# Patient Record
Sex: MALE | Race: OTHER | Employment: OTHER | URBAN - METROPOLITAN AREA
[De-identification: names, ages, dates, MRNs, and addresses within clinical notes are randomized per-mention and may not be internally consistent; named-entity substitution may affect disease eponyms.]

---

## 2017-01-04 ENCOUNTER — TELEPHONE (OUTPATIENT)
Dept: HEMATOLOGY/ONCOLOGY | Facility: HOSPITAL | Age: 67
End: 2017-01-04

## 2017-01-04 NOTE — TELEPHONE ENCOUNTER
Called patient on both home and cell numbers, no answer.  LM reminding him to get labs done prior to tomorrow's follow up with Dr Jose Alejandro Salgado

## 2017-01-05 ENCOUNTER — APPOINTMENT (OUTPATIENT)
Dept: HEMATOLOGY/ONCOLOGY | Facility: HOSPITAL | Age: 67
End: 2017-01-05
Attending: INTERNAL MEDICINE
Payer: MEDICARE

## 2017-01-05 ENCOUNTER — OFFICE VISIT (OUTPATIENT)
Dept: HEMATOLOGY/ONCOLOGY | Facility: HOSPITAL | Age: 67
End: 2017-01-05
Attending: INTERNAL MEDICINE

## 2017-01-05 VITALS
TEMPERATURE: 98 F | WEIGHT: 160 LBS | HEIGHT: 67 IN | DIASTOLIC BLOOD PRESSURE: 80 MMHG | RESPIRATION RATE: 16 BRPM | SYSTOLIC BLOOD PRESSURE: 117 MMHG | HEART RATE: 85 BPM | BODY MASS INDEX: 25.11 KG/M2

## 2017-01-05 DIAGNOSIS — F10.10 ALCOHOL ABUSE: ICD-10-CM

## 2017-01-05 DIAGNOSIS — D69.6 THROMBOCYTOPENIA (HCC): Primary | ICD-10-CM

## 2017-01-05 PROCEDURE — G0463 HOSPITAL OUTPT CLINIC VISIT: HCPCS | Performed by: INTERNAL MEDICINE

## 2017-01-05 PROCEDURE — 99214 OFFICE O/P EST MOD 30 MIN: CPT | Performed by: INTERNAL MEDICINE

## 2017-01-05 NOTE — PROGRESS NOTES
Cancer Center Progress Note    Patient Name: Xochitl Guzman   YOB: 1950   Medical Record Number: [id number]   Attending Physician: Mason Peña M.D. Chief Complaint:   Thrombocytopenia, hx of etoh abuse    History of Present Illness:  6 tobacco: Never Used    Alcohol Use: Yes  0.0 oz/week    0 Standard drinks or equivalent per week         Comment: heavy socially     Drug Use: No    Sexual Activity: Not on file   Not on file  Other Topics Concern    Caffeine Concern Yes    Comment: 1 can 09/24/2016   ALKPHOS 67 09/24/2016   BILT 0.7 09/24/2016   TP 6.7 09/24/2016   ALB 3.9 09/24/2016   GLOBULIN 2.8 09/24/2016   AGRATIO 1.4 09/24/2016    09/24/2016   K 4.2 09/24/2016    09/24/2016   CO2 27 09/24/2016       Radiology:  Ultrasound

## 2017-01-11 NOTE — TELEPHONE ENCOUNTER
AZALIA please call the patient and schedule an appointment to establish care and than route back to us. Thanks.     Diabetes Medications  Protocol Criteria:  · Appointment scheduled in the past 6 months or the next 3 months  · A1C < 7.5 in the past 6 months

## 2017-01-17 NOTE — TELEPHONE ENCOUNTER
Patient stated will choose Dr. Benton Ashley has his new PCP. Medication refilled under Dr. Carmelo Bowers per protocol.

## 2017-03-27 NOTE — TELEPHONE ENCOUNTER
Pharmacy calling to check status on refill, Will have pt call in to establish with new provider, Please advise.

## 2017-03-28 RX ORDER — ATORVASTATIN CALCIUM 20 MG/1
TABLET, FILM COATED ORAL
Qty: 30 TABLET | Refills: 0 | Status: CANCELLED | OUTPATIENT
Start: 2017-03-28

## 2017-03-31 ENCOUNTER — OFFICE VISIT (OUTPATIENT)
Dept: INTERNAL MEDICINE CLINIC | Facility: CLINIC | Age: 67
End: 2017-03-31

## 2017-03-31 VITALS — TEMPERATURE: 98 F | WEIGHT: 163.38 LBS | BODY MASS INDEX: 26 KG/M2

## 2017-03-31 DIAGNOSIS — E78.2 MIXED HYPERLIPIDEMIA: Primary | ICD-10-CM

## 2017-03-31 DIAGNOSIS — E11.9 CONTROLLED TYPE 2 DIABETES MELLITUS WITHOUT COMPLICATION, WITHOUT LONG-TERM CURRENT USE OF INSULIN (HCC): ICD-10-CM

## 2017-03-31 DIAGNOSIS — F41.9 ANXIETY: ICD-10-CM

## 2017-03-31 PROCEDURE — G0463 HOSPITAL OUTPT CLINIC VISIT: HCPCS | Performed by: INTERNAL MEDICINE

## 2017-03-31 PROCEDURE — 99214 OFFICE O/P EST MOD 30 MIN: CPT | Performed by: INTERNAL MEDICINE

## 2017-03-31 RX ORDER — LORAZEPAM 2 MG/1
TABLET ORAL 2 TIMES DAILY
COMMUNITY
End: 2017-03-31

## 2017-03-31 RX ORDER — LORAZEPAM 2 MG/1
2 TABLET ORAL 2 TIMES DAILY PRN
Qty: 60 TABLET | Refills: 0 | Status: SHIPPED | OUTPATIENT
Start: 2017-03-31 | End: 2018-09-02 | Stop reason: DRUGHIGH

## 2017-03-31 RX ORDER — ATORVASTATIN CALCIUM 20 MG/1
20 TABLET, FILM COATED ORAL NIGHTLY
Qty: 90 TABLET | Refills: 1 | Status: SHIPPED | OUTPATIENT
Start: 2017-03-31 | End: 2018-09-02 | Stop reason: ALTCHOICE

## 2017-03-31 NOTE — PROGRESS NOTES
HPI:    Patient ID: Samuel Venegas is a 79year old male. Diabetes  He presents for his follow-up diabetic visit. He has type 2 diabetes mellitus. His disease course has been stable. There are no hypoglycemic associated symptoms.  Pertinent negatives f Surgical History    WRIST FRACTURE SURGERY Left 2003    AMPUTATION FINGER/THUMB Right 1965    Comment traumatic, x 3      History reviewed. No pertinent family history.      Smoking Status: Current Some Day Smoker         Packs/Day: 0.50  Years: 48        T 09/24/2016   A1C 6.0 06/09/2016   A1C 5.8 12/24/2015     Cholesterol:     Lab Results  Component Value Date   CHOLEST 76* 09/24/2016   CHOLEST 120 06/09/2016   CHOLEST 145 12/24/2015     Lab Results  Component Value Date   HDL 20 09/24/2016   HDL 26 06/09/ LORazepam 2 MG Oral Tab  0     Sig: Take by mouth 2 (two) times daily.          Imaging & Referrals:  None       ID#[id number]    By signing my name below, Aleks Dalton,  attest that this documentation has been prepared under the direction and in the presence of

## 2017-04-05 ENCOUNTER — TELEPHONE (OUTPATIENT)
Dept: HEMATOLOGY/ONCOLOGY | Facility: HOSPITAL | Age: 67
End: 2017-04-05

## 2017-04-05 NOTE — TELEPHONE ENCOUNTER
NAVDEEP for patient reminding him to have labs done prior to tomorrow's follow up visit with Dr Lesli Gómez

## 2017-04-06 ENCOUNTER — TELEPHONE (OUTPATIENT)
Dept: HEMATOLOGY/ONCOLOGY | Facility: HOSPITAL | Age: 67
End: 2017-04-06

## 2017-04-06 ENCOUNTER — APPOINTMENT (OUTPATIENT)
Dept: HEMATOLOGY/ONCOLOGY | Facility: HOSPITAL | Age: 67
End: 2017-04-06
Attending: INTERNAL MEDICINE
Payer: MEDICARE

## 2017-04-06 NOTE — TELEPHONE ENCOUNTER
PT NO SHOW SPOKE TO LUZ AND SHE SAID PT NEEDS TO RESCHEDULE. SHE WILL SEND MSG TO CALL CENTER.  [facility]

## 2017-04-27 NOTE — TELEPHONE ENCOUNTER
I have never seen this patient before. However this patient was evaluated and treated by Dr. Katelynn Nguyen. I sent the requested refill but the patient needs to make an appointment with me. Call the patient and relay the information.  Thanks

## 2018-09-02 ENCOUNTER — APPOINTMENT (OUTPATIENT)
Dept: GENERAL RADIOLOGY | Facility: HOSPITAL | Age: 68
DRG: 188 | End: 2018-09-02
Attending: EMERGENCY MEDICINE
Payer: MEDICARE

## 2018-09-02 ENCOUNTER — APPOINTMENT (OUTPATIENT)
Dept: CT IMAGING | Facility: HOSPITAL | Age: 68
DRG: 188 | End: 2018-09-02
Attending: EMERGENCY MEDICINE
Payer: MEDICARE

## 2018-09-02 ENCOUNTER — HOSPITAL ENCOUNTER (INPATIENT)
Facility: HOSPITAL | Age: 68
LOS: 1 days | Discharge: HOME | DRG: 188 | End: 2018-09-03
Attending: EMERGENCY MEDICINE | Admitting: HOSPITALIST
Payer: MEDICARE

## 2018-09-02 ENCOUNTER — APPOINTMENT (OUTPATIENT)
Dept: ULTRASOUND IMAGING | Facility: HOSPITAL | Age: 68
DRG: 188 | End: 2018-09-02
Attending: INTERNAL MEDICINE
Payer: MEDICARE

## 2018-09-02 ENCOUNTER — APPOINTMENT (OUTPATIENT)
Dept: GENERAL RADIOLOGY | Facility: HOSPITAL | Age: 68
DRG: 188 | End: 2018-09-02
Attending: INTERNAL MEDICINE
Payer: MEDICARE

## 2018-09-02 DIAGNOSIS — J90 PLEURAL EFFUSION: Primary | ICD-10-CM

## 2018-09-02 LAB
AMYLASE PLEURAL FLUID: 41 U/L
ANION GAP SERPL CALC-SCNC: 12 MMOL/L (ref 0–18)
BASOPHILS # BLD: 0.1 K/UL (ref 0–0.2)
BASOPHILS NFR BLD: 2 %
BASOPHILS NFR FLD: 0 %
BNP SERPL-MCNC: 237 PG/ML (ref 0–100)
BUN SERPL-MCNC: 8 MG/DL (ref 8–20)
BUN/CREAT SERPL: 8.8 (ref 10–20)
CALCIUM SERPL-MCNC: 8.5 MG/DL (ref 8.5–10.5)
CHLORIDE SERPL-SCNC: 101 MMOL/L (ref 95–110)
CO2 SERPL-SCNC: 21 MMOL/L (ref 22–32)
CREAT SERPL-MCNC: 0.91 MG/DL (ref 0.5–1.5)
EOSINOPHIL # BLD: 0.1 K/UL (ref 0–0.7)
EOSINOPHIL NFR BLD: 2 %
EOSINOPHIL NFR FLD: 1 %
ERYTHROCYTE [DISTWIDTH] IN BLOOD BY AUTOMATED COUNT: 14.9 % (ref 11–15)
GLUCOSE BLDC GLUCOMTR-MCNC: 106 MG/DL (ref 70–99)
GLUCOSE BLDC GLUCOMTR-MCNC: 141 MG/DL (ref 70–99)
GLUCOSE BLDC GLUCOMTR-MCNC: 91 MG/DL (ref 70–99)
GLUCOSE PLR-MCNC: 99 MG/DL
GLUCOSE SERPL-MCNC: 198 MG/DL (ref 70–99)
HCT VFR BLD AUTO: 35.7 % (ref 41–52)
HGB BLD-MCNC: 12 G/DL (ref 13.5–17.5)
LDH PLEURAL FLUID: 202 U/L
LYMPHOCYTES # BLD: 0.8 K/UL (ref 1–4)
LYMPHOCYTES NFR BLD: 17 %
LYMPHOCYTES NFR FLD: 84 %
MCH RBC QN AUTO: 29.7 PG (ref 27–32)
MCHC RBC AUTO-ENTMCNC: 33.6 G/DL (ref 32–37)
MCV RBC AUTO: 88.5 FL (ref 80–100)
MONOCYTES # BLD: 0.5 K/UL (ref 0–1)
MONOCYTES NFR BLD: 10 %
MONOCYTES NFR FLD: 12 %
NEUTROPHILS # BLD AUTO: 3.5 K/UL (ref 1.8–7.7)
NEUTROPHILS NFR BLD: 71 %
NEUTROPHILS NFR FLD: 3 %
OSMOLALITY UR CALC.SUM OF ELEC: 282 MOSM/KG (ref 275–295)
PLATELET # BLD AUTO: 208 K/UL (ref 140–400)
PMV BLD AUTO: 8.9 FL (ref 7.4–10.3)
POTASSIUM SERPL-SCNC: 3.1 MMOL/L (ref 3.3–5.1)
PROT PLR-MCNC: 4.8 G/DL
RBC # BLD AUTO: 4.03 M/UL (ref 4.5–5.9)
RBC # FLD: ABNORMAL /CUMM (ref ?–1)
SODIUM SERPL-SCNC: 134 MMOL/L (ref 136–144)
TRIGL PLR-MCNC: 25 MG/DL
TROPONIN I SERPL-MCNC: 0.01 NG/ML (ref ?–0.03)
WBC # BLD AUTO: 4.9 K/UL (ref 4–11)
WBC # FLD: 269 /CUMM (ref ?–1)

## 2018-09-02 PROCEDURE — 32555 ASPIRATE PLEURA W/ IMAGING: CPT | Performed by: INTERNAL MEDICINE

## 2018-09-02 PROCEDURE — 99223 1ST HOSP IP/OBS HIGH 75: CPT | Performed by: HOSPITALIST

## 2018-09-02 PROCEDURE — 0W993ZZ DRAINAGE OF RIGHT PLEURAL CAVITY, PERCUTANEOUS APPROACH: ICD-10-PCS | Performed by: INTERNAL MEDICINE

## 2018-09-02 PROCEDURE — 71260 CT THORAX DX C+: CPT | Performed by: EMERGENCY MEDICINE

## 2018-09-02 PROCEDURE — 71046 X-RAY EXAM CHEST 2 VIEWS: CPT | Performed by: EMERGENCY MEDICINE

## 2018-09-02 PROCEDURE — 99223 1ST HOSP IP/OBS HIGH 75: CPT | Performed by: INTERNAL MEDICINE

## 2018-09-02 PROCEDURE — 71045 X-RAY EXAM CHEST 1 VIEW: CPT | Performed by: INTERNAL MEDICINE

## 2018-09-02 RX ORDER — POTASSIUM CHLORIDE 20 MEQ/1
40 TABLET, EXTENDED RELEASE ORAL ONCE
Status: COMPLETED | OUTPATIENT
Start: 2018-09-02 | End: 2018-09-02

## 2018-09-02 RX ORDER — LORAZEPAM 2 MG/1
1 TABLET ORAL
COMMUNITY
End: 2018-10-04

## 2018-09-02 RX ORDER — SODIUM PHOSPHATE, DIBASIC AND SODIUM PHOSPHATE, MONOBASIC 7; 19 G/133ML; G/133ML
1 ENEMA RECTAL ONCE AS NEEDED
Status: DISCONTINUED | OUTPATIENT
Start: 2018-09-02 | End: 2018-09-03

## 2018-09-02 RX ORDER — DOCUSATE SODIUM 100 MG/1
100 CAPSULE, LIQUID FILLED ORAL 2 TIMES DAILY
Status: DISCONTINUED | OUTPATIENT
Start: 2018-09-02 | End: 2018-09-03

## 2018-09-02 RX ORDER — BISACODYL 10 MG
10 SUPPOSITORY, RECTAL RECTAL
Status: DISCONTINUED | OUTPATIENT
Start: 2018-09-02 | End: 2018-09-03

## 2018-09-02 RX ORDER — DEXTROSE MONOHYDRATE 25 G/50ML
50 INJECTION, SOLUTION INTRAVENOUS AS NEEDED
Status: DISCONTINUED | OUTPATIENT
Start: 2018-09-02 | End: 2018-09-03

## 2018-09-02 RX ORDER — LORAZEPAM 2 MG/1
2 TABLET ORAL NIGHTLY PRN
COMMUNITY
End: 2018-09-10

## 2018-09-02 RX ORDER — POLYETHYLENE GLYCOL 3350 17 G/17G
17 POWDER, FOR SOLUTION ORAL DAILY PRN
Status: DISCONTINUED | OUTPATIENT
Start: 2018-09-02 | End: 2018-09-03

## 2018-09-02 RX ORDER — ACETAMINOPHEN 325 MG/1
650 TABLET ORAL EVERY 6 HOURS PRN
Status: DISCONTINUED | OUTPATIENT
Start: 2018-09-02 | End: 2018-09-03

## 2018-09-02 RX ORDER — HYDROCODONE BITARTRATE AND ACETAMINOPHEN 5; 325 MG/1; MG/1
1 TABLET ORAL EVERY 6 HOURS PRN
Status: DISCONTINUED | OUTPATIENT
Start: 2018-09-02 | End: 2018-09-03

## 2018-09-02 RX ORDER — SODIUM CHLORIDE 0.9 % (FLUSH) 0.9 %
3 SYRINGE (ML) INJECTION AS NEEDED
Status: DISCONTINUED | OUTPATIENT
Start: 2018-09-02 | End: 2018-09-03

## 2018-09-02 RX ORDER — HEPARIN SODIUM 5000 [USP'U]/ML
5000 INJECTION, SOLUTION INTRAVENOUS; SUBCUTANEOUS EVERY 8 HOURS SCHEDULED
Status: DISCONTINUED | OUTPATIENT
Start: 2018-09-02 | End: 2018-09-03

## 2018-09-02 RX ORDER — LORAZEPAM 1 MG/1
1 TABLET ORAL EVERY 6 HOURS PRN
Status: DISCONTINUED | OUTPATIENT
Start: 2018-09-02 | End: 2018-09-03

## 2018-09-02 NOTE — H&P
[facility] Patient Status:  Inpatient    1950 MRN [id number]   Location [facility] 4W/SW/SE Attending Ozzy Emery MD   Hosp Day # 0 PCP Billy Escamilla MD     Date:  2018 nightly as needed for Anxiety. ASPIRIN OR Take by mouth daily. Pt is unsure of strength of aspirin tabs he is taking. He reports he got the pills in [place], there is no strength printed  on package   METFORMIN  MG Oral Tab TAKE 1 TABLET (500 MG) BY right pleural effusion. Associated right basilar hazy airspace disease, which may reflect compressive atelectasis or superimposed pneumonia. Consider correlation with fluid sampling.      Dictated by (CST): Luh Traylor MD on 9/02/2018 at 11:24     Appro No significant changes have occurred Electronically signed on 09/02/2018 at 11:14 by Jose Juan Ashley MD      Assessment/Plan:     Large right-sided pleural-based mass  Suspicious for primary pleural-based neoplasm  Suspect stage IV lung cancer  Large partially

## 2018-09-02 NOTE — ED INITIAL ASSESSMENT (HPI)
Pt is c/o right sided chest pain for the past 2 weeks with cough. Pt recently arrived from [facility] yesterday.

## 2018-09-02 NOTE — CONSULTS
[facility]OSP - [place]    Report of Consultation    Beto Dominguez Patient Status:  Emergency    1950 MRN [id number]   Location [address] Attending Eduardo Johnson MD   Hosp Day # 0 PCP Edyta Hebert MD day  Alcohol use: Yes              Comment: socially, on occasion         Current Medications:    No current facility-administered medications for this encounter.      (Not in a hospital admission)    Allergies  No Known Allergies    Review of Systems:    P This is suspicious for primary pleural-based neoplasm. The mass demonstrates extension into the right anterior chest wall through multiple intercostal spaces.  There is also an adjacent lytic lesion involving the right anterior fifth costochondral junction,

## 2018-09-02 NOTE — ED NOTES
Care assumed from triage [facility] to room stand pivot to stretcher Primarily Cambodian speaking presents in care of bilingual family with several week hx of R lateral chest discomfort that exacerbates with deep breathing reports + 10 pd unintentional weight loss R

## 2018-09-02 NOTE — H&P (VIEW-ONLY)
[facility] HOSP - [facility]    Report of Consultation    Xocihtl Guzman Patient Status:  Emergency    1950 MRN [id number]   Location [address] Attending Bobo Najera MD   Hosp Day # 0 PCP Arcadio Joaquin MD day  Alcohol use: Yes              Comment: socially, on occasion         Current Medications:    No current facility-administered medications for this encounter.      (Not in a hospital admission)    Allergies  No Known Allergies    Review of Systems:    P This is suspicious for primary pleural-based neoplasm. The mass demonstrates extension into the right anterior chest wall through multiple intercostal spaces.  There is also an adjacent lytic lesion involving the right anterior fifth costochondral junction,

## 2018-09-02 NOTE — ED PROVIDER NOTES
Patient Seen in: [facility] AND [facility] Emergency Department    History   Patient presents with:  Dyspnea ROBYN SOB (respiratory)  Chest Pain Angina (cardiovascular)    Stated Complaint: SOB/Chest Pain     HPI    27-year-old male who does smoke but lives here b normal. Pupils are equal, round, and reactive to light. Neck: Normal range of motion. Neck supple. No JVD or lymphadenopathy. Cardiovascular: Normal rate, regular rhythm and intact distal pulses.     Pulmonary/Chest: Effort normal. No respiratory distre RAINBOW DRAW BLUE   RAINBOW DRAW LAVENDER   RAINBOW DRAW DARK GREEN   RAINBOW DRAW LIGHT GREEN   RAINBOW DRAW GOLD   RAINBOW DRAW LAVENDER TALL (BNP)     EKG    Rate, intervals and axes as noted on EKG Report.   Rate: 102  Rhythm: Sinus Rhythm  Reading: N hemodynamically stable. Disposition and Plan     Clinical Impression:  Pleural effusion  (primary encounter diagnosis)    Disposition:  Admit  9/2/2018  1:05 pm    Follow-up:  No follow-up provider specified.   We recommend that you schedule follow

## 2018-09-03 VITALS
WEIGHT: 160 LBS | OXYGEN SATURATION: 96 % | BODY MASS INDEX: 25.71 KG/M2 | DIASTOLIC BLOOD PRESSURE: 71 MMHG | TEMPERATURE: 98 F | HEIGHT: 66 IN | SYSTOLIC BLOOD PRESSURE: 103 MMHG | RESPIRATION RATE: 20 BRPM | HEART RATE: 92 BPM

## 2018-09-03 LAB
ANION GAP SERPL CALC-SCNC: 7 MMOL/L (ref 0–18)
BASOPHILS # BLD: 0.1 K/UL (ref 0–0.2)
BASOPHILS NFR BLD: 1 %
BUN SERPL-MCNC: 8 MG/DL (ref 8–20)
BUN/CREAT SERPL: 10.4 (ref 10–20)
CALCIUM SERPL-MCNC: 8.5 MG/DL (ref 8.5–10.5)
CHLORIDE SERPL-SCNC: 104 MMOL/L (ref 95–110)
CO2 SERPL-SCNC: 27 MMOL/L (ref 22–32)
CREAT SERPL-MCNC: 0.77 MG/DL (ref 0.5–1.5)
EOSINOPHIL # BLD: 0.1 K/UL (ref 0–0.7)
EOSINOPHIL NFR BLD: 2 %
ERYTHROCYTE [DISTWIDTH] IN BLOOD BY AUTOMATED COUNT: 14.8 % (ref 11–15)
GLUCOSE BLDC GLUCOMTR-MCNC: 108 MG/DL (ref 70–99)
GLUCOSE BLDC GLUCOMTR-MCNC: 114 MG/DL (ref 70–99)
GLUCOSE SERPL-MCNC: 123 MG/DL (ref 70–99)
HBA1C MFR BLD: 6.3 % (ref 4–6)
HCT VFR BLD AUTO: 33.9 % (ref 41–52)
HGB BLD-MCNC: 11.4 G/DL (ref 13.5–17.5)
LYMPHOCYTES # BLD: 1 K/UL (ref 1–4)
LYMPHOCYTES NFR BLD: 20 %
MAGNESIUM SERPL-MCNC: 1.6 MG/DL (ref 1.8–2.5)
MCH RBC QN AUTO: 29.8 PG (ref 27–32)
MCHC RBC AUTO-ENTMCNC: 33.7 G/DL (ref 32–37)
MCV RBC AUTO: 88.3 FL (ref 80–100)
MONOCYTES # BLD: 0.5 K/UL (ref 0–1)
MONOCYTES NFR BLD: 11 %
NEUTROPHILS # BLD AUTO: 3.2 K/UL (ref 1.8–7.7)
NEUTROPHILS NFR BLD: 65 %
OSMOLALITY UR CALC.SUM OF ELEC: 286 MOSM/KG (ref 275–295)
PLATELET # BLD AUTO: 191 K/UL (ref 140–400)
PMV BLD AUTO: 9 FL (ref 7.4–10.3)
POTASSIUM SERPL-SCNC: 3.3 MMOL/L (ref 3.3–5.1)
RBC # BLD AUTO: 3.84 M/UL (ref 4.5–5.9)
SODIUM SERPL-SCNC: 138 MMOL/L (ref 136–144)
WBC # BLD AUTO: 4.8 K/UL (ref 4–11)

## 2018-09-03 PROCEDURE — 99232 SBSQ HOSP IP/OBS MODERATE 35: CPT | Performed by: INTERNAL MEDICINE

## 2018-09-03 PROCEDURE — 99239 HOSP IP/OBS DSCHRG MGMT >30: CPT | Performed by: HOSPITALIST

## 2018-09-03 RX ORDER — NICOTINE 21 MG/24HR
1 PATCH, TRANSDERMAL 24 HOURS TRANSDERMAL DAILY
Qty: 30 PATCH | Refills: 0 | Status: SHIPPED | OUTPATIENT
Start: 2018-09-03 | End: 2019-02-26 | Stop reason: ALTCHOICE

## 2018-09-03 RX ORDER — POTASSIUM CHLORIDE 20 MEQ/1
40 TABLET, EXTENDED RELEASE ORAL EVERY 4 HOURS
Status: COMPLETED | OUTPATIENT
Start: 2018-09-03 | End: 2018-09-03

## 2018-09-03 RX ORDER — MAGNESIUM OXIDE 400 MG (241.3 MG MAGNESIUM) TABLET
400 TABLET ONCE
Status: COMPLETED | OUTPATIENT
Start: 2018-09-03 | End: 2018-09-03

## 2018-09-03 RX ORDER — NICOTINE 21 MG/24HR
1 PATCH, TRANSDERMAL 24 HOURS TRANSDERMAL DAILY
Status: DISCONTINUED | OUTPATIENT
Start: 2018-09-03 | End: 2018-09-03

## 2018-09-03 RX ORDER — MAGNESIUM SULFATE HEPTAHYDRATE 40 MG/ML
2 INJECTION, SOLUTION INTRAVENOUS ONCE
Status: DISCONTINUED | OUTPATIENT
Start: 2018-09-03 | End: 2018-09-03

## 2018-09-03 RX ORDER — POTASSIUM CHLORIDE 29.8 MG/ML
40 INJECTION INTRAVENOUS ONCE
Status: DISCONTINUED | OUTPATIENT
Start: 2018-09-03 | End: 2018-09-03

## 2018-09-03 NOTE — PLAN OF CARE
Diabetes/Glucose Control    • Glucose maintained within prescribed range Progressing        PAIN - ADULT    • Verbalizes/displays adequate comfort level or patient's stated pain goal Progressing        Patient Centered Care    • Patient preferences are jolie

## 2018-09-03 NOTE — PROGRESS NOTES
Patient received from ED, alert and oriented X4. Bruneian speaking but daughter at bedside to assisit in translation (refused language line). Patient denies pain. US guided thoracentesis done by Dr Marcos Salguero (900 ml removed) and sent for labs.  No distress note

## 2018-09-03 NOTE — DISCHARGE SUMMARY
[facility]    Discharge Summary    Rosaura Barton Patient Status:  Inpatient    1950 MRN [id number]   Location [facility] 4W/SW/SE Attending Tracey Ma MD   Hosp Day # 1 PCP Saul Ascencio MD     Date of Admissi A1c  -Sliding scale insulin     Small hiatal hernia  -No acute issues     DVT prophylaxis: Heparin      CULTURE:     Hospital Encounter on 09/02/18  1.  BODY FLUID CULT,AEROBIC AND ANAEROBIC     Status: None (Preliminary result)   Collection Time: 09/02/18 reflect low-grade pulmonary edema. 6. Coronary atherosclerosis. 7. Sequelae of remote granulomatous disease. 8. Small hiatal hernia. Findings that can be seen with gastroesophageal reflux disease.      Dictated by (CST): Michelle Gray MD on 9/02/2018 at 1 [address]    In 1 week      Elex Pallas, Donnie Blend, MD  [facility] [address]  [phone]    In 1 week      Chapin Jenkins [facility] [address] [id number]  [phone]    In 1 week        C

## 2018-09-03 NOTE — PLAN OF CARE
Diabetes/Glucose Control    • Glucose maintained within prescribed range Adequate for Discharge        PAIN - ADULT    • Verbalizes/displays adequate comfort level or patient's stated pain goal Adequate for Discharge        Patient Centered Care    • Edilmae

## 2018-09-03 NOTE — PROGRESS NOTES
[facility]    Progress Note    Fabricio Bowser Patient Status:  Inpatient    1950 MRN [id number]   Location [facility] 4W/SW/SE Attending Rox Kenyon MD   Hosp Day # 1 PCP Anam Juan MD        Subjective: airspace disease, which may reflect compressive atelectasis or superimposed pneumonia. Consider correlation with fluid sampling.      Dictated by (CST): Darren Segovia MD on 9/02/2018 at 11:24     Approved by (CST): Darren Segovia MD on 9/02/2018 at 11:25 pleural-parenchymal metastases are better assessed on the same date prior chest CT.      Dictated by (CST): Javi Newton MD on 9/02/2018 at 17:03     Approved by (CST): Javi Newotn MD on 9/02/2018 at 17:04          Us Thoracentesis Guided Right (cpt=

## 2018-09-05 LAB — ADENOSINE DEAMINASE, PLEURAL FLUID: 6.1 U/L

## 2018-09-07 ENCOUNTER — TELEPHONE (OUTPATIENT)
Dept: PULMONOLOGY | Facility: CLINIC | Age: 68
End: 2018-09-07

## 2018-09-07 DIAGNOSIS — R91.8 LUNG MASS: Primary | ICD-10-CM

## 2018-09-07 NOTE — TELEPHONE ENCOUNTER
Telephone order with read back from Dr. Laurent Prieto for CT guided biopsy by IR for right lung mass. Xenia Dubose in IR informed of biospy and she will start process for biopsy.

## 2018-09-10 ENCOUNTER — OFFICE VISIT (OUTPATIENT)
Dept: HEMATOLOGY/ONCOLOGY | Facility: HOSPITAL | Age: 68
End: 2018-09-10
Attending: INTERNAL MEDICINE
Payer: MEDICARE

## 2018-09-10 VITALS
SYSTOLIC BLOOD PRESSURE: 117 MMHG | BODY MASS INDEX: 23.78 KG/M2 | WEIGHT: 148 LBS | HEIGHT: 66 IN | HEART RATE: 85 BPM | TEMPERATURE: 98 F | RESPIRATION RATE: 16 BRPM | DIASTOLIC BLOOD PRESSURE: 66 MMHG

## 2018-09-10 DIAGNOSIS — Z87.891 FORMER SMOKER: ICD-10-CM

## 2018-09-10 DIAGNOSIS — R91.8 MASS OF RIGHT LUNG: ICD-10-CM

## 2018-09-10 DIAGNOSIS — D69.6 THROMBOCYTOPENIA (HCC): Primary | ICD-10-CM

## 2018-09-10 PROCEDURE — 99215 OFFICE O/P EST HI 40 MIN: CPT | Performed by: INTERNAL MEDICINE

## 2018-09-10 RX ORDER — IBUPROFEN 200 MG
200 TABLET ORAL EVERY 6 HOURS PRN
COMMUNITY
End: 2019-02-26

## 2018-09-10 NOTE — PROGRESS NOTES
Cancer Center Progress Note    Patient Name: Miya Klein   YOB: 1950   Medical Record Number: [id number]   Attending Physician: Oj Fernandez M.D. Chief Complaint:   Thrombocytopenia, hx of etoh abuse, right lung mass, pleural effusio worry: Not on file      Food insecurity - inability: Not on file      Transportation needs - medical: Not on file      Transportation needs - non-medical: Not on file    Occupational History      Occupation: [employer]: retired    Tobacco Use Sitting, Cuff Size: adult)   Pulse 85   Temp 98.4 °F (36.9 °C) (Oral)   Resp 16   Ht 1.676 m (5' 6\")   Wt 67.1 kg (148 lb)   BMI 23.89 kg/m²     Physical Examination:  General: Patient is alert and oriented x 3, not in acute distress.   Psych:  Mood and af have a CT-guided biopsy this week.   Further recommendations to follow      Assessment: High new lung mass concern for malignancy    Allie Oakes MD

## 2018-09-11 ENCOUNTER — HOSPITAL ENCOUNTER (OUTPATIENT)
Dept: INTERVENTIONAL RADIOLOGY/VASCULAR | Facility: HOSPITAL | Age: 68
Discharge: HOME | End: 2018-09-11
Attending: INTERNAL MEDICINE | Admitting: INTERNAL MEDICINE
Payer: MEDICARE

## 2018-09-11 VITALS
WEIGHT: 153 LBS | BODY MASS INDEX: 25 KG/M2 | OXYGEN SATURATION: 96 % | SYSTOLIC BLOOD PRESSURE: 115 MMHG | DIASTOLIC BLOOD PRESSURE: 81 MMHG | HEART RATE: 94 BPM | RESPIRATION RATE: 21 BRPM

## 2018-09-11 DIAGNOSIS — R91.8 LUNG MASS: ICD-10-CM

## 2018-09-11 LAB
GLUCOSE BLDC GLUCOMTR-MCNC: 117 MG/DL (ref 70–99)
INR BLD: 1.2 (ref 0.9–1.2)
PROTHROMBIN TIME: 14.6 SECONDS (ref 11.8–14.5)

## 2018-09-11 PROCEDURE — 99152 MOD SED SAME PHYS/QHP 5/>YRS: CPT

## 2018-09-11 PROCEDURE — 76942 ECHO GUIDE FOR BIOPSY: CPT

## 2018-09-11 PROCEDURE — 88341 IMHCHEM/IMCYTCHM EA ADD ANTB: CPT | Performed by: INTERNAL MEDICINE

## 2018-09-11 PROCEDURE — 82962 GLUCOSE BLOOD TEST: CPT

## 2018-09-11 PROCEDURE — 32405: CPT

## 2018-09-11 PROCEDURE — 0BBC3ZX EXCISION OF RIGHT UPPER LUNG LOBE, PERCUTANEOUS APPROACH, DIAGNOSTIC: ICD-10-PCS | Performed by: RADIOLOGY

## 2018-09-11 PROCEDURE — 88305 TISSUE EXAM BY PATHOLOGIST: CPT | Performed by: INTERNAL MEDICINE

## 2018-09-11 PROCEDURE — 85610 PROTHROMBIN TIME: CPT | Performed by: RADIOLOGY

## 2018-09-11 PROCEDURE — 88342 IMHCHEM/IMCYTCHM 1ST ANTB: CPT | Performed by: INTERNAL MEDICINE

## 2018-09-11 PROCEDURE — 88334 PATH CONSLTJ SURG CYTO XM EA: CPT | Performed by: INTERNAL MEDICINE

## 2018-09-11 PROCEDURE — 88333 PATH CONSLTJ SURG CYTO XM 1: CPT | Performed by: INTERNAL MEDICINE

## 2018-09-11 RX ORDER — SODIUM CHLORIDE 9 MG/ML
INJECTION, SOLUTION INTRAVENOUS
Status: DISCONTINUED
Start: 2018-09-11 | End: 2018-09-11

## 2018-09-11 RX ORDER — LIDOCAINE HYDROCHLORIDE 20 MG/ML
INJECTION, SOLUTION EPIDURAL; INFILTRATION; INTRACAUDAL; PERINEURAL
Status: COMPLETED
Start: 2018-09-11 | End: 2018-09-11

## 2018-09-11 RX ORDER — SODIUM CHLORIDE 9 MG/ML
INJECTION, SOLUTION INTRAVENOUS
Status: COMPLETED
Start: 2018-09-11 | End: 2018-09-11

## 2018-09-11 RX ORDER — MIDAZOLAM HYDROCHLORIDE 1 MG/ML
INJECTION INTRAMUSCULAR; INTRAVENOUS
Status: COMPLETED
Start: 2018-09-11 | End: 2018-09-11

## 2018-09-11 RX ORDER — SODIUM CHLORIDE 9 MG/ML
INJECTION, SOLUTION INTRAVENOUS CONTINUOUS
Status: DISCONTINUED | OUTPATIENT
Start: 2018-09-11 | End: 2018-09-11

## 2018-09-11 NOTE — PRE-SEDATION ASSESSMENT
[place] [facility]  IR Pre-Procedure Sedation Assessment    History of snoring or sleep or apnea?    No    History of previous problems with anesthesia or sedation  No    Physical Findings:  Neck: nl ROM  CV: RRR  PULM: normal respiratory rate/effor

## 2018-09-11 NOTE — INTERVAL H&P NOTE
The above referenced H&P was reviewed by Williams Jim MD on 9/11/2018, the patient was examined and no significant changes have occurred in the patient's condition since the H&P was performed.   Risks, benefits, alternative treatments and consequences

## 2018-09-11 NOTE — INTERVAL H&P NOTE
The above referenced H&P was reviewed by Angelica Lawler MD on 9/11/2018, the patient was examined and no significant changes have occurred in the patient's condition since the H&P was performed.   Risks, benefits, alternative treatments and consequences

## 2018-09-11 NOTE — PROCEDURES
[facility]  Procedure Note    Jennett Labor Patient Status:  Outpatient in a Bed    1950 MRN [id number]   Location [facility] Attending Kaitlin Vázquez MD   Hosp Day # 0 PCP Clyde Gillis MD     P

## 2018-09-11 NOTE — INTERVAL H&P NOTE
The above referenced H&P was reviewed by Mignon Stevens MD on 9/11/2018, the patient was examined and no significant changes have occurred in the patient's condition since the H&P was performed.   Risks, benefits, alternative treatments and consequences

## 2018-09-11 NOTE — PROGRESS NOTES
Discharge instructions given to patient. No complaints of pain and discomfort. Dressing on the right upper chest intact. No bleeding or hematoma noted. VSS. Patient left in good condition.

## 2018-09-12 ENCOUNTER — TELEPHONE (OUTPATIENT)
Dept: PULMONOLOGY | Facility: CLINIC | Age: 68
End: 2018-09-12

## 2018-09-12 DIAGNOSIS — C80.1 CANCER (HCC): Primary | ICD-10-CM

## 2018-09-12 NOTE — TELEPHONE ENCOUNTER
I called the patient, and I spoke to her son Carole Jerome since the patient does not speak English  I informed the family and the patient that the biopsy result is positive for cancer  Its metastatic clear cell renal carcinoma  I informed him about the need o

## 2018-09-12 NOTE — TELEPHONE ENCOUNTER
Referral entered in the system for oncology. Permission given by ptient to speak to his son Jaelyn Waite and also his wife Bakari Lara about sensitive health information.  Spoke to Jaelyn Waite appt scheduled for Sept 24th at 12pm. Pt son aware of date time and location

## 2018-09-12 NOTE — TELEPHONE ENCOUNTER
Pt had Biopsy yesterday. Pt daughter calling to schedule an appt to discuss. José Miguel Jose when do you want to add pt to your schedule?

## 2018-09-18 ENCOUNTER — OFFICE VISIT (OUTPATIENT)
Dept: HEMATOLOGY/ONCOLOGY | Facility: HOSPITAL | Age: 68
End: 2018-09-18
Attending: INTERNAL MEDICINE
Payer: MEDICARE

## 2018-09-18 VITALS
WEIGHT: 151 LBS | HEART RATE: 83 BPM | RESPIRATION RATE: 16 BRPM | SYSTOLIC BLOOD PRESSURE: 116 MMHG | BODY MASS INDEX: 24.27 KG/M2 | TEMPERATURE: 98 F | HEIGHT: 66 IN | DIASTOLIC BLOOD PRESSURE: 60 MMHG

## 2018-09-18 DIAGNOSIS — C64.9 METASTATIC RENAL CELL CARCINOMA, UNSPECIFIED LATERALITY (HCC): Primary | ICD-10-CM

## 2018-09-18 DIAGNOSIS — Z51.11 CHEMOTHERAPY MANAGEMENT, ENCOUNTER FOR: ICD-10-CM

## 2018-09-18 DIAGNOSIS — C78.00 MALIGNANT NEOPLASM METASTATIC TO LUNG, UNSPECIFIED LATERALITY (HCC): ICD-10-CM

## 2018-09-18 PROCEDURE — 99215 OFFICE O/P EST HI 40 MIN: CPT | Performed by: INTERNAL MEDICINE

## 2018-09-18 NOTE — PROGRESS NOTES
Cancer Center Progress Note    Patient Name: Kentrell Obando   YOB: 1950   Medical Record Number: [id number]   Attending Physician: Chris Worrell M.D.        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 Psych:  Mood and affect appropriate  HEENT: EOMs intact. PERRL. Oropharynx is clear. Neck: No JVD. No palpable lymphadenopathy. Neck is supple. Lymphatics:  There is no palpable peripheral lymphadenopathy   Chest: Cl

## 2018-09-19 ENCOUNTER — APPOINTMENT (OUTPATIENT)
Dept: HEMATOLOGY/ONCOLOGY | Facility: HOSPITAL | Age: 68
End: 2018-09-19
Attending: INTERNAL MEDICINE
Payer: MEDICARE

## 2018-09-21 ENCOUNTER — OFFICE VISIT (OUTPATIENT)
Dept: HEMATOLOGY/ONCOLOGY | Facility: HOSPITAL | Age: 68
End: 2018-09-21
Attending: PHYSICIAN ASSISTANT
Payer: MEDICARE

## 2018-09-21 DIAGNOSIS — C64.9 METASTATIC RENAL CELL CARCINOMA, UNSPECIFIED LATERALITY (HCC): ICD-10-CM

## 2018-09-21 LAB
ALBUMIN SERPL BCP-MCNC: 3.5 G/DL (ref 3.5–4.8)
ALBUMIN/GLOB SERPL: 0.9 {RATIO} (ref 1–2)
ALP SERPL-CCNC: 71 U/L (ref 32–100)
ALT SERPL-CCNC: 11 U/L (ref 17–63)
ANION GAP SERPL CALC-SCNC: 8 MMOL/L (ref 0–18)
AST SERPL-CCNC: 15 U/L (ref 15–41)
BASOPHILS # BLD: 0.1 K/UL (ref 0–0.2)
BASOPHILS NFR BLD: 1 %
BILIRUB SERPL-MCNC: 0.7 MG/DL (ref 0.3–1.2)
BUN SERPL-MCNC: 9 MG/DL (ref 8–20)
BUN/CREAT SERPL: 12.2 (ref 10–20)
CALCIUM SERPL-MCNC: 8.8 MG/DL (ref 8.5–10.5)
CHLORIDE SERPL-SCNC: 101 MMOL/L (ref 95–110)
CO2 SERPL-SCNC: 24 MMOL/L (ref 22–32)
CREAT SERPL-MCNC: 0.74 MG/DL (ref 0.5–1.5)
EOSINOPHIL # BLD: 0.1 K/UL (ref 0–0.7)
EOSINOPHIL NFR BLD: 2 %
ERYTHROCYTE [DISTWIDTH] IN BLOOD BY AUTOMATED COUNT: 14.5 % (ref 11–15)
GLOBULIN PLAS-MCNC: 4.1 G/DL (ref 2.5–3.7)
GLUCOSE SERPL-MCNC: 189 MG/DL (ref 70–99)
HCT VFR BLD AUTO: 34.2 % (ref 41–52)
HGB BLD-MCNC: 11.6 G/DL (ref 13.5–17.5)
LYMPHOCYTES # BLD: 0.8 K/UL (ref 1–4)
LYMPHOCYTES NFR BLD: 16 %
MCH RBC QN AUTO: 29.6 PG (ref 27–32)
MCHC RBC AUTO-ENTMCNC: 33.8 G/DL (ref 32–37)
MCV RBC AUTO: 87.6 FL (ref 80–100)
MONOCYTES # BLD: 0.4 K/UL (ref 0–1)
MONOCYTES NFR BLD: 8 %
NEUTROPHILS # BLD AUTO: 3.5 K/UL (ref 1.8–7.7)
NEUTROPHILS NFR BLD: 72 %
OSMOLALITY UR CALC.SUM OF ELEC: 280 MOSM/KG (ref 275–295)
PATIENT FASTING: NO
PLATELET # BLD AUTO: 202 K/UL (ref 140–400)
PMV BLD AUTO: 9.1 FL (ref 7.4–10.3)
POTASSIUM SERPL-SCNC: 4 MMOL/L (ref 3.3–5.1)
PROT SERPL-MCNC: 7.6 G/DL (ref 5.9–8.4)
RBC # BLD AUTO: 3.91 M/UL (ref 4.5–5.9)
SODIUM SERPL-SCNC: 133 MMOL/L (ref 136–144)
TSH SERPL-ACNC: 0.93 UIU/ML (ref 0.45–5.33)
WBC # BLD AUTO: 4.9 K/UL (ref 4–11)

## 2018-09-21 PROCEDURE — 99215 OFFICE O/P EST HI 40 MIN: CPT | Performed by: PHYSICIAN ASSISTANT

## 2018-09-21 RX ORDER — PROCHLORPERAZINE MALEATE 10 MG
10 TABLET ORAL EVERY 6 HOURS PRN
Qty: 60 TABLET | Refills: 3 | Status: SHIPPED | OUTPATIENT
Start: 2018-09-21

## 2018-09-21 NOTE — PROGRESS NOTES
Medication Education Record: IV Therapy    Learner:  Patient, Spouse and Family Member    Barriers / Limitations:  Language    Diagnosis:   Kidney cancer    IV Cancer Treatment Name(s): Ipilimumab (Yervoy) and Nivolumab (Opdivo)  IV Cancer Treatment Elias Golden this, steps will be taken to prevent and minimize this from occurring.     Recommended Anti-nausea medications (as directed by your provider):  Prochloperazine (Compazine) 10 mg every 6 hours    Helpful hints during cancer treatment:    Diet:  o Avoid greas triage nurse for further instructions. Skin Care  o Avoid direct sunlight.  o Wear a broad-spectrum sunscreen with an SPF of 30 or higher on any skin exposed to the sun. Re-apply every 2 hours if in the sun and after bathing or sweating.   o For dry skin, and Handling of Chemotherapy  While you or your family member is receiving chemotherapy, whether in the clinic or at home, the following precautions must be taken to lessen any exposure to the medication. Handling Body Waste:   1.  Caregivers must wear Chemotherapy can have harmful side effects to the fetus, especially in the first trimester.   In addition, menstrual cycles can become irregular during and after treatment, so you may not know if you are at a time in your cycle when you could become pregnan

## 2018-09-22 ENCOUNTER — HOSPITAL ENCOUNTER (OUTPATIENT)
Dept: CT IMAGING | Age: 68
Discharge: HOME | End: 2018-09-22
Attending: INTERNAL MEDICINE
Payer: MEDICARE

## 2018-09-22 DIAGNOSIS — C64.9 METASTATIC RENAL CELL CARCINOMA, UNSPECIFIED LATERALITY (HCC): ICD-10-CM

## 2018-09-22 PROCEDURE — 74176 CT ABD & PELVIS W/O CONTRAST: CPT | Performed by: INTERNAL MEDICINE

## 2018-09-24 ENCOUNTER — OFFICE VISIT (OUTPATIENT)
Dept: PULMONOLOGY | Facility: CLINIC | Age: 68
End: 2018-09-24
Payer: MEDICARE

## 2018-09-24 VITALS
SYSTOLIC BLOOD PRESSURE: 99 MMHG | BODY MASS INDEX: 23.61 KG/M2 | OXYGEN SATURATION: 98 % | RESPIRATION RATE: 18 BRPM | DIASTOLIC BLOOD PRESSURE: 63 MMHG | HEART RATE: 80 BPM | HEIGHT: 67.5 IN | WEIGHT: 152.19 LBS

## 2018-09-24 DIAGNOSIS — J90 PLEURAL EFFUSION: Primary | ICD-10-CM

## 2018-09-24 DIAGNOSIS — C64.1 RENAL CELL CARCINOMA OF RIGHT KIDNEY (HCC): ICD-10-CM

## 2018-09-24 DIAGNOSIS — J44.9 CHRONIC OBSTRUCTIVE PULMONARY DISEASE, UNSPECIFIED COPD TYPE (HCC): ICD-10-CM

## 2018-09-24 DIAGNOSIS — F17.200 SMOKER: ICD-10-CM

## 2018-09-24 PROCEDURE — 99214 OFFICE O/P EST MOD 30 MIN: CPT | Performed by: INTERNAL MEDICINE

## 2018-09-24 PROCEDURE — 1111F DSCHRG MED/CURRENT MED MERGE: CPT | Performed by: INTERNAL MEDICINE

## 2018-09-24 PROCEDURE — G0463 HOSPITAL OUTPT CLINIC VISIT: HCPCS | Performed by: INTERNAL MEDICINE

## 2018-09-24 RX ORDER — NICOTINE 21 MG/24HR
PATCH, TRANSDERMAL 24 HOURS TRANSDERMAL
Qty: 14 PATCH | Refills: 0 | Status: SHIPPED | OUTPATIENT
Start: 2018-09-24 | End: 2019-02-26

## 2018-09-24 NOTE — PROGRESS NOTES
HPI:    Patient ID: Gm Torres is a 76year old male.     HPI  Came with his daughter was translating for him  Doing well overall and gained few pounds  No significant cough  Mild dyspnea on exertion otherwise no significant dyspnea or pain the right base otherwise clear   Abdominal: Bowel sounds are normal.   Musculoskeletal: He exhibits no edema. Neurological: He is alert and oriented to person, place, and time.               ASSESSMENT/PLAN:   Pleural effusion  (primary encounter diagnosi

## 2018-09-26 ENCOUNTER — OFFICE VISIT (OUTPATIENT)
Dept: HEMATOLOGY/ONCOLOGY | Facility: HOSPITAL | Age: 68
End: 2018-09-26
Attending: INTERNAL MEDICINE
Payer: MEDICARE

## 2018-09-26 VITALS
WEIGHT: 153 LBS | HEART RATE: 76 BPM | TEMPERATURE: 98 F | SYSTOLIC BLOOD PRESSURE: 115 MMHG | DIASTOLIC BLOOD PRESSURE: 56 MMHG | BODY MASS INDEX: 24 KG/M2 | RESPIRATION RATE: 16 BRPM

## 2018-09-26 DIAGNOSIS — C64.9 METASTATIC RENAL CELL CARCINOMA, UNSPECIFIED LATERALITY (HCC): Primary | ICD-10-CM

## 2018-09-26 PROCEDURE — 96413 CHEMO IV INFUSION 1 HR: CPT

## 2018-09-26 PROCEDURE — A4216 STERILE WATER/SALINE, 10 ML: HCPCS

## 2018-09-26 PROCEDURE — 96417 CHEMO IV INFUS EACH ADDL SEQ: CPT

## 2018-09-26 RX ORDER — 0.9 % SODIUM CHLORIDE 0.9 %
VIAL (ML) INJECTION
Status: DISCONTINUED
Start: 2018-09-26 | End: 2018-09-26

## 2018-09-26 RX ORDER — SODIUM CHLORIDE 9 MG/ML
INJECTION, SOLUTION INTRAVENOUS
Status: DISCONTINUED
Start: 2018-09-26 | End: 2018-09-26

## 2018-09-26 NOTE — PATIENT INSTRUCTIONS
Medication Education Record: IV Therapy    Learner:  Patient, Spouse and Family Member    Barriers / Limitations:  Language    Diagnosis:   Kidney cancer    IV Cancer Treatment Name(s): Ipilimumab (Yervoy) and Nivolumab (Opdivo)  IV Cancer Treatment Bibiana Cortez this, steps will be taken to prevent and minimize this from occurring.     Recommended Anti-nausea medications (as directed by your provider):  Prochloperazine (Compazine) 10 mg every 6 hours    Helpful hints during cancer treatment:    Diet:  o Avoid greas triage nurse for further instructions. Skin Care  o Avoid direct sunlight.  o Wear a broad-spectrum sunscreen with an SPF of 30 or higher on any skin exposed to the sun. Re-apply every 2 hours if in the sun and after bathing or sweating.   o For dry skin, and Handling of Chemotherapy  While you or your family member is receiving chemotherapy, whether in the clinic or at home, the following precautions must be taken to lessen any exposure to the medication. Handling Body Waste:   1.  Caregivers must wear Chemotherapy can have harmful side effects to the fetus, especially in the first trimester.   In addition, menstrual cycles can become irregular during and after treatment, so you may not know if you are at a time in your cycle when you could become pregnan

## 2018-09-26 NOTE — PROGRESS NOTES
Pt here for C1 D1 Opdivo/Yervoy. Arrives Ambulating independently, accompanied by Family member, wife and daughter.  Daughter translated for patient, refused language line     Modifications in dose or schedule: no C1D1     Frequency of blood return and sit

## 2018-09-27 ENCOUNTER — TELEPHONE (OUTPATIENT)
Dept: HEMATOLOGY/ONCOLOGY | Facility: HOSPITAL | Age: 68
End: 2018-09-27

## 2018-09-27 NOTE — TELEPHONE ENCOUNTER
Called patient 24 hour after first treatment, states feeling fine, no problems or reactions. To call for any problems or questions.

## 2018-10-04 ENCOUNTER — OFFICE VISIT (OUTPATIENT)
Dept: INTERNAL MEDICINE CLINIC | Facility: CLINIC | Age: 68
End: 2018-10-04
Payer: MEDICARE

## 2018-10-04 VITALS
WEIGHT: 154 LBS | HEART RATE: 93 BPM | BODY MASS INDEX: 23.34 KG/M2 | SYSTOLIC BLOOD PRESSURE: 111 MMHG | TEMPERATURE: 98 F | HEIGHT: 68 IN | DIASTOLIC BLOOD PRESSURE: 63 MMHG

## 2018-10-04 DIAGNOSIS — F41.9 ANXIETY: ICD-10-CM

## 2018-10-04 DIAGNOSIS — E11.9 CONTROLLED TYPE 2 DIABETES MELLITUS WITHOUT COMPLICATION, WITHOUT LONG-TERM CURRENT USE OF INSULIN (HCC): Primary | ICD-10-CM

## 2018-10-04 DIAGNOSIS — C64.9 METASTATIC RENAL CELL CARCINOMA, UNSPECIFIED LATERALITY (HCC): ICD-10-CM

## 2018-10-04 PROCEDURE — 99214 OFFICE O/P EST MOD 30 MIN: CPT | Performed by: INTERNAL MEDICINE

## 2018-10-04 PROCEDURE — G0463 HOSPITAL OUTPT CLINIC VISIT: HCPCS | Performed by: INTERNAL MEDICINE

## 2018-10-04 RX ORDER — LORAZEPAM 2 MG/1
TABLET ORAL
Qty: 45 TABLET | Refills: 0 | Status: SHIPPED | OUTPATIENT
Start: 2018-10-04 | End: 2018-10-27

## 2018-10-04 NOTE — PROGRESS NOTES
HPI:    Patient ID: Dorcas Mercado is a 76year old male. Diabetes   He presents for his follow-up diabetic visit. He has type 2 diabetes mellitus. His disease course has been stable. There are no diabetic associated symptoms.  There are no hypoglycemi TraMADol HCl 50 MG Oral Tab Take 1-2 tablets ( mg total) by mouth every 6 (six) hours as needed for Pain. Disp: 60 tablet Rfl: 0   ibuprofen 200 MG Oral Tab Take 200 mg by mouth every 6 (six) hours as needed for Pain.  Disp:  Rfl:    ASPIRIN OR Take 6.3 so his diabetes is well controlled with current diabetic medication which we will continue. Patient has not seen ophthalmologist for 3 years so referral was given for diabetic eye examination.   He will check with Dr. Veronica Malagon if he is able to get a flu sh

## 2018-10-16 ENCOUNTER — LABORATORY ENCOUNTER (OUTPATIENT)
Dept: HEMATOLOGY/ONCOLOGY | Facility: HOSPITAL | Age: 68
End: 2018-10-16
Attending: INTERNAL MEDICINE
Payer: MEDICARE

## 2018-10-16 VITALS
SYSTOLIC BLOOD PRESSURE: 118 MMHG | HEART RATE: 90 BPM | BODY MASS INDEX: 24.59 KG/M2 | HEIGHT: 66 IN | WEIGHT: 153 LBS | DIASTOLIC BLOOD PRESSURE: 60 MMHG | TEMPERATURE: 98 F | RESPIRATION RATE: 18 BRPM

## 2018-10-16 DIAGNOSIS — C64.9 METASTATIC RENAL CELL CARCINOMA, UNSPECIFIED LATERALITY (HCC): Primary | ICD-10-CM

## 2018-10-16 DIAGNOSIS — Z51.11 CHEMOTHERAPY MANAGEMENT, ENCOUNTER FOR: ICD-10-CM

## 2018-10-16 DIAGNOSIS — D64.9 ANEMIA, UNSPECIFIED TYPE: ICD-10-CM

## 2018-10-16 DIAGNOSIS — C78.00 MALIGNANT NEOPLASM METASTATIC TO LUNG, UNSPECIFIED LATERALITY (HCC): ICD-10-CM

## 2018-10-16 PROCEDURE — 85025 COMPLETE CBC W/AUTO DIFF WBC: CPT

## 2018-10-16 PROCEDURE — 80053 COMPREHEN METABOLIC PANEL: CPT

## 2018-10-16 PROCEDURE — 99215 OFFICE O/P EST HI 40 MIN: CPT | Performed by: INTERNAL MEDICINE

## 2018-10-16 NOTE — PROGRESS NOTES
Cancer Center Progress Note    Patient Name: Chanel Dang   YOB: 1950   Medical Record Number: [id number]   Attending Physician: Farideh Aceves M.D.        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 , Disp: , Rfl:   •  nicotine 14 MG/24HR Transdermal Patch 24 Hr, Place 1 patch onto the skin daily. , Disp: 30 patch, Rfl: 0    Allergies:  No Known Allergies     Review of Systems:  All other systems reviewed an 2018Bradffili Ronquillo has intermediate risk metastatic disease therefore we recommend first-line combination immunotherapy with nivolumab and ipilimumab.   Was started at the end of September 2018  [address] with cycle #2 of nivolumab and ipilimumab  –Mild anemia okay

## 2018-10-17 ENCOUNTER — OFFICE VISIT (OUTPATIENT)
Dept: HEMATOLOGY/ONCOLOGY | Facility: HOSPITAL | Age: 68
End: 2018-10-17
Attending: INTERNAL MEDICINE
Payer: MEDICARE

## 2018-10-17 VITALS
DIASTOLIC BLOOD PRESSURE: 51 MMHG | HEART RATE: 86 BPM | SYSTOLIC BLOOD PRESSURE: 103 MMHG | TEMPERATURE: 98 F | RESPIRATION RATE: 16 BRPM

## 2018-10-17 DIAGNOSIS — C64.9 METASTATIC RENAL CELL CARCINOMA, UNSPECIFIED LATERALITY (HCC): Primary | ICD-10-CM

## 2018-10-17 PROCEDURE — A4216 STERILE WATER/SALINE, 10 ML: HCPCS

## 2018-10-17 PROCEDURE — 96417 CHEMO IV INFUS EACH ADDL SEQ: CPT

## 2018-10-17 PROCEDURE — 96413 CHEMO IV INFUSION 1 HR: CPT

## 2018-10-17 RX ORDER — SODIUM CHLORIDE 9 MG/ML
INJECTION, SOLUTION INTRAVENOUS
Status: DISCONTINUED
Start: 2018-10-17 | End: 2018-10-17

## 2018-10-17 RX ORDER — 0.9 % SODIUM CHLORIDE 0.9 %
VIAL (ML) INJECTION
Status: DISCONTINUED
Start: 2018-10-17 | End: 2018-10-17

## 2018-10-17 NOTE — PROGRESS NOTES
Pt here for C2 D1 Opdivo/Yervoy. Arrives Ambulating independently, accompanied by Family member, wife and daughter.  Daughter translated for patient, refused language line     Modifications in dose or schedule: no    Frequency of blood return and site chec

## 2018-10-28 NOTE — TELEPHONE ENCOUNTER
No Protocol on this med.    Requested Prescriptions     Pending Prescriptions Disp Refills   • LORazepam 2 MG Oral Tab [Pharmacy Med Name: LORAZEPAM 2 MG TABLET] 45 tablet 0     Sig: TAKE 1/2 TABLET BY MOUTH EVERY MORNING AND 1 TABLET BY MOUTH EVERY NIGHT A

## 2018-10-30 RX ORDER — LORAZEPAM 2 MG/1
TABLET ORAL
Qty: 45 TABLET | Refills: 0 | Status: SHIPPED
Start: 2018-10-30 | End: 2018-11-30

## 2018-10-30 NOTE — TELEPHONE ENCOUNTER
Per printed script from Dr. Rian Graves, refill called to  [address] [facility] for Lorazepam 2 mg tab, take 1/2 tab every morning and 1 tab at bedtime. No additional refills.

## 2018-11-01 RX ORDER — LORAZEPAM 2 MG/1
TABLET ORAL
Qty: 45 TABLET | Refills: 0 | OUTPATIENT
Start: 2018-11-01

## 2018-11-06 ENCOUNTER — LABORATORY ENCOUNTER (OUTPATIENT)
Dept: HEMATOLOGY/ONCOLOGY | Facility: HOSPITAL | Age: 68
End: 2018-11-06
Attending: INTERNAL MEDICINE
Payer: MEDICARE

## 2018-11-06 VITALS
WEIGHT: 155 LBS | BODY MASS INDEX: 24.91 KG/M2 | DIASTOLIC BLOOD PRESSURE: 61 MMHG | TEMPERATURE: 98 F | RESPIRATION RATE: 16 BRPM | SYSTOLIC BLOOD PRESSURE: 115 MMHG | HEART RATE: 83 BPM | HEIGHT: 66 IN

## 2018-11-06 DIAGNOSIS — C64.9 METASTATIC RENAL CELL CARCINOMA, UNSPECIFIED LATERALITY (HCC): Primary | ICD-10-CM

## 2018-11-06 DIAGNOSIS — C78.00 MALIGNANT NEOPLASM METASTATIC TO LUNG, UNSPECIFIED LATERALITY (HCC): ICD-10-CM

## 2018-11-06 DIAGNOSIS — D64.9 ANEMIA, UNSPECIFIED TYPE: ICD-10-CM

## 2018-11-06 DIAGNOSIS — Z51.11 CHEMOTHERAPY MANAGEMENT, ENCOUNTER FOR: ICD-10-CM

## 2018-11-06 PROCEDURE — 80053 COMPREHEN METABOLIC PANEL: CPT

## 2018-11-06 PROCEDURE — 99215 OFFICE O/P EST HI 40 MIN: CPT | Performed by: INTERNAL MEDICINE

## 2018-11-06 PROCEDURE — 85025 COMPLETE CBC W/AUTO DIFF WBC: CPT

## 2018-11-06 RX ORDER — ACETAMINOPHEN 500 MG
500 TABLET ORAL EVERY 6 HOURS PRN
COMMUNITY
End: 2019-02-26

## 2018-11-06 NOTE — PROGRESS NOTES
Cancer Center Progress Note    Patient Name: Suzan Peacock   YOB: 1950   Medical Record Number: [id number]   Attending Physician: Juhi Jolley M.D.        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   , Disp: , Rfl:   •  nicotine 14 MG/24HR Transdermal Pa diabetes mellitus hyperlipidemia, smoking and previous heavy alcohol use, with metastatic renal cell carcinoma clear cell with pulmonary metastasis with a right lung mass in September 2018.   –he has intermediate risk metastatic disease therefore we recomme

## 2018-11-07 ENCOUNTER — OFFICE VISIT (OUTPATIENT)
Dept: HEMATOLOGY/ONCOLOGY | Facility: HOSPITAL | Age: 68
End: 2018-11-07
Attending: INTERNAL MEDICINE
Payer: MEDICARE

## 2018-11-07 VITALS
HEART RATE: 84 BPM | TEMPERATURE: 98 F | SYSTOLIC BLOOD PRESSURE: 108 MMHG | DIASTOLIC BLOOD PRESSURE: 60 MMHG | RESPIRATION RATE: 16 BRPM

## 2018-11-07 DIAGNOSIS — C64.9 METASTATIC RENAL CELL CARCINOMA, UNSPECIFIED LATERALITY (HCC): Primary | ICD-10-CM

## 2018-11-07 PROCEDURE — A4216 STERILE WATER/SALINE, 10 ML: HCPCS

## 2018-11-07 PROCEDURE — 96417 CHEMO IV INFUS EACH ADDL SEQ: CPT

## 2018-11-07 PROCEDURE — 96413 CHEMO IV INFUSION 1 HR: CPT

## 2018-11-07 RX ORDER — SODIUM CHLORIDE 9 MG/ML
INJECTION, SOLUTION INTRAVENOUS
Status: DISCONTINUED
Start: 2018-11-07 | End: 2018-11-07

## 2018-11-07 RX ORDER — 0.9 % SODIUM CHLORIDE 0.9 %
VIAL (ML) INJECTION
Status: DISCONTINUED
Start: 2018-11-07 | End: 2018-11-07

## 2018-11-07 NOTE — PROGRESS NOTES
Pt here for C3D1 Opdivo/Yervoy.   Arrives Ambulating independently, accompanied by Family member, wife Wife translated for patient, refused language line      Modifications in dose or schedule: no        Frequency of blood return and site check throughout a

## 2018-11-27 ENCOUNTER — OFFICE VISIT (OUTPATIENT)
Dept: HEMATOLOGY/ONCOLOGY | Facility: HOSPITAL | Age: 68
End: 2018-11-27
Attending: INTERNAL MEDICINE
Payer: MEDICARE

## 2018-11-27 VITALS
WEIGHT: 153 LBS | RESPIRATION RATE: 18 BRPM | TEMPERATURE: 98 F | DIASTOLIC BLOOD PRESSURE: 61 MMHG | SYSTOLIC BLOOD PRESSURE: 98 MMHG | HEART RATE: 92 BPM | BODY MASS INDEX: 24.59 KG/M2 | HEIGHT: 66 IN

## 2018-11-27 DIAGNOSIS — C64.9 METASTATIC RENAL CELL CARCINOMA, UNSPECIFIED LATERALITY (HCC): Primary | ICD-10-CM

## 2018-11-27 DIAGNOSIS — Z51.11 CHEMOTHERAPY MANAGEMENT, ENCOUNTER FOR: ICD-10-CM

## 2018-11-27 DIAGNOSIS — D64.9 ANEMIA, UNSPECIFIED TYPE: ICD-10-CM

## 2018-11-27 DIAGNOSIS — C78.00 MALIGNANT NEOPLASM METASTATIC TO LUNG, UNSPECIFIED LATERALITY (HCC): ICD-10-CM

## 2018-11-27 PROCEDURE — 85025 COMPLETE CBC W/AUTO DIFF WBC: CPT

## 2018-11-27 PROCEDURE — 80053 COMPREHEN METABOLIC PANEL: CPT

## 2018-11-27 PROCEDURE — 36415 COLL VENOUS BLD VENIPUNCTURE: CPT

## 2018-11-27 PROCEDURE — 99215 OFFICE O/P EST HI 40 MIN: CPT | Performed by: INTERNAL MEDICINE

## 2018-11-27 RX ORDER — TRAMADOL HYDROCHLORIDE 50 MG/1
TABLET ORAL EVERY 6 HOURS PRN
Qty: 90 TABLET | Refills: 0 | Status: SHIPPED | OUTPATIENT
Start: 2018-11-27 | End: 2019-02-26

## 2018-11-27 NOTE — PROGRESS NOTES
Cancer Center Progress Note    Patient Name: Suzan Peacock   YOB: 1950   Medical Record Number: [id number]   Attending Physician: Juhi Jolley M.D.        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   , Disp: , Rfl:   •  nicotine 14 MG/24HR Transdermal Pa hyperlipidemia, smoking and previous heavy alcohol use, with metastatic renal cell carcinoma clear cell with pulmonary metastasis with a right lung mass in September 2018.   –he has intermediate risk metastatic disease therefore we recommend first-line comb

## 2018-11-28 ENCOUNTER — OFFICE VISIT (OUTPATIENT)
Dept: HEMATOLOGY/ONCOLOGY | Facility: HOSPITAL | Age: 68
End: 2018-11-28
Attending: INTERNAL MEDICINE
Payer: MEDICARE

## 2018-11-28 VITALS
SYSTOLIC BLOOD PRESSURE: 104 MMHG | TEMPERATURE: 98 F | DIASTOLIC BLOOD PRESSURE: 60 MMHG | RESPIRATION RATE: 16 BRPM | HEART RATE: 86 BPM

## 2018-11-28 DIAGNOSIS — C64.9 METASTATIC RENAL CELL CARCINOMA, UNSPECIFIED LATERALITY (HCC): Primary | ICD-10-CM

## 2018-11-28 PROCEDURE — 96417 CHEMO IV INFUS EACH ADDL SEQ: CPT

## 2018-11-28 PROCEDURE — A4216 STERILE WATER/SALINE, 10 ML: HCPCS

## 2018-11-28 PROCEDURE — 96413 CHEMO IV INFUSION 1 HR: CPT

## 2018-11-28 RX ORDER — SODIUM CHLORIDE 9 MG/ML
INJECTION, SOLUTION INTRAVENOUS
Status: DISCONTINUED
Start: 2018-11-28 | End: 2018-11-28

## 2018-11-28 RX ORDER — 0.9 % SODIUM CHLORIDE 0.9 %
VIAL (ML) INJECTION
Status: DISCONTINUED
Start: 2018-11-28 | End: 2018-11-28

## 2018-11-28 NOTE — PROGRESS NOTES
Pt here for C4D1 Opdivo/Yervoy. Arrives Ambulating independently, accompanied by Family member, wife and daughter.  Daughter translated for patient, refused language line     Modifications in dose or schedule: no    Frequency of blood return and site check

## 2018-11-29 RX ORDER — LORAZEPAM 2 MG/1
TABLET ORAL
Qty: 45 TABLET | Refills: 0 | OUTPATIENT
Start: 2018-11-29

## 2018-11-29 NOTE — TELEPHONE ENCOUNTER
Review pended refill request as it does not fall under a protocol.     Last Rx:   10/30/18   #45  LOV: 10/4/18

## 2018-11-30 NOTE — TELEPHONE ENCOUNTER
Pt daughter Gely Lines to follow up on the refill request .  She stated Pt will be out of the medication tomorrow 12/1

## 2018-12-01 RX ORDER — LORAZEPAM 2 MG/1
TABLET ORAL
Qty: 45 TABLET | Refills: 0 | OUTPATIENT
Start: 2018-12-01

## 2018-12-01 RX ORDER — LORAZEPAM 2 MG/1
TABLET ORAL
Qty: 45 TABLET | Refills: 0 | OUTPATIENT
Start: 2018-12-01 | End: 2018-12-25

## 2018-12-01 NOTE — TELEPHONE ENCOUNTER
No Protocol on this med.      Requested Prescriptions     Pending Prescriptions Disp Refills   • LORazepam 2 MG Oral Tab 45 tablet 0     Sig: TAKE 1/2 TABLET BY MOUTH EVERY MORNING AND 1 TABLET BY MOUTH EVERY NIGHT AT BEDTIME       Last Office Visit with YUNI

## 2018-12-01 NOTE — TELEPHONE ENCOUNTER
Daughter's  is calling and follow up the prescription, advised that it was approved today and will call the pharmacy to phone in the prescription today, will wait for the pharmacy to call them back if it is ready for .     Phoned in rx  Today=

## 2018-12-01 NOTE — TELEPHONE ENCOUNTER
Soft transferred to this nurse, daughter really wants the refill now, as patient took the last pill today.   Message sent to Dr Mariana Ruiz via [employer] to check the refill request.

## 2018-12-15 ENCOUNTER — HOSPITAL ENCOUNTER (OUTPATIENT)
Dept: CT IMAGING | Facility: HOSPITAL | Age: 68
Discharge: HOME | End: 2018-12-15
Attending: INTERNAL MEDICINE
Payer: MEDICARE

## 2018-12-15 DIAGNOSIS — C78.00 MALIGNANT NEOPLASM METASTATIC TO LUNG, UNSPECIFIED LATERALITY (HCC): ICD-10-CM

## 2018-12-15 DIAGNOSIS — C64.9 METASTATIC RENAL CELL CARCINOMA, UNSPECIFIED LATERALITY (HCC): ICD-10-CM

## 2018-12-15 PROCEDURE — 71260 CT THORAX DX C+: CPT | Performed by: INTERNAL MEDICINE

## 2018-12-15 PROCEDURE — 82565 ASSAY OF CREATININE: CPT

## 2018-12-15 PROCEDURE — 74177 CT ABD & PELVIS W/CONTRAST: CPT | Performed by: INTERNAL MEDICINE

## 2018-12-18 ENCOUNTER — APPOINTMENT (OUTPATIENT)
Dept: HEMATOLOGY/ONCOLOGY | Facility: HOSPITAL | Age: 68
End: 2018-12-18
Attending: INTERNAL MEDICINE
Payer: MEDICARE

## 2018-12-19 ENCOUNTER — OFFICE VISIT (OUTPATIENT)
Dept: HEMATOLOGY/ONCOLOGY | Facility: HOSPITAL | Age: 68
End: 2018-12-19
Attending: INTERNAL MEDICINE
Payer: MEDICARE

## 2018-12-19 ENCOUNTER — OFFICE VISIT (OUTPATIENT)
Dept: PULMONOLOGY | Facility: CLINIC | Age: 68
End: 2018-12-19
Payer: MEDICARE

## 2018-12-19 VITALS
DIASTOLIC BLOOD PRESSURE: 50 MMHG | HEART RATE: 85 BPM | WEIGHT: 155 LBS | BODY MASS INDEX: 24.91 KG/M2 | TEMPERATURE: 98 F | HEIGHT: 66 IN | SYSTOLIC BLOOD PRESSURE: 96 MMHG | RESPIRATION RATE: 16 BRPM

## 2018-12-19 VITALS
HEIGHT: 66 IN | HEART RATE: 77 BPM | DIASTOLIC BLOOD PRESSURE: 66 MMHG | WEIGHT: 159 LBS | SYSTOLIC BLOOD PRESSURE: 110 MMHG | OXYGEN SATURATION: 97 % | BODY MASS INDEX: 25.55 KG/M2

## 2018-12-19 VITALS
SYSTOLIC BLOOD PRESSURE: 96 MMHG | TEMPERATURE: 98 F | WEIGHT: 155 LBS | RESPIRATION RATE: 16 BRPM | HEIGHT: 66 IN | DIASTOLIC BLOOD PRESSURE: 50 MMHG | BODY MASS INDEX: 24.91 KG/M2 | HEART RATE: 85 BPM

## 2018-12-19 DIAGNOSIS — C64.9 METASTATIC RENAL CELL CARCINOMA, UNSPECIFIED LATERALITY (HCC): Primary | ICD-10-CM

## 2018-12-19 DIAGNOSIS — C64.9 MALIGNANT NEOPLASM OF KIDNEY, UNSPECIFIED LATERALITY (HCC): Primary | ICD-10-CM

## 2018-12-19 DIAGNOSIS — C78.00 MALIGNANT NEOPLASM METASTATIC TO LUNG, UNSPECIFIED LATERALITY (HCC): ICD-10-CM

## 2018-12-19 DIAGNOSIS — E87.1 HYPONATREMIA: ICD-10-CM

## 2018-12-19 DIAGNOSIS — D64.9 ANEMIA, UNSPECIFIED TYPE: ICD-10-CM

## 2018-12-19 DIAGNOSIS — Z51.11 CHEMOTHERAPY MANAGEMENT, ENCOUNTER FOR: ICD-10-CM

## 2018-12-19 PROCEDURE — G0463 HOSPITAL OUTPT CLINIC VISIT: HCPCS | Performed by: INTERNAL MEDICINE

## 2018-12-19 PROCEDURE — 99214 OFFICE O/P EST MOD 30 MIN: CPT | Performed by: INTERNAL MEDICINE

## 2018-12-19 PROCEDURE — 84443 ASSAY THYROID STIM HORMONE: CPT

## 2018-12-19 PROCEDURE — 85025 COMPLETE CBC W/AUTO DIFF WBC: CPT

## 2018-12-19 PROCEDURE — 99215 OFFICE O/P EST HI 40 MIN: CPT | Performed by: INTERNAL MEDICINE

## 2018-12-19 PROCEDURE — A4216 STERILE WATER/SALINE, 10 ML: HCPCS

## 2018-12-19 PROCEDURE — 36415 COLL VENOUS BLD VENIPUNCTURE: CPT

## 2018-12-19 PROCEDURE — 96413 CHEMO IV INFUSION 1 HR: CPT

## 2018-12-19 PROCEDURE — 80053 COMPREHEN METABOLIC PANEL: CPT

## 2018-12-19 RX ORDER — SODIUM CHLORIDE 9 MG/ML
INJECTION, SOLUTION INTRAVENOUS
Status: COMPLETED
Start: 2018-12-19 | End: 2018-12-19

## 2018-12-19 RX ORDER — HYDROCODONE BITARTRATE AND ACETAMINOPHEN 5; 325 MG/1; MG/1
1-2 TABLET ORAL EVERY 4 HOURS PRN
Qty: 90 TABLET | Refills: 0 | Status: SHIPPED | OUTPATIENT
Start: 2018-12-19 | End: 2018-12-31

## 2018-12-19 RX ORDER — 0.9 % SODIUM CHLORIDE 0.9 %
VIAL (ML) INJECTION
Status: DISCONTINUED
Start: 2018-12-19 | End: 2018-12-19

## 2018-12-19 RX ORDER — SODIUM CHLORIDE 9 MG/ML
INJECTION, SOLUTION INTRAVENOUS
Status: DISCONTINUED
Start: 2018-12-19 | End: 2018-12-19

## 2018-12-19 RX ADMIN — SODIUM CHLORIDE 1000 ML: 9 INJECTION, SOLUTION INTRAVENOUS at 12:05:00

## 2018-12-19 NOTE — PROGRESS NOTES
HPI:    Patient ID: Vesta Bueno is a 76year old male.     HPI  clinically doing well   Tolerated immunotherapy   No dyspnea at rest   Mild TRUONG / still active   Good appetite   Gained few lbs   Mild right sided chest pain   No cough or hemoptysis place, and time. He appears well-nourished. No distress. Cardiovascular: Normal rate. Exam reveals no gallop. No murmur heard. Pulmonary/Chest: He has no wheezes. He has no rales.    Diminished in the right base    Abdominal: Bowel sounds are normal.

## 2018-12-19 NOTE — PROGRESS NOTES
Pt here for C5D1 Opdivo. Arrives Ambulating independently, accompanied by Family member, wife and son in law. Son in law translated for patient, refused language line. 1 liter normal saline ordered as sodium was low.  Encouraged use of gatorade, and salt p

## 2018-12-19 NOTE — PROGRESS NOTES
Cancer Center Progress Note    Patient Name: Nadia Montes De Oca   YOB: 1950   Medical Record Number: [id number]   Attending Physician: Adriana Villagran M.D.        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   , Disp: , Rfl:   •  nicotine 14 MG/24HR Transdermal Patch a moderate-sized right   pleural effusion on the prior exam which may have obscured some of the metastases. Findings may relate to disease progression versus pseudo-progression from recent treatment initiation. Continued close followup recommended.      Mul related pain we will add hydrocodone  –Mild anemia okay to monitor    Risk Assessment: High new adenosis metastatic renal cell carcinoma chemotherapy encounter    Porsha Juan MD

## 2018-12-27 RX ORDER — LORAZEPAM 2 MG/1
TABLET ORAL
Qty: 45 TABLET | Refills: 0 | Status: SHIPPED
Start: 2018-12-27 | End: 2019-01-28

## 2018-12-27 NOTE — TELEPHONE ENCOUNTER
Called [facility] pharmacy and left voicemail with rx approval as authorized by Dr. Viviana Livingston

## 2018-12-31 ENCOUNTER — NURSE ONLY (OUTPATIENT)
Dept: HEMATOLOGY/ONCOLOGY | Facility: HOSPITAL | Age: 68
End: 2018-12-31
Attending: INTERNAL MEDICINE
Payer: MEDICARE

## 2018-12-31 VITALS
TEMPERATURE: 98 F | BODY MASS INDEX: 24.55 KG/M2 | HEIGHT: 66 IN | RESPIRATION RATE: 18 BRPM | SYSTOLIC BLOOD PRESSURE: 105 MMHG | WEIGHT: 152.75 LBS | HEART RATE: 99 BPM | DIASTOLIC BLOOD PRESSURE: 61 MMHG

## 2018-12-31 DIAGNOSIS — G89.3 CANCER RELATED PAIN: ICD-10-CM

## 2018-12-31 DIAGNOSIS — C64.9 METASTATIC RENAL CELL CARCINOMA, UNSPECIFIED LATERALITY (HCC): Primary | ICD-10-CM

## 2018-12-31 DIAGNOSIS — Z51.11 CHEMOTHERAPY MANAGEMENT, ENCOUNTER FOR: ICD-10-CM

## 2018-12-31 DIAGNOSIS — C78.00 MALIGNANT NEOPLASM METASTATIC TO LUNG, UNSPECIFIED LATERALITY (HCC): ICD-10-CM

## 2018-12-31 DIAGNOSIS — D64.9 ANEMIA, UNSPECIFIED TYPE: ICD-10-CM

## 2018-12-31 PROCEDURE — 85025 COMPLETE CBC W/AUTO DIFF WBC: CPT

## 2018-12-31 PROCEDURE — 99215 OFFICE O/P EST HI 40 MIN: CPT | Performed by: INTERNAL MEDICINE

## 2018-12-31 PROCEDURE — 36415 COLL VENOUS BLD VENIPUNCTURE: CPT

## 2018-12-31 PROCEDURE — 80053 COMPREHEN METABOLIC PANEL: CPT

## 2018-12-31 RX ORDER — HYDROCODONE BITARTRATE AND ACETAMINOPHEN 5; 325 MG/1; MG/1
1-2 TABLET ORAL EVERY 4 HOURS PRN
Qty: 90 TABLET | Refills: 0 | Status: SHIPPED | OUTPATIENT
Start: 2018-12-31 | End: 2019-01-29

## 2018-12-31 NOTE — PROGRESS NOTES
Cancer Center Progress Note    Patient Name: Miya Klein   YOB: 1950   Medical Record Number: [id number]   Attending Physician: Oj Fernandez M.D.        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    H file      Transportation needs - non-medical: Not on file    Occupational History      Occupation: Arjun        Comment: retired    Tobacco Use      Smoking status: Former Smoker        Years: 50.00        Types: Cigarettes        Quit date: 8/31/2018 daily for 2 weeks, Disp: 14 patch, Rfl: 0  •  Prochlorperazine Maleate (COMPAZINE) 10 mg tablet, Take 1 tablet (10 mg total) by mouth every 6 (six) hours as needed for Nausea., Disp: 60 tablet, Rfl: 3  •  ibuprofen 200 MG Oral Tab, Take 200 mg by mouth agusto 12/31/2018    CO2 24 12/31/2018       Radiology:  CT CAP 12/15/18  Extensive necrotic lobulated soft tissue involving nearly the entire right hemithorax. Some of these areas have increased since size since the prior exam others have decreased.  A direct com Received nivolumab/ipilimumab for his first 4 cycles. There is a mixed response after 4 cycles. We will continue treatment given is possible to see pseudo-progression with immunotherapy.   We will plan to reimage in February  –Hyponatremia decrease free w

## 2019-01-01 ENCOUNTER — APPOINTMENT (OUTPATIENT)
Dept: RADIATION ONCOLOGY | Facility: HOSPITAL | Age: 69
End: 2019-01-01
Attending: RADIOLOGY
Payer: MEDICARE

## 2019-01-02 ENCOUNTER — OFFICE VISIT (OUTPATIENT)
Dept: HEMATOLOGY/ONCOLOGY | Facility: HOSPITAL | Age: 69
End: 2019-01-02
Attending: INTERNAL MEDICINE
Payer: MEDICARE

## 2019-01-02 VITALS
SYSTOLIC BLOOD PRESSURE: 117 MMHG | RESPIRATION RATE: 18 BRPM | TEMPERATURE: 98 F | HEART RATE: 93 BPM | DIASTOLIC BLOOD PRESSURE: 53 MMHG

## 2019-01-02 DIAGNOSIS — C64.9 METASTATIC RENAL CELL CARCINOMA, UNSPECIFIED LATERALITY (HCC): Primary | ICD-10-CM

## 2019-01-02 PROCEDURE — 96413 CHEMO IV INFUSION 1 HR: CPT

## 2019-01-02 PROCEDURE — A4216 STERILE WATER/SALINE, 10 ML: HCPCS

## 2019-01-02 RX ORDER — SODIUM CHLORIDE 9 MG/ML
INJECTION, SOLUTION INTRAVENOUS
Status: DISCONTINUED
Start: 2019-01-02 | End: 2019-01-02

## 2019-01-02 RX ORDER — 0.9 % SODIUM CHLORIDE 0.9 %
VIAL (ML) INJECTION
Status: DISCONTINUED
Start: 2019-01-02 | End: 2019-01-02

## 2019-01-02 NOTE — PROGRESS NOTES
Pt here for C6D1 Opdivo.  Arrives Ambulating independently, accompanied by Family member, wife Wife translated for patient, refused language line      Modifications in dose or schedule: no        Frequency of blood return and site check throughout administ

## 2019-01-14 ENCOUNTER — TELEPHONE (OUTPATIENT)
Dept: HEMATOLOGY/ONCOLOGY | Facility: HOSPITAL | Age: 69
End: 2019-01-14

## 2019-01-14 NOTE — TELEPHONE ENCOUNTER
Dtr calling to cancel labs for wed. Is taking pt for labs in [place] either today or tomorrow. Please enter pre chemo labs as outpt.  Thank you

## 2019-01-15 ENCOUNTER — LAB ENCOUNTER (OUTPATIENT)
Dept: LAB | Age: 69
End: 2019-01-15
Attending: INTERNAL MEDICINE
Payer: MEDICARE

## 2019-01-15 DIAGNOSIS — C64.9 METASTATIC RENAL CELL CARCINOMA, UNSPECIFIED LATERALITY (HCC): ICD-10-CM

## 2019-01-15 LAB
ALBUMIN SERPL BCP-MCNC: 3.7 G/DL (ref 3.5–4.8)
ALBUMIN/GLOB SERPL: 0.9 {RATIO} (ref 1–2)
ALP SERPL-CCNC: 81 U/L (ref 32–100)
ALT SERPL-CCNC: 11 U/L (ref 17–63)
ANION GAP SERPL CALC-SCNC: 10 MMOL/L (ref 0–18)
AST SERPL-CCNC: 13 U/L (ref 15–41)
BASOPHILS # BLD: 0.1 K/UL (ref 0–0.2)
BASOPHILS NFR BLD: 1 %
BILIRUB SERPL-MCNC: 0.5 MG/DL (ref 0.3–1.2)
BUN SERPL-MCNC: 12 MG/DL (ref 8–20)
BUN/CREAT SERPL: 14.3 (ref 10–20)
CALCIUM SERPL-MCNC: 8.8 MG/DL (ref 8.5–10.5)
CHLORIDE SERPL-SCNC: 99 MMOL/L (ref 95–110)
CO2 SERPL-SCNC: 24 MMOL/L (ref 22–32)
CREAT SERPL-MCNC: 0.84 MG/DL (ref 0.5–1.5)
EOSINOPHIL # BLD: 0.1 K/UL (ref 0–0.7)
EOSINOPHIL NFR BLD: 2 %
ERYTHROCYTE [DISTWIDTH] IN BLOOD BY AUTOMATED COUNT: 16.5 % (ref 11–15)
GLOBULIN PLAS-MCNC: 4.3 G/DL (ref 2.5–3.7)
GLUCOSE SERPL-MCNC: 185 MG/DL (ref 70–99)
HCT VFR BLD AUTO: 34.2 % (ref 41–52)
HGB BLD-MCNC: 11.4 G/DL (ref 13.5–17.5)
LYMPHOCYTES # BLD: 1 K/UL (ref 1–4)
LYMPHOCYTES NFR BLD: 24 %
MCH RBC QN AUTO: 27.6 PG (ref 27–32)
MCHC RBC AUTO-ENTMCNC: 33.3 G/DL (ref 32–37)
MCV RBC AUTO: 82.8 FL (ref 80–100)
MONOCYTES # BLD: 0.5 K/UL (ref 0–1)
MONOCYTES NFR BLD: 11 %
NEUTROPHILS # BLD AUTO: 2.7 K/UL (ref 1.8–7.7)
NEUTROPHILS NFR BLD: 62 %
OSMOLALITY UR CALC.SUM OF ELEC: 281 MOSM/KG (ref 275–295)
PATIENT FASTING: NO
PLATELET # BLD AUTO: 196 K/UL (ref 140–400)
PMV BLD AUTO: 8.7 FL (ref 7.4–10.3)
POTASSIUM SERPL-SCNC: 4.7 MMOL/L (ref 3.3–5.1)
PROT SERPL-MCNC: 8 G/DL (ref 5.9–8.4)
RBC # BLD AUTO: 4.12 M/UL (ref 4.5–5.9)
SODIUM SERPL-SCNC: 133 MMOL/L (ref 136–144)
TSH SERPL-ACNC: 1.73 UIU/ML (ref 0.45–5.33)
WBC # BLD AUTO: 4.3 K/UL (ref 4–11)

## 2019-01-15 PROCEDURE — 36415 COLL VENOUS BLD VENIPUNCTURE: CPT

## 2019-01-15 PROCEDURE — 85025 COMPLETE CBC W/AUTO DIFF WBC: CPT

## 2019-01-15 PROCEDURE — 80053 COMPREHEN METABOLIC PANEL: CPT

## 2019-01-15 PROCEDURE — 84443 ASSAY THYROID STIM HORMONE: CPT

## 2019-01-16 ENCOUNTER — OFFICE VISIT (OUTPATIENT)
Dept: HEMATOLOGY/ONCOLOGY | Facility: HOSPITAL | Age: 69
End: 2019-01-16
Attending: INTERNAL MEDICINE
Payer: MEDICARE

## 2019-01-16 VITALS
DIASTOLIC BLOOD PRESSURE: 57 MMHG | HEIGHT: 66 IN | BODY MASS INDEX: 25.08 KG/M2 | HEART RATE: 92 BPM | RESPIRATION RATE: 16 BRPM | TEMPERATURE: 98 F | OXYGEN SATURATION: 98 % | WEIGHT: 156.06 LBS | SYSTOLIC BLOOD PRESSURE: 109 MMHG

## 2019-01-16 VITALS
BODY MASS INDEX: 25 KG/M2 | DIASTOLIC BLOOD PRESSURE: 57 MMHG | OXYGEN SATURATION: 98 % | TEMPERATURE: 98 F | WEIGHT: 156.06 LBS | SYSTOLIC BLOOD PRESSURE: 109 MMHG | RESPIRATION RATE: 16 BRPM | HEART RATE: 92 BPM

## 2019-01-16 DIAGNOSIS — R07.9 CHEST PAIN, UNSPECIFIED TYPE: ICD-10-CM

## 2019-01-16 DIAGNOSIS — Z51.11 CHEMOTHERAPY MANAGEMENT, ENCOUNTER FOR: ICD-10-CM

## 2019-01-16 DIAGNOSIS — D64.9 ANEMIA, UNSPECIFIED TYPE: ICD-10-CM

## 2019-01-16 DIAGNOSIS — C64.9 METASTATIC RENAL CELL CARCINOMA, UNSPECIFIED LATERALITY (HCC): Primary | ICD-10-CM

## 2019-01-16 DIAGNOSIS — G89.3 CANCER RELATED PAIN: ICD-10-CM

## 2019-01-16 DIAGNOSIS — C78.00 MALIGNANT NEOPLASM METASTATIC TO LUNG, UNSPECIFIED LATERALITY (HCC): ICD-10-CM

## 2019-01-16 PROCEDURE — 96413 CHEMO IV INFUSION 1 HR: CPT

## 2019-01-16 PROCEDURE — 99215 OFFICE O/P EST HI 40 MIN: CPT | Performed by: INTERNAL MEDICINE

## 2019-01-16 RX ORDER — SODIUM CHLORIDE 9 MG/ML
INJECTION, SOLUTION INTRAVENOUS
Status: DISCONTINUED
Start: 2019-01-16 | End: 2019-01-16

## 2019-01-16 NOTE — PROGRESS NOTES
Pt here for C7D1 Opdivo.  Arrives Ambulating independently, accompanied by Family member, wife Wife translated for patient, refused language line      Modifications in dose or schedule: no        Frequency of blood return and site check throughout administ

## 2019-01-25 ENCOUNTER — TELEPHONE (OUTPATIENT)
Dept: HEMATOLOGY/ONCOLOGY | Facility: HOSPITAL | Age: 69
End: 2019-01-25

## 2019-01-25 NOTE — TELEPHONE ENCOUNTER
Pt's dtr Odilia isnt able to bring pt to his appts on 1/29 d/t her work schedule changing. She is requesting to move all appts to 1/30. She would also like to keep his future appts on Tues. Appts have been reschedule to 1/30 and next cycle is due on 2/12.

## 2019-01-26 ENCOUNTER — HOSPITAL ENCOUNTER (OUTPATIENT)
Dept: CT IMAGING | Facility: HOSPITAL | Age: 69
Discharge: HOME | End: 2019-01-26
Attending: INTERNAL MEDICINE
Payer: MEDICARE

## 2019-01-26 DIAGNOSIS — R07.9 CHEST PAIN, UNSPECIFIED TYPE: ICD-10-CM

## 2019-01-26 DIAGNOSIS — C64.9 METASTATIC RENAL CELL CARCINOMA, UNSPECIFIED LATERALITY (HCC): ICD-10-CM

## 2019-01-26 PROCEDURE — 71260 CT THORAX DX C+: CPT | Performed by: INTERNAL MEDICINE

## 2019-01-28 RX ORDER — LORAZEPAM 2 MG/1
TABLET ORAL
Qty: 45 TABLET | Refills: 0 | Status: SHIPPED
Start: 2019-01-28 | End: 2019-02-25

## 2019-01-28 NOTE — TELEPHONE ENCOUNTER
[address] [place] [address] called and requested to move pt's lab, pre chemo and chemo appt from Wed 1/30 to tomorrow 1/29 d/t her 's work availability.

## 2019-01-28 NOTE — TELEPHONE ENCOUNTER
Per printed script from Dr. Katelynn Nguyen, refill called to [address] [place] [address] Mail for Lorazepam 2 mg tab, qty # 45. Take 1/2 tab every morning and 1 tab at bedtime. No additional refills.

## 2019-01-29 ENCOUNTER — APPOINTMENT (OUTPATIENT)
Dept: HEMATOLOGY/ONCOLOGY | Facility: HOSPITAL | Age: 69
End: 2019-01-29
Attending: INTERNAL MEDICINE
Payer: MEDICARE

## 2019-01-29 VITALS
TEMPERATURE: 99 F | BODY MASS INDEX: 25.26 KG/M2 | DIASTOLIC BLOOD PRESSURE: 54 MMHG | SYSTOLIC BLOOD PRESSURE: 106 MMHG | RESPIRATION RATE: 16 BRPM | WEIGHT: 157.19 LBS | HEIGHT: 66 IN | HEART RATE: 94 BPM

## 2019-01-29 DIAGNOSIS — C64.9 METASTATIC RENAL CELL CARCINOMA, UNSPECIFIED LATERALITY (HCC): Primary | ICD-10-CM

## 2019-01-29 DIAGNOSIS — C78.00 MALIGNANT NEOPLASM METASTATIC TO LUNG, UNSPECIFIED LATERALITY (HCC): ICD-10-CM

## 2019-01-29 DIAGNOSIS — Z51.11 CHEMOTHERAPY MANAGEMENT, ENCOUNTER FOR: ICD-10-CM

## 2019-01-29 LAB
ALBUMIN SERPL BCP-MCNC: 3.7 G/DL (ref 3.5–4.8)
ALBUMIN/GLOB SERPL: 0.9 {RATIO} (ref 1–2)
ALP SERPL-CCNC: 68 U/L (ref 32–100)
ALT SERPL-CCNC: 10 U/L (ref 17–63)
ANION GAP SERPL CALC-SCNC: 11 MMOL/L (ref 0–18)
AST SERPL-CCNC: 14 U/L (ref 15–41)
BASOPHILS # BLD AUTO: 0.04 X10(3) UL (ref 0–0.2)
BASOPHILS NFR BLD AUTO: 1.3 %
BILIRUB SERPL-MCNC: 0.6 MG/DL (ref 0.3–1.2)
BUN SERPL-MCNC: 11 MG/DL (ref 8–20)
BUN/CREAT SERPL: 13.4 (ref 10–20)
CALCIUM SERPL-MCNC: 8.7 MG/DL (ref 8.5–10.5)
CHLORIDE SERPL-SCNC: 97 MMOL/L (ref 95–110)
CO2 SERPL-SCNC: 22 MMOL/L (ref 22–32)
CREAT SERPL-MCNC: 0.82 MG/DL (ref 0.5–1.5)
DEPRECATED RDW RBC AUTO: 47.6 FL (ref 35.1–46.3)
EOSINOPHIL # BLD AUTO: 0.09 X10(3) UL (ref 0–0.7)
EOSINOPHIL NFR BLD AUTO: 2.8 %
ERYTHROCYTE [DISTWIDTH] IN BLOOD BY AUTOMATED COUNT: 15.8 % (ref 11–15)
GLOBULIN PLAS-MCNC: 4.2 G/DL (ref 2.5–3.7)
GLUCOSE SERPL-MCNC: 163 MG/DL (ref 70–99)
HCT VFR BLD AUTO: 31.8 % (ref 39–53)
HGB BLD-MCNC: 10.4 G/DL (ref 13–17.5)
IMM GRANULOCYTES # BLD AUTO: 0.01 X10(3) UL (ref 0–1)
IMM GRANULOCYTES NFR BLD: 0.3 %
LYMPHOCYTES # BLD AUTO: 0.76 X10(3) UL (ref 1–4)
LYMPHOCYTES NFR BLD AUTO: 23.9 %
MCH RBC QN AUTO: 27.6 PG (ref 26–34)
MCHC RBC AUTO-ENTMCNC: 32.7 G/DL (ref 31–37)
MCV RBC AUTO: 84.4 FL (ref 80–100)
MONOCYTES # BLD AUTO: 0.37 X10(3) UL (ref 0.1–1)
MONOCYTES NFR BLD AUTO: 11.6 %
NEUTROPHILS # BLD AUTO: 1.91 X10 (3) UL (ref 1.5–7.7)
NEUTROPHILS # BLD AUTO: 1.91 X10(3) UL (ref 1.5–7.7)
NEUTROPHILS NFR BLD AUTO: 60.1 %
OSMOLALITY UR CALC.SUM OF ELEC: 273 MOSM/KG (ref 275–295)
PATIENT FASTING: NO
PLATELET # BLD AUTO: 212 10(3)UL (ref 150–450)
POTASSIUM SERPL-SCNC: 4.2 MMOL/L (ref 3.3–5.1)
PROT SERPL-MCNC: 7.9 G/DL (ref 5.9–8.4)
RBC # BLD AUTO: 3.77 X10(6)UL (ref 3.8–5.8)
SODIUM SERPL-SCNC: 130 MMOL/L (ref 136–144)
WBC # BLD AUTO: 3.2 X10(3) UL (ref 4–11)

## 2019-01-29 PROCEDURE — 80053 COMPREHEN METABOLIC PANEL: CPT

## 2019-01-29 PROCEDURE — 99215 OFFICE O/P EST HI 40 MIN: CPT | Performed by: INTERNAL MEDICINE

## 2019-01-29 PROCEDURE — 96413 CHEMO IV INFUSION 1 HR: CPT

## 2019-01-29 PROCEDURE — 85025 COMPLETE CBC W/AUTO DIFF WBC: CPT

## 2019-01-29 RX ORDER — SODIUM CHLORIDE 9 MG/ML
INJECTION, SOLUTION INTRAVENOUS
Status: DISCONTINUED
Start: 2019-01-29 | End: 2019-01-29

## 2019-01-29 RX ORDER — HYDROCODONE BITARTRATE AND ACETAMINOPHEN 5; 325 MG/1; MG/1
1-2 TABLET ORAL EVERY 4 HOURS PRN
Qty: 90 TABLET | Refills: 0 | Status: SHIPPED | OUTPATIENT
Start: 2019-01-29 | End: 2019-02-12

## 2019-01-29 RX ORDER — 0.9 % SODIUM CHLORIDE 0.9 %
VIAL (ML) INJECTION
Status: DISCONTINUED
Start: 2019-01-29 | End: 2019-01-29

## 2019-01-29 NOTE — PROGRESS NOTES
Patient here for lab drawn, MD visit and possible treatment. PIV placed in left [facility] on first attempt- labs drawn and sent. IV flushed and capped. Escorted back to waiting room for MD visit.

## 2019-01-29 NOTE — PROGRESS NOTES
Cancer Center Progress Note    Patient Name: Jesus Wise   YOB: 1950   Medical Record Number: [id number]   Attending Physician: Jorge Bustillos M.D.        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    H inability: Not on file      Transportation needs - medical: Not on file      Transportation needs - non-medical: Not on file    Occupational History      Occupation:         Comment: retired    Tobacco Use      Smoking status: Former Smoker        Y then apply 1 patch (14mg) daily for 2 weeks, then apply 1 patch (7mg) daily for 2 weeks, Disp: 14 patch, Rfl: 0  •  Prochlorperazine Maleate (COMPAZINE) 10 mg tablet, Take 1 tablet (10 mg total) by mouth every 6 (six) hours as needed for Nausea., Disp: 60 01/29/2019    AGRATIO 1.4 09/24/2016     (L) 01/29/2019    K 4.2 01/29/2019    CL 97 01/29/2019    CO2 22 01/29/2019       Radiology:  CT chest personally reviewed overall pulmonary metastasis have not significantly changed    Impression and Plan:  6

## 2019-01-29 NOTE — PROGRESS NOTES
Pt here for C8D1 Opdivo. Arrives Ambulating independently, accompanied by Spouse and Family member, son in law        Patient is primarily [address] speaking- refused  prefers patient family member to translate-  All questions answered.   Modificat

## 2019-01-30 ENCOUNTER — APPOINTMENT (OUTPATIENT)
Dept: HEMATOLOGY/ONCOLOGY | Facility: HOSPITAL | Age: 69
End: 2019-01-30
Attending: INTERNAL MEDICINE
Payer: MEDICARE

## 2019-01-31 ENCOUNTER — OFFICE VISIT (OUTPATIENT)
Dept: RADIATION ONCOLOGY | Facility: HOSPITAL | Age: 69
End: 2019-01-31
Attending: RADIOLOGY
Payer: MEDICARE

## 2019-01-31 ENCOUNTER — SNF/IP PROF CHARGE ONLY (OUTPATIENT)
Dept: HEMATOLOGY/ONCOLOGY | Facility: HOSPITAL | Age: 69
End: 2019-01-31

## 2019-01-31 VITALS
WEIGHT: 157.81 LBS | SYSTOLIC BLOOD PRESSURE: 106 MMHG | HEART RATE: 90 BPM | RESPIRATION RATE: 16 BRPM | HEIGHT: 66 IN | DIASTOLIC BLOOD PRESSURE: 54 MMHG | BODY MASS INDEX: 25.36 KG/M2 | TEMPERATURE: 98 F

## 2019-01-31 DIAGNOSIS — C64.9 METASTATIC RENAL CELL CARCINOMA, UNSPECIFIED LATERALITY (HCC): ICD-10-CM

## 2019-01-31 DIAGNOSIS — C79.51 SECONDARY CANCER OF BONE (HCC): Primary | ICD-10-CM

## 2019-01-31 DIAGNOSIS — E78.2 MIXED HYPERLIPIDEMIA: ICD-10-CM

## 2019-01-31 PROCEDURE — G9678 ONCOLOGY CARE MODEL SERVICE: HCPCS | Performed by: INTERNAL MEDICINE

## 2019-01-31 PROCEDURE — 99212 OFFICE O/P EST SF 10 MIN: CPT

## 2019-01-31 NOTE — TELEPHONE ENCOUNTER
RESPONSE REQUESTED: Request to close potential gap in therapy    Message: Dear Prescriber, we spoke to you patient about diabetes care and noticed your aptient previously recived statin therapy but has not filled it at a [facility] pharmacy in the last 180 days.

## 2019-01-31 NOTE — PROGRESS NOTES
Primary language:  Nepali  Language line required? yes  Comprehension Ability:  good  Able to read?  yes  Able to write?   yes  Communication tools:  Language line and Son, Rachelle Emery prefers to interpret  Patient's ability to learn:  good  Readiness to [employer]

## 2019-01-31 NOTE — PROGRESS NOTES
Nursing Consultation Note  Patient: Belem Vitale  YOB: 1950  Age: 76year old  Radiation Oncologist: Dr. Cynthia Emerson  Referring Physician: Андрей Ni  Diagnosis:No diagnosis found.   Consult Date: 1/31/2019      Chemotherapy: Please let patient know that it looks like she has a UTI, which could be contributing to the discomfort she's experiencing with urination.  I sent in Cipro BID x 7 days to her Amish Sandoval.  Please remind her to /start.      Please also remind her that doing the things we discussed at her visit can help her symptoms, too, and may prevent her from getting frequent UTIs: ventura using the vaginal estrogen cream. I would still advise that she go to physical therapy, as well.    Thanks!   tablet (850 mg total) by mouth daily with breakfast. Disp: 90 tablet Rfl: 1   Prochlorperazine Maleate (COMPAZINE) 10 mg tablet Take 1 tablet (10 mg total) by mouth every 6 (six) hours as needed for Nausea.  Disp: 60 tablet Rfl: 3   ibuprofen 200 MG Oral Ta Types: Cigarettes        Quit date: 2018        Years since quittin.4      Smokeless tobacco: Never Used      Tobacco comment: 8-10 cigarettes per day    Substance and Sexual Activity      Alcohol use: Yes        Comment: nothing in last 6 months

## 2019-02-01 ENCOUNTER — APPOINTMENT (OUTPATIENT)
Dept: RADIATION ONCOLOGY | Facility: HOSPITAL | Age: 69
End: 2019-02-01
Attending: RADIOLOGY
Payer: MEDICARE

## 2019-02-01 RX ORDER — ATORVASTATIN CALCIUM 20 MG/1
20 TABLET, FILM COATED ORAL NIGHTLY
Qty: 90 TABLET | Refills: 0 | Status: SHIPPED | OUTPATIENT
Start: 2019-02-01 | End: 2019-04-01

## 2019-02-05 NOTE — CONSULTS
[facility]    PATIENT'S NAME: Rikki Bourgeois   RADIATION ONCOLOGIST: Landen Malik.  Kalpana Amin MD   PATIENT ACCOUNT #: [de-identified] LOCATION: [address] [place] [address] RECORD #: [id number] YOB: 1950   CONSULTATION DATE: 01/31/2019       YUMI referred to Radiation Oncology to consider palliative treatment. The patient currently reports his pain level as 8/10. The Lottie Parrisher does help with this and drops it down to about a 4. He has had no constipation at this time.   He does have some occasional co nontender, and nondistended with normoactive bowel sounds and no hepatosplenomegaly. EXTREMITIES:  Without clubbing, cyanosis, edema. IMPRESSION:  This is a 63-year-old male with a history of metastatic renal cell carcinoma.   He has had large and destr will schedule the patient for simulation soon with the intent to begin his palliative treatment shortly afterward. Thank you very much for allowing me the opportunity to participate in the care of this patient.   Should be any questions regarding the rad

## 2019-02-07 ENCOUNTER — APPOINTMENT (OUTPATIENT)
Dept: RADIATION ONCOLOGY | Facility: HOSPITAL | Age: 69
End: 2019-02-07
Attending: RADIOLOGY
Payer: MEDICARE

## 2019-02-07 PROCEDURE — 77290 THER RAD SIMULAJ FIELD CPLX: CPT | Performed by: RADIOLOGY

## 2019-02-07 PROCEDURE — 77332 RADIATION TREATMENT AID(S): CPT | Performed by: RADIOLOGY

## 2019-02-07 PROCEDURE — 77334 RADIATION TREATMENT AID(S): CPT | Performed by: RADIOLOGY

## 2019-02-08 PROCEDURE — 77300 RADIATION THERAPY DOSE PLAN: CPT | Performed by: RADIOLOGY

## 2019-02-08 PROCEDURE — 77334 RADIATION TREATMENT AID(S): CPT | Performed by: RADIOLOGY

## 2019-02-08 PROCEDURE — 77295 3-D RADIOTHERAPY PLAN: CPT | Performed by: RADIOLOGY

## 2019-02-12 ENCOUNTER — OFFICE VISIT (OUTPATIENT)
Dept: HEMATOLOGY/ONCOLOGY | Facility: HOSPITAL | Age: 69
End: 2019-02-12
Attending: INTERNAL MEDICINE
Payer: MEDICARE

## 2019-02-12 VITALS
WEIGHT: 157.88 LBS | BODY MASS INDEX: 25.37 KG/M2 | RESPIRATION RATE: 18 BRPM | DIASTOLIC BLOOD PRESSURE: 62 MMHG | TEMPERATURE: 99 F | HEART RATE: 98 BPM | HEIGHT: 66 IN | SYSTOLIC BLOOD PRESSURE: 114 MMHG

## 2019-02-12 DIAGNOSIS — C64.9 METASTATIC RENAL CELL CARCINOMA, UNSPECIFIED LATERALITY (HCC): Primary | ICD-10-CM

## 2019-02-12 DIAGNOSIS — Z51.11 CHEMOTHERAPY MANAGEMENT, ENCOUNTER FOR: ICD-10-CM

## 2019-02-12 DIAGNOSIS — D64.9 ANEMIA, UNSPECIFIED TYPE: ICD-10-CM

## 2019-02-12 DIAGNOSIS — C78.00 MALIGNANT NEOPLASM METASTATIC TO LUNG, UNSPECIFIED LATERALITY (HCC): ICD-10-CM

## 2019-02-12 DIAGNOSIS — G89.3 CANCER RELATED PAIN: ICD-10-CM

## 2019-02-12 LAB
ALBUMIN SERPL-MCNC: 3.5 G/DL (ref 3.4–5)
ALBUMIN/GLOB SERPL: 0.7 {RATIO} (ref 1–2)
ALP LIVER SERPL-CCNC: 90 U/L (ref 45–117)
ALT SERPL-CCNC: 12 U/L (ref 16–61)
ANION GAP SERPL CALC-SCNC: 15 MMOL/L (ref 0–18)
AST SERPL-CCNC: 11 U/L (ref 15–37)
BASOPHILS # BLD AUTO: 0.03 X10(3) UL (ref 0–0.2)
BASOPHILS NFR BLD AUTO: 0.8 %
BILIRUB SERPL-MCNC: 0.4 MG/DL (ref 0.1–2)
BUN BLD-MCNC: 18 MG/DL (ref 7–18)
BUN/CREAT SERPL: 18.4 (ref 10–20)
CALCIUM BLD-MCNC: 8.5 MG/DL (ref 8.5–10.1)
CHLORIDE SERPL-SCNC: 96 MMOL/L (ref 98–107)
CO2 SERPL-SCNC: 24 MMOL/L (ref 21–32)
CREAT BLD-MCNC: 0.98 MG/DL (ref 0.7–1.3)
DEPRECATED RDW RBC AUTO: 50.1 FL (ref 35.1–46.3)
EOSINOPHIL # BLD AUTO: 0.08 X10(3) UL (ref 0–0.7)
EOSINOPHIL NFR BLD AUTO: 2.2 %
ERYTHROCYTE [DISTWIDTH] IN BLOOD BY AUTOMATED COUNT: 15.8 % (ref 11–15)
GLOBULIN PLAS-MCNC: 5.2 G/DL (ref 2.8–4.4)
GLUCOSE BLD-MCNC: 129 MG/DL (ref 70–99)
HCT VFR BLD AUTO: 34.7 % (ref 39–53)
HGB BLD-MCNC: 10.8 G/DL (ref 13–17.5)
IMM GRANULOCYTES # BLD AUTO: 0.01 X10(3) UL (ref 0–1)
IMM GRANULOCYTES NFR BLD: 0.3 %
LYMPHOCYTES # BLD AUTO: 0.81 X10(3) UL (ref 1–4)
LYMPHOCYTES NFR BLD AUTO: 22.8 %
M PROTEIN MFR SERPL ELPH: 8.7 G/DL (ref 6.4–8.2)
MCH RBC QN AUTO: 27.1 PG (ref 26–34)
MCHC RBC AUTO-ENTMCNC: 31.1 G/DL (ref 31–37)
MCV RBC AUTO: 87 FL (ref 80–100)
MONOCYTES # BLD AUTO: 0.38 X10(3) UL (ref 0.1–1)
MONOCYTES NFR BLD AUTO: 10.7 %
NEUTROPHILS # BLD AUTO: 2.25 X10 (3) UL (ref 1.5–7.7)
NEUTROPHILS # BLD AUTO: 2.25 X10(3) UL (ref 1.5–7.7)
NEUTROPHILS NFR BLD AUTO: 63.2 %
OSMOLALITY SERPL CALC.SUM OF ELEC: 284 MOSM/KG (ref 275–295)
PLATELET # BLD AUTO: 205 10(3)UL (ref 150–450)
POTASSIUM SERPL-SCNC: 4.1 MMOL/L (ref 3.5–5.1)
RBC # BLD AUTO: 3.99 X10(6)UL (ref 3.8–5.8)
SODIUM SERPL-SCNC: 135 MMOL/L (ref 136–145)
TSI SER-ACNC: 1.07 MIU/ML (ref 0.36–3.74)
WBC # BLD AUTO: 3.6 X10(3) UL (ref 4–11)

## 2019-02-12 PROCEDURE — 80053 COMPREHEN METABOLIC PANEL: CPT

## 2019-02-12 PROCEDURE — 85025 COMPLETE CBC W/AUTO DIFF WBC: CPT

## 2019-02-12 PROCEDURE — 96413 CHEMO IV INFUSION 1 HR: CPT

## 2019-02-12 PROCEDURE — 99215 OFFICE O/P EST HI 40 MIN: CPT | Performed by: INTERNAL MEDICINE

## 2019-02-12 PROCEDURE — 84443 ASSAY THYROID STIM HORMONE: CPT

## 2019-02-12 RX ORDER — 0.9 % SODIUM CHLORIDE 0.9 %
VIAL (ML) INJECTION
Status: DISPENSED
Start: 2019-02-12 | End: 2019-02-13

## 2019-02-12 RX ORDER — HYDROCODONE BITARTRATE AND ACETAMINOPHEN 5; 325 MG/1; MG/1
1-2 TABLET ORAL EVERY 4 HOURS PRN
Qty: 90 TABLET | Refills: 0 | Status: SHIPPED | OUTPATIENT
Start: 2019-02-12 | End: 2019-02-26

## 2019-02-12 RX ORDER — SODIUM CHLORIDE 9 MG/ML
INJECTION, SOLUTION INTRAVENOUS
Status: DISCONTINUED
Start: 2019-02-12 | End: 2019-02-12

## 2019-02-12 RX ORDER — SODIUM CHLORIDE 9 MG/ML
INJECTION, SOLUTION INTRAVENOUS
Status: DISPENSED
Start: 2019-02-12 | End: 2019-02-13

## 2019-02-12 RX ORDER — 0.9 % SODIUM CHLORIDE 0.9 %
VIAL (ML) INJECTION
Status: DISCONTINUED
Start: 2019-02-12 | End: 2019-02-12

## 2019-02-12 NOTE — PROGRESS NOTES
Cancer Center Progress Note    Patient Name: Xochitl Guzman   YOB: 1950   Medical Record Number: [id number]   Attending Physician: Mason Peña M.D.        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    H inability: Not on file      Transportation needs - medical: Not on file      Transportation needs - non-medical: Not on file    Occupational History      Occupation:         Comment: retired    Tobacco Use      Smoking status: Former Smoker        Y tablet, Rfl: 1  •  nicotine 14 MG/24HR Transdermal Patch 24 Hr, Apply 1 patch (21mg) daily for 2 weeks, then apply 1 patch (14mg) daily for 2 weeks, then apply 1 patch (7mg) daily for 2 weeks, Disp: 14 patch, Rfl: 0  •  Prochlorperazine Maleate (COMPAZINE) 0.4 02/12/2019    TP 8.7 (H) 02/12/2019    ALB 3.5 02/12/2019    GLOBULIN 5.2 (H) 02/12/2019    AGRATIO 1.4 09/24/2016     (L) 02/12/2019    K 4.1 02/12/2019    CL 96 (L) 02/12/2019    CO2 24.0 02/12/2019       Radiology:  CT chest personally reviewe

## 2019-02-12 NOTE — PROGRESS NOTES
Pt here for [address]. Arrives Ambulating independently, accompanied by Spouse and Family member     Patient is primarily [address] speaking- refused  prefers patient family member to translate-  All questions answered.   Modifications in dose or

## 2019-02-19 ENCOUNTER — TELEPHONE (OUTPATIENT)
Dept: HEMATOLOGY/ONCOLOGY | Facility: HOSPITAL | Age: 69
End: 2019-02-19

## 2019-02-19 NOTE — TELEPHONE ENCOUNTER
The patients daughter called to see if there had been any update on the status of the radio therapy referral.

## 2019-02-25 ENCOUNTER — APPOINTMENT (OUTPATIENT)
Dept: HEMATOLOGY/ONCOLOGY | Facility: HOSPITAL | Age: 69
End: 2019-02-25
Attending: INTERNAL MEDICINE
Payer: MEDICARE

## 2019-02-25 ENCOUNTER — TELEPHONE (OUTPATIENT)
Dept: HEMATOLOGY/ONCOLOGY | Facility: HOSPITAL | Age: 69
End: 2019-02-25

## 2019-02-25 ENCOUNTER — APPOINTMENT (OUTPATIENT)
Dept: RADIATION ONCOLOGY | Facility: HOSPITAL | Age: 69
End: 2019-02-25
Attending: RADIOLOGY
Payer: MEDICARE

## 2019-02-25 ENCOUNTER — LAB ENCOUNTER (OUTPATIENT)
Dept: LAB | Age: 69
End: 2019-02-25
Attending: INTERNAL MEDICINE
Payer: MEDICARE

## 2019-02-25 DIAGNOSIS — C64.9 METASTATIC RENAL CELL CARCINOMA, UNSPECIFIED LATERALITY (HCC): ICD-10-CM

## 2019-02-25 LAB
ALBUMIN SERPL-MCNC: 3.4 G/DL (ref 3.4–5)
ALBUMIN/GLOB SERPL: 0.7 {RATIO} (ref 1–2)
ALP LIVER SERPL-CCNC: 78 U/L (ref 45–117)
ALT SERPL-CCNC: 12 U/L (ref 16–61)
ANION GAP SERPL CALC-SCNC: 7 MMOL/L (ref 0–18)
AST SERPL-CCNC: 10 U/L (ref 15–37)
BASOPHILS # BLD AUTO: 0.04 X10(3) UL (ref 0–0.2)
BASOPHILS NFR BLD AUTO: 1 %
BILIRUB SERPL-MCNC: 0.4 MG/DL (ref 0.1–2)
BUN BLD-MCNC: 13 MG/DL (ref 7–18)
BUN/CREAT SERPL: 14.4 (ref 10–20)
CALCIUM BLD-MCNC: 8.4 MG/DL (ref 8.5–10.1)
CHLORIDE SERPL-SCNC: 101 MMOL/L (ref 98–107)
CO2 SERPL-SCNC: 25 MMOL/L (ref 21–32)
CREAT BLD-MCNC: 0.9 MG/DL (ref 0.7–1.3)
DEPRECATED RDW RBC AUTO: 49.3 FL (ref 35.1–46.3)
EOSINOPHIL # BLD AUTO: 0.07 X10(3) UL (ref 0–0.7)
EOSINOPHIL NFR BLD AUTO: 1.8 %
ERYTHROCYTE [DISTWIDTH] IN BLOOD BY AUTOMATED COUNT: 16 % (ref 11–15)
GLOBULIN PLAS-MCNC: 4.8 G/DL (ref 2.8–4.4)
GLUCOSE BLD-MCNC: 173 MG/DL (ref 70–99)
HCT VFR BLD AUTO: 31.4 % (ref 39–53)
HGB BLD-MCNC: 10.1 G/DL (ref 13–17.5)
IMM GRANULOCYTES # BLD AUTO: 0.01 X10(3) UL (ref 0–1)
IMM GRANULOCYTES NFR BLD: 0.3 %
LYMPHOCYTES # BLD AUTO: 0.73 X10(3) UL (ref 1–4)
LYMPHOCYTES NFR BLD AUTO: 19 %
M PROTEIN MFR SERPL ELPH: 8.2 G/DL (ref 6.4–8.2)
MCH RBC QN AUTO: 27.2 PG (ref 26–34)
MCHC RBC AUTO-ENTMCNC: 32.2 G/DL (ref 31–37)
MCV RBC AUTO: 84.4 FL (ref 80–100)
MONOCYTES # BLD AUTO: 0.41 X10(3) UL (ref 0.1–1)
MONOCYTES NFR BLD AUTO: 10.6 %
NEUTROPHILS # BLD AUTO: 2.59 X10 (3) UL (ref 1.5–7.7)
NEUTROPHILS # BLD AUTO: 2.59 X10(3) UL (ref 1.5–7.7)
NEUTROPHILS NFR BLD AUTO: 67.3 %
OSMOLALITY SERPL CALC.SUM OF ELEC: 280 MOSM/KG (ref 275–295)
PLATELET # BLD AUTO: 188 10(3)UL (ref 150–450)
POTASSIUM SERPL-SCNC: 4.4 MMOL/L (ref 3.5–5.1)
RBC # BLD AUTO: 3.72 X10(6)UL (ref 3.8–5.8)
SODIUM SERPL-SCNC: 133 MMOL/L (ref 136–145)
WBC # BLD AUTO: 3.9 X10(3) UL (ref 4–11)

## 2019-02-25 PROCEDURE — 80053 COMPREHEN METABOLIC PANEL: CPT

## 2019-02-25 PROCEDURE — 77280 THER RAD SIMULAJ FIELD SMPL: CPT | Performed by: RADIOLOGY

## 2019-02-25 PROCEDURE — 36415 COLL VENOUS BLD VENIPUNCTURE: CPT

## 2019-02-25 PROCEDURE — 85025 COMPLETE CBC W/AUTO DIFF WBC: CPT

## 2019-02-25 PROCEDURE — 77412 RADIATION TX DELIVERY LVL 3: CPT | Performed by: RADIOLOGY

## 2019-02-25 RX ORDER — LORAZEPAM 2 MG/1
TABLET ORAL
Qty: 45 TABLET | Refills: 0 | Status: SHIPPED
Start: 2019-02-25 | End: 2019-03-28

## 2019-02-25 NOTE — TELEPHONE ENCOUNTER
Daughter Elijah Madrid calling wanting lab order placed prior to chemo tomorrow.  Wants to go to [place] lab. mahad

## 2019-02-26 ENCOUNTER — OFFICE VISIT (OUTPATIENT)
Dept: RADIATION ONCOLOGY | Facility: HOSPITAL | Age: 69
End: 2019-02-26
Attending: RADIOLOGY
Payer: MEDICARE

## 2019-02-26 ENCOUNTER — OFFICE VISIT (OUTPATIENT)
Dept: HEMATOLOGY/ONCOLOGY | Facility: HOSPITAL | Age: 69
End: 2019-02-26
Attending: INTERNAL MEDICINE
Payer: MEDICARE

## 2019-02-26 ENCOUNTER — OFFICE VISIT (OUTPATIENT)
Dept: HEMATOLOGY/ONCOLOGY | Facility: HOSPITAL | Age: 69
End: 2019-02-26
Attending: NURSE PRACTITIONER
Payer: MEDICARE

## 2019-02-26 VITALS
WEIGHT: 158 LBS | BODY MASS INDEX: 25.39 KG/M2 | DIASTOLIC BLOOD PRESSURE: 57 MMHG | HEART RATE: 96 BPM | SYSTOLIC BLOOD PRESSURE: 97 MMHG | TEMPERATURE: 98 F | HEIGHT: 66 IN

## 2019-02-26 VITALS
HEIGHT: 66 IN | RESPIRATION RATE: 18 BRPM | WEIGHT: 158.31 LBS | TEMPERATURE: 98 F | BODY MASS INDEX: 25.44 KG/M2 | DIASTOLIC BLOOD PRESSURE: 57 MMHG | SYSTOLIC BLOOD PRESSURE: 97 MMHG | HEART RATE: 96 BPM

## 2019-02-26 VITALS
RESPIRATION RATE: 18 BRPM | HEART RATE: 95 BPM | SYSTOLIC BLOOD PRESSURE: 125 MMHG | TEMPERATURE: 98 F | DIASTOLIC BLOOD PRESSURE: 64 MMHG

## 2019-02-26 VITALS
RESPIRATION RATE: 18 BRPM | TEMPERATURE: 98 F | SYSTOLIC BLOOD PRESSURE: 97 MMHG | HEART RATE: 96 BPM | DIASTOLIC BLOOD PRESSURE: 57 MMHG

## 2019-02-26 DIAGNOSIS — C64.9 METASTATIC RENAL CELL CARCINOMA, UNSPECIFIED LATERALITY (HCC): ICD-10-CM

## 2019-02-26 DIAGNOSIS — C64.9 METASTATIC RENAL CELL CARCINOMA, UNSPECIFIED LATERALITY (HCC): Primary | ICD-10-CM

## 2019-02-26 DIAGNOSIS — G89.3 CANCER RELATED PAIN: ICD-10-CM

## 2019-02-26 DIAGNOSIS — T40.2X5A THERAPEUTIC OPIOID INDUCED CONSTIPATION: ICD-10-CM

## 2019-02-26 DIAGNOSIS — C78.00 MALIGNANT NEOPLASM METASTATIC TO LUNG, UNSPECIFIED LATERALITY (HCC): ICD-10-CM

## 2019-02-26 DIAGNOSIS — C79.51 SECONDARY CANCER OF BONE (HCC): Primary | ICD-10-CM

## 2019-02-26 DIAGNOSIS — G89.3 CANCER RELATED PAIN: Primary | ICD-10-CM

## 2019-02-26 DIAGNOSIS — K59.03 THERAPEUTIC OPIOID INDUCED CONSTIPATION: ICD-10-CM

## 2019-02-26 DIAGNOSIS — F41.9 ANXIETY: ICD-10-CM

## 2019-02-26 DIAGNOSIS — Z51.11 CHEMOTHERAPY MANAGEMENT, ENCOUNTER FOR: ICD-10-CM

## 2019-02-26 PROCEDURE — 77387 GUIDANCE FOR RADJ TX DLVR: CPT | Performed by: RADIOLOGY

## 2019-02-26 PROCEDURE — 99215 OFFICE O/P EST HI 40 MIN: CPT | Performed by: INTERNAL MEDICINE

## 2019-02-26 PROCEDURE — 77412 RADIATION TX DELIVERY LVL 3: CPT | Performed by: RADIOLOGY

## 2019-02-26 PROCEDURE — 96413 CHEMO IV INFUSION 1 HR: CPT

## 2019-02-26 PROCEDURE — 99202 OFFICE O/P NEW SF 15 MIN: CPT | Performed by: NURSE PRACTITIONER

## 2019-02-26 RX ORDER — HYDROCODONE BITARTRATE AND ACETAMINOPHEN 7.5; 325 MG/1; MG/1
1 TABLET ORAL EVERY 4 HOURS PRN
Qty: 180 TABLET | Refills: 0 | Status: SHIPPED | OUTPATIENT
Start: 2019-02-26 | End: 2019-03-26

## 2019-02-26 RX ORDER — SODIUM CHLORIDE 9 MG/ML
INJECTION, SOLUTION INTRAVENOUS
Status: DISCONTINUED
Start: 2019-02-26 | End: 2019-02-26

## 2019-02-26 RX ORDER — 0.9 % SODIUM CHLORIDE 0.9 %
VIAL (ML) INJECTION
Status: DISCONTINUED
Start: 2019-02-26 | End: 2019-02-26

## 2019-02-26 NOTE — TELEPHONE ENCOUNTER
Per printed script from Dr. Ray Johnson [facility] [employer] [facility] Lorazepam 2 mg tab, qty # 45 with no additional refill. Take 1/2 tab by mouth every morning and 1 tab by mouth every evening at bedtime.

## 2019-02-26 NOTE — PROGRESS NOTES
[facility] Radiation Treatment Management Note 1-5    Patient:  Rosaura Barton  Age:  71year old  Visit Diagnosis:    1.  Secondary cancer of bone [facility])      Primary Rad/Onc:  Dr. Simin Garland    Site Delivered Dose (Gy) Prescrib

## 2019-02-26 NOTE — TELEPHONE ENCOUNTER
No Protocol on this med. Controlled medication pending for review. If approved needs to be called in or faxed by on-site staff.       Requested Prescriptions     Pending Prescriptions Disp Refills   • LORazepam 2 MG Oral Tab 45 tablet 0     Sig: TAKE 1

## 2019-02-26 NOTE — PROGRESS NOTES
[facility] to infusion today for C10 D1 Opdivo. Arrives Ambulating independently, accompanied by wife and daughter. Patient refused , mostly Belarusian speaking, prefers for daughter to translate.  He has pain to upper right chest and back, since diag Outcome:  Stated Understanding and Needs reinforcement  Comments:

## 2019-02-26 NOTE — PATIENT INSTRUCTIONS
-Prescription provided for Hydrocodone 7.5/325 take every 4 hours as needed for pain    -Do not exceed 4,000mg Tylenol/Acetaminophen per day    -For opioid induced constipation, take 1-2 tables Leslye-a at bedtime; CAP-C constipation algorithm provided    -P medicines are working. Common side effects of opioid medications: include constipation, sleepiness, and nausea. Sleepiness and nausea tend to improve as your body adjust to the medications.      Safety: Keep your opioids in a safe place so that people d

## 2019-02-26 NOTE — PROGRESS NOTES
Cancer Center Progress Note    Patient Name: Jennifer Dong   YOB: 1950   Medical Record Number: [id number]   Attending Physician: Sheila Delgado M.D.        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 Normal S1S2  Abdomen: Soft, non tender. No hepatosplenomegaly. No palpable mass. Extremities: No edema. Neurological: 5/5 motor x4.       Laboratory:  Recent Labs   Lab  02/25/19   0915   WBC  3.9*   HGB  10.1*   PLT  188.0   N chemotherapy encounter    Eri Nieves MD

## 2019-02-27 PROCEDURE — 77412 RADIATION TX DELIVERY LVL 3: CPT | Performed by: RADIOLOGY

## 2019-02-27 PROCEDURE — 77331 SPECIAL RADIATION DOSIMETRY: CPT | Performed by: RADIOLOGY

## 2019-02-27 PROCEDURE — 77387 GUIDANCE FOR RADJ TX DLVR: CPT | Performed by: RADIOLOGY

## 2019-02-28 ENCOUNTER — SNF/IP PROF CHARGE ONLY (OUTPATIENT)
Dept: HEMATOLOGY/ONCOLOGY | Facility: HOSPITAL | Age: 69
End: 2019-02-28

## 2019-02-28 DIAGNOSIS — C64.9 METASTATIC RENAL CELL CARCINOMA, UNSPECIFIED LATERALITY (HCC): ICD-10-CM

## 2019-02-28 PROCEDURE — 77412 RADIATION TX DELIVERY LVL 3: CPT | Performed by: RADIOLOGY

## 2019-02-28 PROCEDURE — G9678 ONCOLOGY CARE MODEL SERVICE: HCPCS | Performed by: INTERNAL MEDICINE

## 2019-02-28 PROCEDURE — 77387 GUIDANCE FOR RADJ TX DLVR: CPT | Performed by: RADIOLOGY

## 2019-03-01 ENCOUNTER — APPOINTMENT (OUTPATIENT)
Dept: RADIATION ONCOLOGY | Facility: HOSPITAL | Age: 69
End: 2019-03-01
Attending: RADIOLOGY
Payer: MEDICARE

## 2019-03-01 PROCEDURE — 77387 GUIDANCE FOR RADJ TX DLVR: CPT | Performed by: RADIOLOGY

## 2019-03-01 PROCEDURE — 77412 RADIATION TX DELIVERY LVL 3: CPT | Performed by: RADIOLOGY

## 2019-03-01 PROCEDURE — 77336 RADIATION PHYSICS CONSULT: CPT | Performed by: RADIOLOGY

## 2019-03-04 ENCOUNTER — APPOINTMENT (OUTPATIENT)
Dept: RADIATION ONCOLOGY | Facility: HOSPITAL | Age: 69
End: 2019-03-04
Attending: RADIOLOGY
Payer: MEDICARE

## 2019-03-05 ENCOUNTER — OFFICE VISIT (OUTPATIENT)
Dept: RADIATION ONCOLOGY | Facility: HOSPITAL | Age: 69
End: 2019-03-05
Attending: RADIOLOGY
Payer: MEDICARE

## 2019-03-05 VITALS
DIASTOLIC BLOOD PRESSURE: 54 MMHG | BODY MASS INDEX: 25.13 KG/M2 | HEIGHT: 66 IN | WEIGHT: 156.38 LBS | HEART RATE: 102 BPM | RESPIRATION RATE: 18 BRPM | TEMPERATURE: 98 F | SYSTOLIC BLOOD PRESSURE: 96 MMHG

## 2019-03-05 DIAGNOSIS — C79.51 SECONDARY CANCER OF BONE (HCC): Primary | ICD-10-CM

## 2019-03-05 NOTE — PROGRESS NOTES
[facility] Radiation Treatment Management Note 6-10    Patient:  Mariya Morgan  Age:  71year old  Visit Diagnosis:    1.  Secondary cancer of bone [facility])      Primary Rad/Onc:  Dr. Pj Saunders    Site Delivered Dose (Gy) Prescri

## 2019-03-06 PROCEDURE — 77412 RADIATION TX DELIVERY LVL 3: CPT | Performed by: RADIOLOGY

## 2019-03-06 PROCEDURE — 77387 GUIDANCE FOR RADJ TX DLVR: CPT | Performed by: RADIOLOGY

## 2019-03-07 PROCEDURE — 77387 GUIDANCE FOR RADJ TX DLVR: CPT | Performed by: RADIOLOGY

## 2019-03-07 PROCEDURE — 77412 RADIATION TX DELIVERY LVL 3: CPT | Performed by: RADIOLOGY

## 2019-03-08 PROCEDURE — 77387 GUIDANCE FOR RADJ TX DLVR: CPT | Performed by: RADIOLOGY

## 2019-03-08 PROCEDURE — 77336 RADIATION PHYSICS CONSULT: CPT | Performed by: RADIOLOGY

## 2019-03-08 PROCEDURE — 77412 RADIATION TX DELIVERY LVL 3: CPT | Performed by: RADIOLOGY

## 2019-03-11 ENCOUNTER — APPOINTMENT (OUTPATIENT)
Dept: RADIATION ONCOLOGY | Facility: HOSPITAL | Age: 69
End: 2019-03-11
Payer: MEDICARE

## 2019-03-11 ENCOUNTER — APPOINTMENT (OUTPATIENT)
Dept: HEMATOLOGY/ONCOLOGY | Facility: HOSPITAL | Age: 69
End: 2019-03-11
Attending: INTERNAL MEDICINE
Payer: MEDICARE

## 2019-03-11 ENCOUNTER — LAB ENCOUNTER (OUTPATIENT)
Dept: LAB | Age: 69
End: 2019-03-11
Attending: INTERNAL MEDICINE
Payer: MEDICARE

## 2019-03-11 DIAGNOSIS — C78.00 MALIGNANT NEOPLASM METASTATIC TO LUNG, UNSPECIFIED LATERALITY (HCC): ICD-10-CM

## 2019-03-11 DIAGNOSIS — C64.9 METASTATIC RENAL CELL CARCINOMA, UNSPECIFIED LATERALITY (HCC): ICD-10-CM

## 2019-03-11 LAB
ALBUMIN SERPL-MCNC: 3.5 G/DL (ref 3.4–5)
ALBUMIN/GLOB SERPL: 0.7 {RATIO} (ref 1–2)
ALP LIVER SERPL-CCNC: 79 U/L (ref 45–117)
ALT SERPL-CCNC: 11 U/L (ref 16–61)
ANION GAP SERPL CALC-SCNC: 3 MMOL/L (ref 0–18)
AST SERPL-CCNC: 7 U/L (ref 15–37)
BASOPHILS # BLD AUTO: 0.02 X10(3) UL (ref 0–0.2)
BASOPHILS NFR BLD AUTO: 0.7 %
BILIRUB SERPL-MCNC: 0.5 MG/DL (ref 0.1–2)
BUN BLD-MCNC: 13 MG/DL (ref 7–18)
BUN/CREAT SERPL: 15.5 (ref 10–20)
CALCIUM BLD-MCNC: 8.5 MG/DL (ref 8.5–10.1)
CHLORIDE SERPL-SCNC: 98 MMOL/L (ref 98–107)
CO2 SERPL-SCNC: 28 MMOL/L (ref 21–32)
CREAT BLD-MCNC: 0.84 MG/DL (ref 0.7–1.3)
DEPRECATED RDW RBC AUTO: 49.3 FL (ref 35.1–46.3)
EOSINOPHIL # BLD AUTO: 0.06 X10(3) UL (ref 0–0.7)
EOSINOPHIL NFR BLD AUTO: 2.1 %
ERYTHROCYTE [DISTWIDTH] IN BLOOD BY AUTOMATED COUNT: 15.9 % (ref 11–15)
GLOBULIN PLAS-MCNC: 4.7 G/DL (ref 2.8–4.4)
GLUCOSE BLD-MCNC: 171 MG/DL (ref 70–99)
HCT VFR BLD AUTO: 32.9 % (ref 39–53)
HGB BLD-MCNC: 10.5 G/DL (ref 13–17.5)
IMM GRANULOCYTES # BLD AUTO: 0.02 X10(3) UL (ref 0–1)
IMM GRANULOCYTES NFR BLD: 0.7 %
LYMPHOCYTES # BLD AUTO: 0.36 X10(3) UL (ref 1–4)
LYMPHOCYTES NFR BLD AUTO: 12.9 %
M PROTEIN MFR SERPL ELPH: 8.2 G/DL (ref 6.4–8.2)
MCH RBC QN AUTO: 26.7 PG (ref 26–34)
MCHC RBC AUTO-ENTMCNC: 31.9 G/DL (ref 31–37)
MCV RBC AUTO: 83.7 FL (ref 80–100)
MONOCYTES # BLD AUTO: 0.41 X10(3) UL (ref 0.1–1)
MONOCYTES NFR BLD AUTO: 14.6 %
NEUTROPHILS # BLD AUTO: 1.93 X10 (3) UL (ref 1.5–7.7)
NEUTROPHILS # BLD AUTO: 1.93 X10(3) UL (ref 1.5–7.7)
NEUTROPHILS NFR BLD AUTO: 69 %
OSMOLALITY SERPL CALC.SUM OF ELEC: 272 MOSM/KG (ref 275–295)
PLATELET # BLD AUTO: 177 10(3)UL (ref 150–450)
POTASSIUM SERPL-SCNC: 4.7 MMOL/L (ref 3.5–5.1)
RBC # BLD AUTO: 3.93 X10(6)UL (ref 3.8–5.8)
SODIUM SERPL-SCNC: 129 MMOL/L (ref 136–145)
TSI SER-ACNC: 1.04 MIU/ML (ref 0.36–3.74)
WBC # BLD AUTO: 2.8 X10(3) UL (ref 4–11)

## 2019-03-11 PROCEDURE — 77412 RADIATION TX DELIVERY LVL 3: CPT | Performed by: RADIOLOGY

## 2019-03-11 PROCEDURE — 84443 ASSAY THYROID STIM HORMONE: CPT

## 2019-03-11 PROCEDURE — 85025 COMPLETE CBC W/AUTO DIFF WBC: CPT

## 2019-03-11 PROCEDURE — 77387 GUIDANCE FOR RADJ TX DLVR: CPT | Performed by: RADIOLOGY

## 2019-03-11 PROCEDURE — 80053 COMPREHEN METABOLIC PANEL: CPT

## 2019-03-11 PROCEDURE — 36415 COLL VENOUS BLD VENIPUNCTURE: CPT

## 2019-03-12 ENCOUNTER — OFFICE VISIT (OUTPATIENT)
Dept: HEMATOLOGY/ONCOLOGY | Facility: HOSPITAL | Age: 69
End: 2019-03-12
Attending: INTERNAL MEDICINE
Payer: MEDICARE

## 2019-03-12 ENCOUNTER — OFFICE VISIT (OUTPATIENT)
Dept: RADIATION ONCOLOGY | Facility: HOSPITAL | Age: 69
End: 2019-03-12
Attending: RADIOLOGY
Payer: MEDICARE

## 2019-03-12 VITALS
SYSTOLIC BLOOD PRESSURE: 122 MMHG | RESPIRATION RATE: 18 BRPM | DIASTOLIC BLOOD PRESSURE: 69 MMHG | TEMPERATURE: 98 F | HEART RATE: 98 BPM | OXYGEN SATURATION: 98 %

## 2019-03-12 VITALS
DIASTOLIC BLOOD PRESSURE: 58 MMHG | HEIGHT: 66 IN | RESPIRATION RATE: 18 BRPM | WEIGHT: 156.31 LBS | BODY MASS INDEX: 25.12 KG/M2 | HEART RATE: 101 BPM | TEMPERATURE: 98 F | SYSTOLIC BLOOD PRESSURE: 111 MMHG

## 2019-03-12 VITALS
HEART RATE: 101 BPM | DIASTOLIC BLOOD PRESSURE: 58 MMHG | WEIGHT: 156 LBS | HEIGHT: 66 IN | RESPIRATION RATE: 18 BRPM | TEMPERATURE: 98 F | SYSTOLIC BLOOD PRESSURE: 111 MMHG | BODY MASS INDEX: 25.07 KG/M2

## 2019-03-12 DIAGNOSIS — C79.51 SECONDARY CANCER OF BONE (HCC): Primary | ICD-10-CM

## 2019-03-12 DIAGNOSIS — E87.1 HYPONATREMIA: ICD-10-CM

## 2019-03-12 DIAGNOSIS — C78.00 MALIGNANT NEOPLASM METASTATIC TO LUNG, UNSPECIFIED LATERALITY (HCC): ICD-10-CM

## 2019-03-12 DIAGNOSIS — C64.9 METASTATIC RENAL CELL CARCINOMA, UNSPECIFIED LATERALITY (HCC): Primary | ICD-10-CM

## 2019-03-12 DIAGNOSIS — Z51.11 CHEMOTHERAPY MANAGEMENT, ENCOUNTER FOR: ICD-10-CM

## 2019-03-12 DIAGNOSIS — D64.9 ANEMIA, UNSPECIFIED TYPE: ICD-10-CM

## 2019-03-12 PROCEDURE — 96361 HYDRATE IV INFUSION ADD-ON: CPT

## 2019-03-12 PROCEDURE — 96366 THER/PROPH/DIAG IV INF ADDON: CPT

## 2019-03-12 PROCEDURE — 77412 RADIATION TX DELIVERY LVL 3: CPT | Performed by: RADIOLOGY

## 2019-03-12 PROCEDURE — 77387 GUIDANCE FOR RADJ TX DLVR: CPT | Performed by: RADIOLOGY

## 2019-03-12 PROCEDURE — 99215 OFFICE O/P EST HI 40 MIN: CPT | Performed by: INTERNAL MEDICINE

## 2019-03-12 PROCEDURE — 96413 CHEMO IV INFUSION 1 HR: CPT

## 2019-03-12 RX ORDER — SODIUM CHLORIDE 9 MG/ML
INJECTION, SOLUTION INTRAVENOUS
Status: COMPLETED
Start: 2019-03-12 | End: 2019-03-12

## 2019-03-12 RX ORDER — 0.9 % SODIUM CHLORIDE 0.9 %
VIAL (ML) INJECTION
Status: DISCONTINUED
Start: 2019-03-12 | End: 2019-03-12

## 2019-03-12 RX ADMIN — SODIUM CHLORIDE 500 ML: 9 INJECTION, SOLUTION INTRAVENOUS at 15:11:00

## 2019-03-12 NOTE — PATIENT INSTRUCTIONS
Call [phone] to schedule a 4 week follow up appointment to see Dr. Susanne Chavez. Call Eron Dickens RN at [phone] with any radiation issues.

## 2019-03-12 NOTE — PROGRESS NOTES
Cancer Center Progress Note    Patient Name: Miya Klein   YOB: 1950   Medical Record Number: [id number]   Attending Physician: Oj Fernandez M.D.        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    H Not on file      Food insecurity:        Worry: Not on file        Inability: Not on file      Transportation needs:        Medical: Not on file        Non-medical: Not on file    Tobacco Use      Smoking status: Former Smoker        Years: 50.00        Ty Pain., Disp: 180 tablet, Rfl: 0  •  LORazepam 2 MG Oral Tab, TAKE 1/2 TABLET BY MOUTH EVERY MORNING AND 1 TABLET BY MOUTH EVERY NIGHT AT BEDTIME, Disp: 45 tablet, Rfl: 0  •  atorvastatin 20 MG Oral Tab, Take 1 tablet (20 mg total) by mouth nightly., Disp: 03/11/2019    ALB 3.5 03/11/2019    GLOBULIN 4.7 (H) 03/11/2019    AGRATIO 1.4 09/24/2016     (L) 03/11/2019    K 4.7 03/11/2019    CL 98 03/11/2019    CO2 28.0 03/11/2019       Radiology:  CT chest personally reviewed overall pulmonary metastasis ha

## 2019-03-12 NOTE — PROGRESS NOTES
[facility] Radiation Treatment Management Note 6-10    Patient:  Sydnie Garcia  Age:  71year old  Visit Diagnosis:    1.  Secondary cancer of bone [facility])      Primary Rad/Onc:  Dr. Pat Meek    Site Delivered Dose (Gy) Prescri

## 2019-03-12 NOTE — PROGRESS NOTES
Pt here for C10D1 Opdivo.   Arrives Ambulating independently, accompanied by Spouse,   Patient is primarily [address] speaking- callled for  Mau Acosta ([phone]) Discussed with patient and wife that Dr. Veronica Malagon had ordered 500ccNs hydration due to patients l

## 2019-03-15 PROCEDURE — 77336 RADIATION PHYSICS CONSULT: CPT | Performed by: RADIOLOGY

## 2019-03-15 NOTE — PROGRESS NOTES
[facility]    PATIENT'S NAME: Celso Candelario   RADIATION ONCOLOGIST: Mario England.  Kishore Liu MD   PATIENT ACCOUNT #: [de-identified] LOCATION: [address] [place] [address] RECORD #: [id number] YOB: 1950   DATE: 03/12/2019       RADIATION ONCOL in an attempt to spare as much surrounding normal lung as was possible given that the field itself was rather large.     TOLERANCE:  The patient tolerated his treatment well and did not experience much in the way of side effects or difficulties with treatme

## 2019-03-25 ENCOUNTER — LAB ENCOUNTER (OUTPATIENT)
Dept: LAB | Age: 69
End: 2019-03-25
Attending: INTERNAL MEDICINE
Payer: MEDICARE

## 2019-03-25 DIAGNOSIS — C64.9 METASTATIC RENAL CELL CARCINOMA, UNSPECIFIED LATERALITY (HCC): ICD-10-CM

## 2019-03-25 DIAGNOSIS — C78.00 MALIGNANT NEOPLASM METASTATIC TO LUNG, UNSPECIFIED LATERALITY (HCC): ICD-10-CM

## 2019-03-25 LAB
ALBUMIN SERPL-MCNC: 3.4 G/DL (ref 3.4–5)
ALBUMIN/GLOB SERPL: 0.7 {RATIO} (ref 1–2)
ALP LIVER SERPL-CCNC: 79 U/L (ref 45–117)
ALT SERPL-CCNC: 12 U/L (ref 16–61)
ANION GAP SERPL CALC-SCNC: 6 MMOL/L (ref 0–18)
AST SERPL-CCNC: 7 U/L (ref 15–37)
BASOPHILS # BLD AUTO: 0.04 X10(3) UL (ref 0–0.2)
BASOPHILS NFR BLD AUTO: 1.4 %
BILIRUB SERPL-MCNC: 0.5 MG/DL (ref 0.1–2)
BUN BLD-MCNC: 14 MG/DL (ref 7–18)
BUN/CREAT SERPL: 17.9 (ref 10–20)
CALCIUM BLD-MCNC: 8.7 MG/DL (ref 8.5–10.1)
CHLORIDE SERPL-SCNC: 99 MMOL/L (ref 98–107)
CO2 SERPL-SCNC: 26 MMOL/L (ref 21–32)
CREAT BLD-MCNC: 0.78 MG/DL (ref 0.7–1.3)
DEPRECATED RDW RBC AUTO: 49.4 FL (ref 35.1–46.3)
EOSINOPHIL # BLD AUTO: 0.07 X10(3) UL (ref 0–0.7)
EOSINOPHIL NFR BLD AUTO: 2.4 %
ERYTHROCYTE [DISTWIDTH] IN BLOOD BY AUTOMATED COUNT: 16.1 % (ref 11–15)
GLOBULIN PLAS-MCNC: 4.9 G/DL (ref 2.8–4.4)
GLUCOSE BLD-MCNC: 174 MG/DL (ref 70–99)
HCT VFR BLD AUTO: 32.9 % (ref 39–53)
HGB BLD-MCNC: 10.4 G/DL (ref 13–17.5)
IMM GRANULOCYTES # BLD AUTO: 0.02 X10(3) UL (ref 0–1)
IMM GRANULOCYTES NFR BLD: 0.7 %
LYMPHOCYTES # BLD AUTO: 0.29 X10(3) UL (ref 1–4)
LYMPHOCYTES NFR BLD AUTO: 10.1 %
M PROTEIN MFR SERPL ELPH: 8.3 G/DL (ref 6.4–8.2)
MCH RBC QN AUTO: 26.7 PG (ref 26–34)
MCHC RBC AUTO-ENTMCNC: 31.6 G/DL (ref 31–37)
MCV RBC AUTO: 84.6 FL (ref 80–100)
MONOCYTES # BLD AUTO: 0.34 X10(3) UL (ref 0.1–1)
MONOCYTES NFR BLD AUTO: 11.8 %
NEUTROPHILS # BLD AUTO: 2.11 X10 (3) UL (ref 1.5–7.7)
NEUTROPHILS # BLD AUTO: 2.11 X10(3) UL (ref 1.5–7.7)
NEUTROPHILS NFR BLD AUTO: 73.6 %
OSMOLALITY SERPL CALC.SUM OF ELEC: 277 MOSM/KG (ref 275–295)
PLATELET # BLD AUTO: 149 10(3)UL (ref 150–450)
POTASSIUM SERPL-SCNC: 4.9 MMOL/L (ref 3.5–5.1)
RBC # BLD AUTO: 3.89 X10(6)UL (ref 3.8–5.8)
SODIUM SERPL-SCNC: 131 MMOL/L (ref 136–145)
TSI SER-ACNC: 1.05 MIU/ML (ref 0.36–3.74)
WBC # BLD AUTO: 2.9 X10(3) UL (ref 4–11)

## 2019-03-25 PROCEDURE — 84443 ASSAY THYROID STIM HORMONE: CPT

## 2019-03-25 PROCEDURE — 85025 COMPLETE CBC W/AUTO DIFF WBC: CPT

## 2019-03-25 PROCEDURE — 80053 COMPREHEN METABOLIC PANEL: CPT

## 2019-03-25 PROCEDURE — 36415 COLL VENOUS BLD VENIPUNCTURE: CPT

## 2019-03-26 ENCOUNTER — OFFICE VISIT (OUTPATIENT)
Dept: HEMATOLOGY/ONCOLOGY | Facility: HOSPITAL | Age: 69
End: 2019-03-26
Attending: INTERNAL MEDICINE
Payer: MEDICARE

## 2019-03-26 VITALS
TEMPERATURE: 98 F | BODY MASS INDEX: 24.91 KG/M2 | OXYGEN SATURATION: 97 % | HEART RATE: 96 BPM | HEIGHT: 66 IN | WEIGHT: 155 LBS | SYSTOLIC BLOOD PRESSURE: 104 MMHG | RESPIRATION RATE: 16 BRPM | DIASTOLIC BLOOD PRESSURE: 59 MMHG

## 2019-03-26 VITALS
RESPIRATION RATE: 16 BRPM | DIASTOLIC BLOOD PRESSURE: 59 MMHG | OXYGEN SATURATION: 97 % | BODY MASS INDEX: 24.91 KG/M2 | HEART RATE: 96 BPM | HEIGHT: 66 IN | WEIGHT: 155 LBS | TEMPERATURE: 98 F | SYSTOLIC BLOOD PRESSURE: 104 MMHG

## 2019-03-26 VITALS
BODY MASS INDEX: 24.91 KG/M2 | WEIGHT: 155 LBS | RESPIRATION RATE: 16 BRPM | DIASTOLIC BLOOD PRESSURE: 59 MMHG | SYSTOLIC BLOOD PRESSURE: 104 MMHG | TEMPERATURE: 98 F | HEART RATE: 96 BPM | HEIGHT: 66 IN | OXYGEN SATURATION: 97 %

## 2019-03-26 DIAGNOSIS — C64.9 METASTATIC RENAL CELL CARCINOMA, UNSPECIFIED LATERALITY (HCC): ICD-10-CM

## 2019-03-26 DIAGNOSIS — C64.9 METASTATIC RENAL CELL CARCINOMA, UNSPECIFIED LATERALITY (HCC): Primary | ICD-10-CM

## 2019-03-26 DIAGNOSIS — Z51.11 CHEMOTHERAPY MANAGEMENT, ENCOUNTER FOR: ICD-10-CM

## 2019-03-26 DIAGNOSIS — F41.9 ANXIETY: ICD-10-CM

## 2019-03-26 DIAGNOSIS — L29.9 ITCHY SKIN: ICD-10-CM

## 2019-03-26 DIAGNOSIS — C78.00 MALIGNANT NEOPLASM METASTATIC TO LUNG, UNSPECIFIED LATERALITY (HCC): Primary | ICD-10-CM

## 2019-03-26 DIAGNOSIS — K59.03 THERAPEUTIC OPIOID INDUCED CONSTIPATION: ICD-10-CM

## 2019-03-26 DIAGNOSIS — D64.9 ANEMIA, UNSPECIFIED TYPE: ICD-10-CM

## 2019-03-26 DIAGNOSIS — E87.1 HYPONATREMIA: ICD-10-CM

## 2019-03-26 DIAGNOSIS — C78.00 MALIGNANT NEOPLASM METASTATIC TO LUNG, UNSPECIFIED LATERALITY (HCC): ICD-10-CM

## 2019-03-26 DIAGNOSIS — T40.2X5A THERAPEUTIC OPIOID INDUCED CONSTIPATION: ICD-10-CM

## 2019-03-26 DIAGNOSIS — G89.3 CANCER RELATED PAIN: Primary | ICD-10-CM

## 2019-03-26 PROCEDURE — 99214 OFFICE O/P EST MOD 30 MIN: CPT | Performed by: NURSE PRACTITIONER

## 2019-03-26 PROCEDURE — 99215 OFFICE O/P EST HI 40 MIN: CPT | Performed by: INTERNAL MEDICINE

## 2019-03-26 PROCEDURE — 96413 CHEMO IV INFUSION 1 HR: CPT

## 2019-03-26 RX ORDER — 0.9 % SODIUM CHLORIDE 0.9 %
VIAL (ML) INJECTION
Status: DISPENSED
Start: 2019-03-26 | End: 2019-03-27

## 2019-03-26 RX ORDER — HYDROCODONE BITARTRATE AND ACETAMINOPHEN 7.5; 325 MG/1; MG/1
1 TABLET ORAL EVERY 4 HOURS PRN
Qty: 180 TABLET | Refills: 0 | Status: SHIPPED | OUTPATIENT
Start: 2019-03-26 | End: 2019-04-17

## 2019-03-26 RX ORDER — SODIUM CHLORIDE 9 MG/ML
INJECTION, SOLUTION INTRAVENOUS
Status: DISPENSED
Start: 2019-03-26 | End: 2019-03-27

## 2019-03-26 NOTE — PROGRESS NOTES
Pt here for [address]. Arrives Ambulating independently, accompanied by Spouse and Family member       Patient is primarily [address] speaking- refused  prefers patient family member to translate-  All questions answered.   Denies concerns, had

## 2019-03-26 NOTE — PATIENT INSTRUCTIONS
-Please call Olman Simpler with any Palliative Care concerns/questions @ [phone]    -Rx for Norco 7.5/325mg provided today- take 1 tab every 4 hours as needed for pain    -Start Claritin (Loratidine) 10mg once daily as needed for itching    -Follow up with Olman Bonds

## 2019-03-26 NOTE — PROGRESS NOTES
Palliative Care Follow Up Note     Patient Name: Xochitl Guzman   YOB: 1950   Medical Record Number: [id number]   Date of visit: 3/26/2019     Chief Complaint/Reason for Visit:  Patient presents with:  Palliative Care       History of Angela file      Number of children: 3      Years of education: Not on file      Highest education level: Not on file    Occupational History      Occupation:         Comment: retired    Tobacco Use      Smoking status: Former Smoker        Years: 50.00 for shortness of breath (Improved since completing RT- does not have to lie flat and still for treatments). Cardiovascular: Negative. Negative for chest pain, palpitations, orthopnea, claudication, leg swelling and PND. Gastrointestinal: Negative.   Madalyn Dalton and time. Skin: Skin is warm and dry. No rash noted. He is not diaphoretic. Vitals reviewed.     Palliative Care Goals of Care:  Discussed with patient/family today: Deferred until next visit when daughter and wife are present  Patient's preference abou present  -Recommended Claritin 10mg QD/PRN for itchiness       Metastatic RCC with pulmonary metastasis  -Managed and monitored by Dr. Sadia Corbett        Palliative Performance Scale 90%    Palliative Care Follow-up:  I spent a total of   25 minutes with the francia

## 2019-03-26 NOTE — PROGRESS NOTES
Cancer Center Progress Note    Patient Name: Fabricio Bowser   YOB: 1950   Medical Record Number: [id number]   Attending Physician: Taj Ramey M.D.        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    H Not on file      Food insecurity:        Worry: Not on file        Inability: Not on file      Transportation needs:        Medical: Not on file        Non-medical: Not on file    Tobacco Use      Smoking status: Former Smoker        Years: 50.00        Ty Pain., Disp: 180 tablet, Rfl: 0  •  LORazepam 2 MG Oral Tab, TAKE 1/2 TABLET BY MOUTH EVERY MORNING AND 1 TABLET BY MOUTH EVERY NIGHT AT BEDTIME, Disp: 45 tablet, Rfl: 0  •  MetFORMIN HCl 850 MG Oral Tab, Take 1 tablet (850 mg total) by mouth daily with br TP 8.3 (H) 03/25/2019    ALB 3.4 03/25/2019    GLOBULIN 4.9 (H) 03/25/2019    AGRATIO 1.4 09/24/2016     (L) 03/25/2019    K 4.9 03/25/2019    CL 99 03/25/2019    CO2 26.0 03/25/2019       Radiology:  CT chest personally reviewed overall pulmonary

## 2019-03-28 ENCOUNTER — TELEPHONE (OUTPATIENT)
Dept: INTERNAL MEDICINE CLINIC | Facility: CLINIC | Age: 69
End: 2019-03-28

## 2019-03-28 RX ORDER — LORAZEPAM 2 MG/1
TABLET ORAL
Qty: 45 TABLET | Refills: 0 | Status: CANCELLED | OUTPATIENT
Start: 2019-03-28

## 2019-03-28 RX ORDER — LORAZEPAM 2 MG/1
TABLET ORAL
Qty: 45 TABLET | Refills: 2 | Status: SHIPPED
Start: 2019-03-28 | End: 2019-04-26

## 2019-03-28 RX ORDER — LORAZEPAM 2 MG/1
TABLET ORAL
Qty: 45 TABLET | Refills: 0 | OUTPATIENT
Start: 2019-03-28

## 2019-03-28 NOTE — TELEPHONE ENCOUNTER
Controlled medication pending for review. If approved needs to be called in or faxed by on-site staff.     Last Rx: 2/25/19 #45  LOV: 10/4/18

## 2019-03-28 NOTE — TELEPHONE ENCOUNTER
Current Outpatient Medications:   ••  LORazepam 2 MG Oral Tab, TAKE 1/2 TABLET BY MOUTH EVERY MORNING AND 1 TABLET BY MOUTH EVERY NIGHT AT BEDTIME, Disp: 45 tablet, Rfl: 0  •

## 2019-03-28 NOTE — TELEPHONE ENCOUNTER
No Protocol on this med. Controlled medication pending for review. If approved needs to be called in or faxed by on-site staff. Last Office Visit with PCP: 10/4/2018  Last Blood Pressures:  BP Readings from Last 2 Encounters:  03/26/19 : 104/59  03/26/19 : 104/59    LR 2-25-19 # 45      Unable to fill per protocol. Please advise. Thank you.

## 2019-03-29 RX ORDER — LORAZEPAM 2 MG/1
TABLET ORAL
Qty: 45 TABLET | Refills: 0 | OUTPATIENT
Start: 2019-03-29

## 2019-03-29 NOTE — TELEPHONE ENCOUNTER
Clinical staff please call in Rx or fax script and notify pt, thank you.  Please respond to pool: ISAURA IM ADO LPN/JHON

## 2019-03-29 NOTE — TELEPHONE ENCOUNTER
[phone], [phone] (Pharmacy)   Malachi Strickland LPN        8/14/14 3:79 PM   Note      3/28/19 Lorazepam  2 mg  [facility] per voice mail.

## 2019-03-30 ENCOUNTER — TELEPHONE (OUTPATIENT)
Dept: INTERNAL MEDICINE CLINIC | Facility: CLINIC | Age: 69
End: 2019-03-30

## 2019-03-30 DIAGNOSIS — E78.2 MIXED HYPERLIPIDEMIA: ICD-10-CM

## 2019-03-30 NOTE — TELEPHONE ENCOUNTER
Please advise if patient should still be on Atorvastatin 20mg, LOV 10/4/18 with Dr Ham Reis, no noted medication on file, unclear if med was discontinued.

## 2019-03-30 NOTE — TELEPHONE ENCOUNTER
.    Pharmacy states patient has not filled statin therapy in th elast 180 days. Questioning if patient should be on this medication still. If so please send refill   atorvastatin 20 MG Oral Tab Take 1 tablet (20 mg total) by mouth nightly.  Disp: 90 tab

## 2019-03-31 ENCOUNTER — SNF/IP PROF CHARGE ONLY (OUTPATIENT)
Dept: HEMATOLOGY/ONCOLOGY | Facility: HOSPITAL | Age: 69
End: 2019-03-31

## 2019-03-31 DIAGNOSIS — C64.9 METASTATIC RENAL CELL CARCINOMA, UNSPECIFIED LATERALITY (HCC): ICD-10-CM

## 2019-03-31 PROCEDURE — G9678 ONCOLOGY CARE MODEL SERVICE: HCPCS | Performed by: INTERNAL MEDICINE

## 2019-04-01 RX ORDER — ATORVASTATIN CALCIUM 20 MG/1
20 TABLET, FILM COATED ORAL NIGHTLY
Qty: 90 TABLET | Refills: 0 | Status: SHIPPED | OUTPATIENT
Start: 2019-04-01 | End: 2019-06-25

## 2019-04-03 ENCOUNTER — APPOINTMENT (OUTPATIENT)
Dept: LAB | Age: 69
End: 2019-04-03
Attending: INTERNAL MEDICINE
Payer: MEDICARE

## 2019-04-03 DIAGNOSIS — E78.2 MIXED HYPERLIPIDEMIA: ICD-10-CM

## 2019-04-03 PROCEDURE — 80061 LIPID PANEL: CPT

## 2019-04-03 PROCEDURE — 36415 COLL VENOUS BLD VENIPUNCTURE: CPT

## 2019-04-08 ENCOUNTER — LAB ENCOUNTER (OUTPATIENT)
Dept: LAB | Age: 69
End: 2019-04-08
Attending: INTERNAL MEDICINE
Payer: MEDICARE

## 2019-04-08 DIAGNOSIS — C78.00 MALIGNANT NEOPLASM METASTATIC TO LUNG, UNSPECIFIED LATERALITY (HCC): ICD-10-CM

## 2019-04-08 DIAGNOSIS — C64.9 METASTATIC RENAL CELL CARCINOMA, UNSPECIFIED LATERALITY (HCC): ICD-10-CM

## 2019-04-08 PROCEDURE — 80053 COMPREHEN METABOLIC PANEL: CPT

## 2019-04-08 PROCEDURE — 85025 COMPLETE CBC W/AUTO DIFF WBC: CPT

## 2019-04-08 PROCEDURE — 84443 ASSAY THYROID STIM HORMONE: CPT

## 2019-04-08 PROCEDURE — 36415 COLL VENOUS BLD VENIPUNCTURE: CPT

## 2019-04-09 ENCOUNTER — OFFICE VISIT (OUTPATIENT)
Dept: HEMATOLOGY/ONCOLOGY | Facility: HOSPITAL | Age: 69
End: 2019-04-09
Attending: INTERNAL MEDICINE
Payer: MEDICARE

## 2019-04-09 ENCOUNTER — OFFICE VISIT (OUTPATIENT)
Dept: RADIATION ONCOLOGY | Facility: HOSPITAL | Age: 69
End: 2019-04-09
Attending: RADIOLOGY
Payer: MEDICARE

## 2019-04-09 VITALS
WEIGHT: 157 LBS | OXYGEN SATURATION: 95 % | RESPIRATION RATE: 18 BRPM | TEMPERATURE: 98 F | HEART RATE: 102 BPM | SYSTOLIC BLOOD PRESSURE: 103 MMHG | BODY MASS INDEX: 25 KG/M2 | DIASTOLIC BLOOD PRESSURE: 60 MMHG

## 2019-04-09 VITALS
HEIGHT: 66 IN | HEART RATE: 102 BPM | BODY MASS INDEX: 25.23 KG/M2 | WEIGHT: 157 LBS | DIASTOLIC BLOOD PRESSURE: 60 MMHG | RESPIRATION RATE: 18 BRPM | SYSTOLIC BLOOD PRESSURE: 103 MMHG | TEMPERATURE: 98 F

## 2019-04-09 DIAGNOSIS — C64.9 METASTATIC RENAL CELL CARCINOMA, UNSPECIFIED LATERALITY (HCC): Primary | ICD-10-CM

## 2019-04-09 DIAGNOSIS — Z51.11 CHEMOTHERAPY MANAGEMENT, ENCOUNTER FOR: ICD-10-CM

## 2019-04-09 DIAGNOSIS — C78.00 MALIGNANT NEOPLASM METASTATIC TO LUNG, UNSPECIFIED LATERALITY (HCC): ICD-10-CM

## 2019-04-09 DIAGNOSIS — C79.51 SECONDARY CANCER OF BONE (HCC): Primary | ICD-10-CM

## 2019-04-09 DIAGNOSIS — D64.9 ANEMIA, UNSPECIFIED TYPE: ICD-10-CM

## 2019-04-09 PROCEDURE — 99215 OFFICE O/P EST HI 40 MIN: CPT | Performed by: INTERNAL MEDICINE

## 2019-04-09 PROCEDURE — 99211 OFF/OP EST MAY X REQ PHY/QHP: CPT

## 2019-04-09 PROCEDURE — 96413 CHEMO IV INFUSION 1 HR: CPT

## 2019-04-09 RX ORDER — 0.9 % SODIUM CHLORIDE 0.9 %
VIAL (ML) INJECTION
Status: DISCONTINUED
Start: 2019-04-09 | End: 2019-04-09

## 2019-04-09 RX ORDER — SODIUM CHLORIDE 9 MG/ML
INJECTION, SOLUTION INTRAVENOUS
Status: DISCONTINUED
Start: 2019-04-09 | End: 2019-04-09

## 2019-04-09 NOTE — PROGRESS NOTES
Cancer Center Progress Note    Patient Name: Gallito Jordan   YOB: 1950   Medical Record Number: [id number]   Attending Physician: Jacklyn Armando M.D.        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    H Inability: Not on file      Transportation needs:        Medical: Not on file        Non-medical: Not on file    Tobacco Use      Smoking status: Former Smoker        Years: 50.00        Types: Cigarettes        Quit date: 8/31/2018        Years since AND 1 TABLET BY MOUTH EVERY NIGHT AT BEDTIME, Disp: 45 tablet, Rfl: 2  •  METFORMIN  MG Oral Tab, TAKE 1 TABLET (850 MG) BY MOUTH DAILY WITH BREAKFAST, Disp: 90 tablet, Rfl: 1  •  HYDROcodone-acetaminophen 7.5-325 MG Oral Tab, Take 1 tablet by mouth  (L) 04/08/2019    K 5.0 04/08/2019     04/08/2019    CO2 26.0 04/08/2019       Radiology:  CT chest personally reviewed overall pulmonary metastasis have not significantly changed    Impression and Plan:  77-year-old  male former sm

## 2019-04-09 NOTE — PROGRESS NOTES
[facility]    PATIENT'S NAME: Kaykay Sangeeta   RADIATION ONCOLOGIST: Kim Malik.  Telma Bustamante MD   PATIENT ACCOUNT #: [de-identified] LOCATION: [address] [place] [address] RECORD #: [id number] YOB: 1950   FOLLOW-UP DATE: 04/09/2019       RADIA hemoptysis and no meaningful shortness of breath. He denies any significant side effects from the radiation apart from some mild skin irritation and itching that he had which was transient and long since resolved.   He continues on treatment with Dr. Ryan Oconnor 14:34:55  [facility] [id number]  NAD/    cc: Joselin Zavala. MD Ken Valles MD Sherri Schlichter Gerard Lutes, MD

## 2019-04-09 NOTE — PROGRESS NOTES
Pt seen in 1 month follow up with Dr Teofilo Moulton, having completed radiation to the RUL 3/12/19. Currently receiving Opdivo per Dr Beronica Monk. Feeling well overall. States his pain has improved post RT, but continues to take approx 5 Norco/day.  Follows closely wit

## 2019-04-09 NOTE — PROGRESS NOTES
Pt here for [address]. Arrives Ambulating independently, accompanied by Spouse and Family member       Patient is primarily [address] speaking. Pt prefers family to translate for him.     Modifications in dose or schedule: No.  Pt denies complaints today

## 2019-04-17 ENCOUNTER — OFFICE VISIT (OUTPATIENT)
Dept: HEMATOLOGY/ONCOLOGY | Facility: HOSPITAL | Age: 69
End: 2019-04-17
Attending: INTERNAL MEDICINE
Payer: MEDICARE

## 2019-04-17 VITALS
WEIGHT: 154 LBS | HEIGHT: 66 IN | HEART RATE: 96 BPM | RESPIRATION RATE: 18 BRPM | SYSTOLIC BLOOD PRESSURE: 99 MMHG | BODY MASS INDEX: 24.75 KG/M2 | TEMPERATURE: 98 F | DIASTOLIC BLOOD PRESSURE: 71 MMHG | OXYGEN SATURATION: 94 %

## 2019-04-17 DIAGNOSIS — T40.2X5A THERAPEUTIC OPIOID INDUCED CONSTIPATION: ICD-10-CM

## 2019-04-17 DIAGNOSIS — K59.03 THERAPEUTIC OPIOID INDUCED CONSTIPATION: ICD-10-CM

## 2019-04-17 DIAGNOSIS — C64.9 METASTATIC RENAL CELL CARCINOMA, UNSPECIFIED LATERALITY (HCC): ICD-10-CM

## 2019-04-17 DIAGNOSIS — G89.3 CANCER RELATED PAIN: Primary | ICD-10-CM

## 2019-04-17 DIAGNOSIS — C78.00 MALIGNANT NEOPLASM METASTATIC TO LUNG, UNSPECIFIED LATERALITY (HCC): ICD-10-CM

## 2019-04-17 PROCEDURE — 99214 OFFICE O/P EST MOD 30 MIN: CPT | Performed by: NURSE PRACTITIONER

## 2019-04-17 RX ORDER — HYDROCODONE BITARTRATE AND ACETAMINOPHEN 7.5; 325 MG/1; MG/1
1-2 TABLET ORAL
Qty: 180 TABLET | Refills: 0 | Status: SHIPPED | OUTPATIENT
Start: 2019-04-17 | End: 2019-05-16

## 2019-04-17 NOTE — PROGRESS NOTES
Palliative Care Follow Up Note     Patient Name: Fabricio Bowser   YOB: 1950   Medical Record Number: [id number]   Date of visit: 4/17/2019     Chief Complaint/Reason for Visit:  Patient presents with:  Palliative Care       History of Angela status:       Spouse name: Not on file      Number of children: 3      Years of education: Not on file      Highest education level: Not on file    Occupational History      Occupation: [employer]        Comment: retired    Tobacco Use      Smoking sta breath (Improved since completing RT- does not have to lie flat and still for treatments). Cardiovascular: Negative. Negative for chest pain, palpitations, orthopnea, claudication, leg swelling and PND. Gastrointestinal: Negative.   Negative for abdom patient/family today: Deferred today  Patient's preference about sharing medical information: Patient and family may receive information  Patient's decision making preferences: Fully involved and speak with family  Code status: FULL CODE  Have advanced dir MARIBEL Garcia [facility]  [phone]  4/17/19

## 2019-04-17 NOTE — PATIENT INSTRUCTIONS
-Please call Rosa M Smith with any Palliative Care concerns/questions @ [phone]     -Rx for Norco 7.5/325mg provided today- take 1 tab every 4 hours as needed for pain     -Follow up with Rosa M Smith in 3-4 weeks- Rosa M Smith will call Sarah Duarte to coordinate

## 2019-04-20 ENCOUNTER — HOSPITAL ENCOUNTER (OUTPATIENT)
Dept: CT IMAGING | Age: 69
Discharge: HOME | End: 2019-04-20
Attending: INTERNAL MEDICINE
Payer: MEDICARE

## 2019-04-20 DIAGNOSIS — C78.00 MALIGNANT NEOPLASM METASTATIC TO LUNG, UNSPECIFIED LATERALITY (HCC): ICD-10-CM

## 2019-04-20 DIAGNOSIS — C64.9 METASTATIC RENAL CELL CARCINOMA, UNSPECIFIED LATERALITY (HCC): ICD-10-CM

## 2019-04-20 PROCEDURE — 71260 CT THORAX DX C+: CPT | Performed by: INTERNAL MEDICINE

## 2019-04-20 PROCEDURE — 74177 CT ABD & PELVIS W/CONTRAST: CPT | Performed by: INTERNAL MEDICINE

## 2019-04-22 ENCOUNTER — LAB ENCOUNTER (OUTPATIENT)
Dept: LAB | Age: 69
End: 2019-04-22
Attending: INTERNAL MEDICINE
Payer: MEDICARE

## 2019-04-22 DIAGNOSIS — C78.00 MALIGNANT NEOPLASM METASTATIC TO LUNG, UNSPECIFIED LATERALITY (HCC): ICD-10-CM

## 2019-04-22 DIAGNOSIS — C64.9 METASTATIC RENAL CELL CARCINOMA, UNSPECIFIED LATERALITY (HCC): ICD-10-CM

## 2019-04-22 PROCEDURE — 84443 ASSAY THYROID STIM HORMONE: CPT

## 2019-04-22 PROCEDURE — 36415 COLL VENOUS BLD VENIPUNCTURE: CPT

## 2019-04-22 PROCEDURE — 85025 COMPLETE CBC W/AUTO DIFF WBC: CPT

## 2019-04-22 PROCEDURE — 80053 COMPREHEN METABOLIC PANEL: CPT

## 2019-04-23 ENCOUNTER — OFFICE VISIT (OUTPATIENT)
Dept: HEMATOLOGY/ONCOLOGY | Facility: HOSPITAL | Age: 69
End: 2019-04-23
Attending: INTERNAL MEDICINE
Payer: MEDICARE

## 2019-04-23 VITALS
HEIGHT: 66 IN | OXYGEN SATURATION: 93 % | WEIGHT: 153 LBS | HEART RATE: 99 BPM | DIASTOLIC BLOOD PRESSURE: 57 MMHG | SYSTOLIC BLOOD PRESSURE: 104 MMHG | TEMPERATURE: 98 F | BODY MASS INDEX: 24.59 KG/M2 | RESPIRATION RATE: 18 BRPM

## 2019-04-23 VITALS
WEIGHT: 153 LBS | HEIGHT: 66 IN | SYSTOLIC BLOOD PRESSURE: 104 MMHG | HEART RATE: 99 BPM | BODY MASS INDEX: 24.59 KG/M2 | RESPIRATION RATE: 18 BRPM | DIASTOLIC BLOOD PRESSURE: 57 MMHG | TEMPERATURE: 98 F | OXYGEN SATURATION: 93 %

## 2019-04-23 DIAGNOSIS — C78.00 MALIGNANT NEOPLASM METASTATIC TO LUNG, UNSPECIFIED LATERALITY (HCC): ICD-10-CM

## 2019-04-23 DIAGNOSIS — C64.9 METASTATIC RENAL CELL CARCINOMA, UNSPECIFIED LATERALITY (HCC): Primary | ICD-10-CM

## 2019-04-23 DIAGNOSIS — Z51.11 CHEMOTHERAPY MANAGEMENT, ENCOUNTER FOR: ICD-10-CM

## 2019-04-23 DIAGNOSIS — E87.1 HYPONATREMIA: ICD-10-CM

## 2019-04-23 DIAGNOSIS — D64.9 ANEMIA, UNSPECIFIED TYPE: ICD-10-CM

## 2019-04-23 PROCEDURE — 96413 CHEMO IV INFUSION 1 HR: CPT

## 2019-04-23 PROCEDURE — 99215 OFFICE O/P EST HI 40 MIN: CPT | Performed by: INTERNAL MEDICINE

## 2019-04-23 RX ORDER — 0.9 % SODIUM CHLORIDE 0.9 %
VIAL (ML) INJECTION
Status: DISCONTINUED
Start: 2019-04-23 | End: 2019-04-23

## 2019-04-23 RX ORDER — SODIUM CHLORIDE 9 MG/ML
INJECTION, SOLUTION INTRAVENOUS
Status: DISCONTINUED
Start: 2019-04-23 | End: 2019-04-23

## 2019-04-23 NOTE — PROGRESS NOTES
Pt here for [address] [place] [address]. Arrives Ambulating independently, accompanied by Spouse and Family member       Patient is primarily [address] speaking. Pt prefers family to translate for him. Modifications in dose or schedule: Yes.   Patient is switching to o

## 2019-04-23 NOTE — PROGRESS NOTES
Cancer Center Progress Note    Patient Name: Samuel Venegas   YOB: 1950   Medical Record Number: [id number]   Attending Physician: Cely Lind M.D.        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    H Inability: Not on file      Transportation needs:        Medical: Not on file        Non-medical: Not on file    Tobacco Use      Smoking status: Former Smoker        Years: 50.00        Types: Cigarettes        Quit date: 8/31/2018        Years since Tab, Take 1 tablet (20 mg total) by mouth nightly., Disp: 90 tablet, Rfl: 0  •  LORazepam 2 MG Oral Tab, TAKE 1/2 TABLET BY MOUTH EVERY MORNING AND 1 TABLET BY MOUTH EVERY NIGHT AT BEDTIME, Disp: 45 tablet, Rfl: 2  •  METFORMIN  MG Oral Tab, TAKE 1 04/22/2019    AGRATIO 1.4 09/24/2016     (L) 04/22/2019    K 4.0 04/22/2019     04/22/2019    CO2 22.0 04/22/2019       Radiology:  CT abdomen pelvis personally reviewed.   Relatively stable disease with subtle radiographic progression in the ch

## 2019-04-26 ENCOUNTER — TELEPHONE (OUTPATIENT)
Dept: INTERNAL MEDICINE CLINIC | Facility: CLINIC | Age: 69
End: 2019-04-26

## 2019-04-27 RX ORDER — LORAZEPAM 2 MG/1
TABLET ORAL
Qty: 45 TABLET | Refills: 1 | OUTPATIENT
Start: 2019-04-27 | End: 2019-07-05

## 2019-04-27 NOTE — TELEPHONE ENCOUNTER
Controlled medication pending for review. If approved needs to be called in or faxed by on-site staff.     Requested Prescriptions     Pending Prescriptions Disp Refills   • LORazepam 2 MG Oral Tab 45 tablet 2     Sig: TAKE 1/2 TABLET BY MOUTH EVERY April Fetter

## 2019-04-29 NOTE — TELEPHONE ENCOUNTER
Clinical staff please call in Rx and notify pt, thank you.  Please respond to pool: ISAURA ZUÑIGAO DALLASN/JHON

## 2019-04-29 NOTE — TELEPHONE ENCOUNTER
Called [facility] pharmacy and left rx approval for Lorazepam as authorized by Dr. Preet Flores.    LORazepam 2 MG Oral Tab 45 tablet 1 4/27/2019     Sig: TAKE 1/2 TABLET BY MOUTH EVERY MORNING AND 1 TABLET BY MOUTH EVERY NIGHT AT BEDTIME    Class: Phone in

## 2019-04-30 ENCOUNTER — SNF/IP PROF CHARGE ONLY (OUTPATIENT)
Dept: HEMATOLOGY/ONCOLOGY | Facility: HOSPITAL | Age: 69
End: 2019-04-30

## 2019-04-30 DIAGNOSIS — C64.9 METASTATIC RENAL CELL CARCINOMA, UNSPECIFIED LATERALITY (HCC): ICD-10-CM

## 2019-04-30 PROCEDURE — G9678 ONCOLOGY CARE MODEL SERVICE: HCPCS | Performed by: INTERNAL MEDICINE

## 2019-05-02 ENCOUNTER — OFFICE VISIT (OUTPATIENT)
Dept: INTERNAL MEDICINE CLINIC | Facility: CLINIC | Age: 69
End: 2019-05-02
Payer: MEDICARE

## 2019-05-02 ENCOUNTER — APPOINTMENT (OUTPATIENT)
Dept: LAB | Age: 69
End: 2019-05-02
Attending: INTERNAL MEDICINE
Payer: MEDICARE

## 2019-05-02 VITALS
HEART RATE: 101 BPM | BODY MASS INDEX: 23.19 KG/M2 | SYSTOLIC BLOOD PRESSURE: 93 MMHG | HEIGHT: 68 IN | TEMPERATURE: 97 F | RESPIRATION RATE: 12 BRPM | DIASTOLIC BLOOD PRESSURE: 52 MMHG | WEIGHT: 153 LBS

## 2019-05-02 DIAGNOSIS — C64.9 METASTATIC RENAL CELL CARCINOMA, UNSPECIFIED LATERALITY (HCC): ICD-10-CM

## 2019-05-02 DIAGNOSIS — F41.9 ANXIETY: ICD-10-CM

## 2019-05-02 DIAGNOSIS — E11.9 CONTROLLED TYPE 2 DIABETES MELLITUS WITHOUT COMPLICATION, WITHOUT LONG-TERM CURRENT USE OF INSULIN (HCC): ICD-10-CM

## 2019-05-02 DIAGNOSIS — E11.9 CONTROLLED TYPE 2 DIABETES MELLITUS WITHOUT COMPLICATION, WITHOUT LONG-TERM CURRENT USE OF INSULIN (HCC): Primary | ICD-10-CM

## 2019-05-02 PROCEDURE — 83036 HEMOGLOBIN GLYCOSYLATED A1C: CPT

## 2019-05-02 PROCEDURE — G0463 HOSPITAL OUTPT CLINIC VISIT: HCPCS | Performed by: INTERNAL MEDICINE

## 2019-05-02 PROCEDURE — 99214 OFFICE O/P EST MOD 30 MIN: CPT | Performed by: INTERNAL MEDICINE

## 2019-05-02 PROCEDURE — 36415 COLL VENOUS BLD VENIPUNCTURE: CPT

## 2019-05-02 NOTE — PROGRESS NOTES
HPI:    Patient ID: Samuel Venegas is a 71year old male. Anxiety   This is a chronic problem. The current episode started more than 1 year ago. The problem occurs intermittently. Progression since onset: stable. Associated symptoms include coughing. MG Oral Tab Take 1-2 tablets by mouth every 4 to 6 hours as needed for Pain. Disp: 180 tablet Rfl: 0   METFORMIN  MG Oral Tab TAKE 1 TABLET (850 MG) BY MOUTH DAILY WITH BREAKFAST Disp: 90 tablet Rfl: 1   ASPIRIN OR Take 81 mg by mouth daily.    Disp: insulin ([address]  (primary encounter diagnosis)  Plan: HEMOGLOBIN A1C        We will check a1c; continue with diet.  Pt also advised to see optha for eye exam.   I told pt/spouse to check with his oncologist if pt can take his pneumovax 23.   (C64.9) Jarod Spine

## 2019-05-07 ENCOUNTER — APPOINTMENT (OUTPATIENT)
Dept: HEMATOLOGY/ONCOLOGY | Facility: HOSPITAL | Age: 69
End: 2019-05-07
Attending: INTERNAL MEDICINE
Payer: MEDICARE

## 2019-05-08 ENCOUNTER — TELEPHONE (OUTPATIENT)
Dept: HEMATOLOGY/ONCOLOGY | Facility: HOSPITAL | Age: 69
End: 2019-05-08

## 2019-05-08 NOTE — TELEPHONE ENCOUNTER
[facility] has left patient voicemails regarding prescription patient assistance options and has not heard back. I called pt's daughter Xiao Gallardo MCKENNA left asking her to please call back regarding an update on her father's medication.       (These details

## 2019-05-09 ENCOUNTER — TELEPHONE (OUTPATIENT)
Dept: HEMATOLOGY/ONCOLOGY | Facility: HOSPITAL | Age: 69
End: 2019-05-09

## 2019-05-09 NOTE — TELEPHONE ENCOUNTER
Called Yuliana back and gave her the phone number for Aparna with [facility] [facility], she will call her today. I reminded Michaela Rogelio that once her dad receives his medication, she should call me to set up a teaching appt. She verbalized understanding, thanked me for update.

## 2019-05-16 ENCOUNTER — OFFICE VISIT (OUTPATIENT)
Dept: HEMATOLOGY/ONCOLOGY | Facility: HOSPITAL | Age: 69
End: 2019-05-16
Attending: INTERNAL MEDICINE
Payer: MEDICARE

## 2019-05-16 VITALS
RESPIRATION RATE: 16 BRPM | SYSTOLIC BLOOD PRESSURE: 102 MMHG | OXYGEN SATURATION: 94 % | HEART RATE: 101 BPM | DIASTOLIC BLOOD PRESSURE: 58 MMHG | BODY MASS INDEX: 24.27 KG/M2 | TEMPERATURE: 98 F | WEIGHT: 151 LBS | HEIGHT: 66 IN

## 2019-05-16 DIAGNOSIS — G89.3 CANCER RELATED PAIN: Primary | ICD-10-CM

## 2019-05-16 DIAGNOSIS — K59.03 THERAPEUTIC OPIOID INDUCED CONSTIPATION: ICD-10-CM

## 2019-05-16 DIAGNOSIS — C64.9 METASTATIC RENAL CELL CARCINOMA, UNSPECIFIED LATERALITY (HCC): ICD-10-CM

## 2019-05-16 DIAGNOSIS — C78.00 MALIGNANT NEOPLASM METASTATIC TO LUNG, UNSPECIFIED LATERALITY (HCC): ICD-10-CM

## 2019-05-16 DIAGNOSIS — T40.2X5A THERAPEUTIC OPIOID INDUCED CONSTIPATION: ICD-10-CM

## 2019-05-16 DIAGNOSIS — R05.9 COUGH: ICD-10-CM

## 2019-05-16 PROCEDURE — 99214 OFFICE O/P EST MOD 30 MIN: CPT | Performed by: NURSE PRACTITIONER

## 2019-05-16 RX ORDER — HYDROCODONE BITARTRATE AND ACETAMINOPHEN 7.5; 325 MG/1; MG/1
1-2 TABLET ORAL
Qty: 180 TABLET | Refills: 0 | Status: SHIPPED | OUTPATIENT
Start: 2019-05-16 | End: 2019-06-12

## 2019-05-16 RX ORDER — BENZONATATE 100 MG/1
100 CAPSULE ORAL 3 TIMES DAILY PRN
Qty: 90 CAPSULE | Refills: 2 | Status: ON HOLD | OUTPATIENT
Start: 2019-05-16 | End: 2019-06-15

## 2019-05-16 NOTE — PATIENT INSTRUCTIONS
-Please call Renaldo Rodas with any Palliative Care concerns/questions @ [phone]     -Rx for Norco 7.5/325mg provided today- take 1-2 tabs every 4-6 hours as needed for pain    -May take ibuprofen 400mg-600mg (OTC) every 6-8 hours as needed    -Benzonatate- tina

## 2019-05-16 NOTE — PROGRESS NOTES
Palliative Care Follow Up Note     Patient Name: Xochitl Guzman   YOB: 1950   Medical Record Number: [id number]   Date of visit: 5/16/19    Chief Complaint/Reason for Visit:  Patient presents with:  Palliative Care       History of Present History:  Family History   Problem Relation Age of Onset   • Cancer Sister    • Cancer Brother        Social History:  Social History    Socioeconomic History      Marital status:       Spouse name: Not on file      Number of children: 3      Years loss.        Patient reports slightly decreased appetite    Completed RT        HENT: Negative. Negative for congestion, ear discharge, ear pain, hearing loss, nosebleeds, sinus pain, sore throat and tinnitus. Eyes: Negative.   Negative for blurred visi Soft. He exhibits no distension. There is no tenderness. Musculoskeletal: Normal range of motion. He exhibits no edema.    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

## 2019-05-17 ENCOUNTER — TELEPHONE (OUTPATIENT)
Dept: HEMATOLOGY/ONCOLOGY | Facility: HOSPITAL | Age: 69
End: 2019-05-17

## 2019-05-17 NOTE — TELEPHONE ENCOUNTER
Called Kaya back. She needs proof of income and a patient auth form to proceed w request for cabozantinib. States she has tried contacting patient about this and hasn't gotten a call back. I gave her pt's daughter's number as another number to try.   I

## 2019-05-17 NOTE — TELEPHONE ENCOUNTER
Kaya from [employer] requesting a call back from [facility]. She stts they have attempted to reach the pt 3 times to try and get information needed. They are missing patient auth form and proof of income.  If they don't receive the missing information or call

## 2019-05-20 ENCOUNTER — APPOINTMENT (OUTPATIENT)
Dept: HEMATOLOGY/ONCOLOGY | Facility: HOSPITAL | Age: 69
End: 2019-05-20
Attending: INTERNAL MEDICINE
Payer: MEDICARE

## 2019-05-21 ENCOUNTER — APPOINTMENT (OUTPATIENT)
Dept: HEMATOLOGY/ONCOLOGY | Facility: HOSPITAL | Age: 69
End: 2019-05-21
Attending: INTERNAL MEDICINE
Payer: MEDICARE

## 2019-05-21 ENCOUNTER — TELEPHONE (OUTPATIENT)
Dept: HEMATOLOGY/ONCOLOGY | Facility: HOSPITAL | Age: 69
End: 2019-05-21

## 2019-05-21 NOTE — TELEPHONE ENCOUNTER
Spoke with Daughter Bakari Lara, they were waiting for letter that needed to be sign for financial assistance. I told her the information I had and she was going to call pharmacy for delivery and will call back to set up educational appt when she has more info.

## 2019-05-21 NOTE — TELEPHONE ENCOUNTER
Notified Leonard Osborne from [facility] about this update, she notified the son to contact Jack Robie.

## 2019-05-21 NOTE — TELEPHONE ENCOUNTER
Gayle Massey called to say that Daniela Given has been approved financial aid. He will need to call the pharmacy to set up delivery. The speciality pharmacy number [phone] it is [address] [facility].  Please advise

## 2019-05-24 ENCOUNTER — TELEPHONE (OUTPATIENT)
Dept: HEMATOLOGY/ONCOLOGY | Facility: HOSPITAL | Age: 69
End: 2019-05-24

## 2019-05-24 ENCOUNTER — NURSE ONLY (OUTPATIENT)
Dept: HEMATOLOGY/ONCOLOGY | Facility: HOSPITAL | Age: 69
End: 2019-05-24
Attending: INTERNAL MEDICINE
Payer: MEDICARE

## 2019-05-24 DIAGNOSIS — C64.9 METASTATIC RENAL CELL CARCINOMA, UNSPECIFIED LATERALITY (HCC): Primary | ICD-10-CM

## 2019-05-24 DIAGNOSIS — C78.00 MALIGNANT NEOPLASM METASTATIC TO LUNG, UNSPECIFIED LATERALITY (HCC): ICD-10-CM

## 2019-05-24 PROCEDURE — 36415 COLL VENOUS BLD VENIPUNCTURE: CPT

## 2019-05-24 PROCEDURE — 99215 OFFICE O/P EST HI 40 MIN: CPT | Performed by: PHYSICIAN ASSISTANT

## 2019-05-24 NOTE — PATIENT INSTRUCTIONS
Medication Education Record: Oral Therapy    Learner:  Patient, Spouse and Family Member    Barriers / Limitations:  Language  Son acts as .      Psychosocial Assessment:  patient psychosocial response appropriate    Diagnosis:   Renal cell cance hints during cancer treatment:    Diet:  o Avoid greasy or spicy foods on days surrounding chemotherapy  o Eat small frequent meals per day (6-7 meals) rather than 3 large meals  o Choose high calorie/high protein foods (chicken, hard cooked eggs, peanut b especially after baths.  o If scalp hair loss has occurred, protect the scalp from the sun by wearing a hat and use sunscreen. Apply alcohol-free moisturizer as needed.       When to call the doctor:  • Fever of 100.5 or greater or shaking chills  • Nausea appropriately labeled. 2. Only patients who need chemotherapy should take or touch the medication. 3. If caregivers are involved, caregivers should wear gloves when administering the medication to protect against exposure.   Discard used gloves immediat water.  2. Any sheets or clothes soiled with the bodily fluids should be machine washed twice in hot water with regular laundry detergent. Do not wash soiled garments with hands.   If the soiled garments cannot be washed right away, place them in a sealed

## 2019-05-24 NOTE — PROGRESS NOTES
Medication Education Record: Oral Therapy    Learner:  Patient, Spouse and Family Member    Barriers / Limitations:  Language  Son acts as .      Psychosocial Assessment:  patient psychosocial response appropriate    Diagnosis:   Renal cell cance cancer treatment:    Diet:  o Avoid greasy or spicy foods on days surrounding chemotherapy  o Eat small frequent meals per day (6-7 meals) rather than 3 large meals  o Choose high calorie/high protein foods (chicken, hard cooked eggs, peanut butter, cheese baths.  o If scalp hair loss has occurred, protect the scalp from the sun by wearing a hat and use sunscreen. Apply alcohol-free moisturizer as needed.       When to call the doctor:  • Fever of 100.5 or greater or shaking chills  • Nausea/vomiting not con labeled. 2. Only patients who need chemotherapy should take or touch the medication. 3. If caregivers are involved, caregivers should wear gloves when administering the medication to protect against exposure.   Discard used gloves immediately – do not u sheets or clothes soiled with the bodily fluids should be machine washed twice in hot water with regular laundry detergent. Do not wash soiled garments with hands.   If the soiled garments cannot be washed right away, place them in a sealed plastic bag unt with further questions. Reinforcement of education is needed at next visit? Yes   Language line declined by family.   45 minutes appointment with at minimum 50% time spent counseling/coordinating care

## 2019-05-24 NOTE — TELEPHONE ENCOUNTER
MELANIE at patient's home and called Ant Gamino, his daughter, to inform that his labs today are acceptable for treatment to begin on Monday, 5/27/19. Call prn.

## 2019-05-31 ENCOUNTER — SNF/IP PROF CHARGE ONLY (OUTPATIENT)
Dept: HEMATOLOGY/ONCOLOGY | Facility: HOSPITAL | Age: 69
End: 2019-05-31

## 2019-05-31 DIAGNOSIS — C64.9 METASTATIC RENAL CELL CARCINOMA, UNSPECIFIED LATERALITY (HCC): ICD-10-CM

## 2019-05-31 PROCEDURE — G9678 ONCOLOGY CARE MODEL SERVICE: HCPCS | Performed by: INTERNAL MEDICINE

## 2019-06-12 ENCOUNTER — HOSPITAL ENCOUNTER (INPATIENT)
Facility: HOSPITAL | Age: 69
LOS: 3 days | Discharge: HOME | DRG: 864 | End: 2019-06-15
Attending: EMERGENCY MEDICINE | Admitting: HOSPITALIST
Payer: MEDICARE

## 2019-06-12 ENCOUNTER — TELEPHONE (OUTPATIENT)
Dept: HEMATOLOGY/ONCOLOGY | Facility: HOSPITAL | Age: 69
End: 2019-06-12

## 2019-06-12 ENCOUNTER — APPOINTMENT (OUTPATIENT)
Dept: CT IMAGING | Facility: HOSPITAL | Age: 69
DRG: 864 | End: 2019-06-12
Attending: EMERGENCY MEDICINE
Payer: MEDICARE

## 2019-06-12 ENCOUNTER — OFFICE VISIT (OUTPATIENT)
Dept: HEMATOLOGY/ONCOLOGY | Facility: HOSPITAL | Age: 69
End: 2019-06-12
Attending: INTERNAL MEDICINE
Payer: MEDICARE

## 2019-06-12 ENCOUNTER — APPOINTMENT (OUTPATIENT)
Dept: GENERAL RADIOLOGY | Facility: HOSPITAL | Age: 69
DRG: 864 | End: 2019-06-12
Attending: EMERGENCY MEDICINE
Payer: MEDICARE

## 2019-06-12 ENCOUNTER — OFFICE VISIT (OUTPATIENT)
Dept: HEMATOLOGY/ONCOLOGY | Facility: HOSPITAL | Age: 69
End: 2019-06-12
Attending: NURSE PRACTITIONER
Payer: MEDICARE

## 2019-06-12 VITALS
DIASTOLIC BLOOD PRESSURE: 88 MMHG | BODY MASS INDEX: 23.53 KG/M2 | HEIGHT: 66 IN | HEART RATE: 116 BPM | RESPIRATION RATE: 18 BRPM | TEMPERATURE: 97 F | WEIGHT: 146.38 LBS | SYSTOLIC BLOOD PRESSURE: 127 MMHG

## 2019-06-12 DIAGNOSIS — C78.00 MALIGNANT NEOPLASM METASTATIC TO LUNG, UNSPECIFIED LATERALITY (HCC): ICD-10-CM

## 2019-06-12 DIAGNOSIS — R05.9 COUGH: ICD-10-CM

## 2019-06-12 DIAGNOSIS — R50.9 FEVER, UNSPECIFIED FEVER CAUSE: Primary | ICD-10-CM

## 2019-06-12 DIAGNOSIS — R63.4 WEIGHT LOSS: ICD-10-CM

## 2019-06-12 DIAGNOSIS — A41.9 SEPSIS DUE TO UNDETERMINED ORGANISM (HCC): ICD-10-CM

## 2019-06-12 DIAGNOSIS — C64.9 METASTATIC RENAL CELL CARCINOMA, UNSPECIFIED LATERALITY (HCC): ICD-10-CM

## 2019-06-12 DIAGNOSIS — R63.0 DECREASED APPETITE: ICD-10-CM

## 2019-06-12 DIAGNOSIS — D64.9 ANEMIA, UNSPECIFIED TYPE: ICD-10-CM

## 2019-06-12 DIAGNOSIS — C64.9 METASTATIC RENAL CELL CARCINOMA, UNSPECIFIED LATERALITY (HCC): Primary | ICD-10-CM

## 2019-06-12 DIAGNOSIS — K59.03 THERAPEUTIC OPIOID INDUCED CONSTIPATION: ICD-10-CM

## 2019-06-12 DIAGNOSIS — T40.2X5A THERAPEUTIC OPIOID INDUCED CONSTIPATION: ICD-10-CM

## 2019-06-12 DIAGNOSIS — G89.3 CANCER RELATED PAIN: Primary | ICD-10-CM

## 2019-06-12 DIAGNOSIS — Z51.11 CHEMOTHERAPY MANAGEMENT, ENCOUNTER FOR: ICD-10-CM

## 2019-06-12 LAB
ADENOVIRUS PCR:: NEGATIVE
ALBUMIN SERPL-MCNC: 3.1 G/DL (ref 3.4–5)
ALP LIVER SERPL-CCNC: 110 U/L (ref 45–117)
ALT SERPL-CCNC: 27 U/L (ref 16–61)
ANION GAP SERPL CALC-SCNC: 5 MMOL/L (ref 0–18)
AST SERPL-CCNC: 33 U/L (ref 15–37)
B PERT DNA SPEC QL NAA+PROBE: NEGATIVE
BACTERIA UR QL AUTO: NEGATIVE /HPF
BASOPHILS # BLD AUTO: 0.02 X10(3) UL (ref 0–0.2)
BASOPHILS NFR BLD AUTO: 0.5 %
BILIRUB DIRECT SERPL-MCNC: 0.1 MG/DL (ref 0–0.2)
BILIRUB SERPL-MCNC: 0.4 MG/DL (ref 0.1–2)
BILIRUB UR QL: NEGATIVE
BUN BLD-MCNC: 18 MG/DL (ref 7–18)
BUN/CREAT SERPL: 19.6 (ref 10–20)
C PNEUM DNA SPEC QL NAA+PROBE: NEGATIVE
CALCIUM BLD-MCNC: 8.1 MG/DL (ref 8.5–10.1)
CHLORIDE SERPL-SCNC: 96 MMOL/L (ref 98–112)
CLARITY UR: CLEAR
CO2 SERPL-SCNC: 26 MMOL/L (ref 21–32)
COLOR UR: YELLOW
CORONAVIRUS 229E PCR:: NEGATIVE
CORONAVIRUS HKU1 PCR:: NEGATIVE
CORONAVIRUS NL63 PCR:: NEGATIVE
CORONAVIRUS OC43 PCR:: NEGATIVE
CORTIS SERPL-MCNC: 27.7 UG/DL
CREAT BLD-MCNC: 0.92 MG/DL (ref 0.7–1.3)
DEPRECATED RDW RBC AUTO: 47.8 FL (ref 35.1–46.3)
EOSINOPHIL # BLD AUTO: 0.01 X10(3) UL (ref 0–0.7)
EOSINOPHIL NFR BLD AUTO: 0.2 %
ERYTHROCYTE [DISTWIDTH] IN BLOOD BY AUTOMATED COUNT: 15.9 % (ref 11–15)
FLUAV RNA SPEC QL NAA+PROBE: NEGATIVE
FLUBV RNA SPEC QL NAA+PROBE: NEGATIVE
GLUCOSE BLD-MCNC: 161 MG/DL (ref 70–99)
GLUCOSE BLDC GLUCOMTR-MCNC: 117 MG/DL (ref 70–99)
GLUCOSE BLDC GLUCOMTR-MCNC: 121 MG/DL (ref 70–99)
GLUCOSE BLDC GLUCOMTR-MCNC: 152 MG/DL (ref 70–99)
GLUCOSE UR-MCNC: NEGATIVE MG/DL
HAV IGM SER QL: 2 MG/DL (ref 1.6–2.6)
HCT VFR BLD AUTO: 39.3 % (ref 39–53)
HGB BLD-MCNC: 12.8 G/DL (ref 13–17.5)
IMM GRANULOCYTES # BLD AUTO: 0.03 X10(3) UL (ref 0–1)
IMM GRANULOCYTES NFR BLD: 0.7 %
KETONES UR-MCNC: NEGATIVE MG/DL
LACTATE SERPL-SCNC: 1.7 MMOL/L (ref 0.4–2)
LEUKOCYTE ESTERASE UR QL STRIP.AUTO: NEGATIVE
LYMPHOCYTES # BLD AUTO: 0.37 X10(3) UL (ref 1–4)
LYMPHOCYTES NFR BLD AUTO: 8.6 %
M PROTEIN MFR SERPL ELPH: 8.6 G/DL (ref 6.4–8.2)
MCH RBC QN AUTO: 27 PG (ref 26–34)
MCHC RBC AUTO-ENTMCNC: 32.6 G/DL (ref 31–37)
MCV RBC AUTO: 82.9 FL (ref 80–100)
METAPNEUMOVIRUS PCR:: NEGATIVE
MONOCYTES # BLD AUTO: 0.29 X10(3) UL (ref 0.1–1)
MONOCYTES NFR BLD AUTO: 6.8 %
MYCOPLASMA PNEUMONIA PCR:: NEGATIVE
NEUTROPHILS # BLD AUTO: 3.57 X10 (3) UL (ref 1.5–7.7)
NEUTROPHILS # BLD AUTO: 3.57 X10(3) UL (ref 1.5–7.7)
NEUTROPHILS NFR BLD AUTO: 83.2 %
NITRITE UR QL STRIP.AUTO: NEGATIVE
OSMOLALITY SERPL CALC.SUM OF ELEC: 269 MOSM/KG (ref 275–295)
PARAINFLUENZA 1 PCR:: NEGATIVE
PARAINFLUENZA 2 PCR:: NEGATIVE
PARAINFLUENZA 3 PCR:: NEGATIVE
PARAINFLUENZA 4 PCR:: NEGATIVE
PH UR: 7 [PH] (ref 5–8)
PLATELET # BLD AUTO: 157 10(3)UL (ref 150–450)
POTASSIUM SERPL-SCNC: 5 MMOL/L (ref 3.5–5.1)
PROT UR-MCNC: 100 MG/DL
RBC # BLD AUTO: 4.74 X10(6)UL (ref 3.8–5.8)
RBC #/AREA URNS AUTO: 2 /HPF
RHINOVIRUS/ENTERO PCR:: NEGATIVE
RSV RNA SPEC QL NAA+PROBE: NEGATIVE
SODIUM SERPL-SCNC: 127 MMOL/L (ref 136–145)
SP GR UR STRIP: 1.01 (ref 1–1.03)
UROBILINOGEN UR STRIP-ACNC: 4
VIT C UR-MCNC: NEGATIVE MG/DL
WBC # BLD AUTO: 4.3 X10(3) UL (ref 4–11)
WBC #/AREA URNS AUTO: 2 /HPF

## 2019-06-12 PROCEDURE — 99214 OFFICE O/P EST MOD 30 MIN: CPT | Performed by: NURSE PRACTITIONER

## 2019-06-12 PROCEDURE — 82248 BILIRUBIN DIRECT: CPT

## 2019-06-12 PROCEDURE — 70450 CT HEAD/BRAIN W/O DYE: CPT | Performed by: EMERGENCY MEDICINE

## 2019-06-12 PROCEDURE — 85025 COMPLETE CBC W/AUTO DIFF WBC: CPT

## 2019-06-12 PROCEDURE — 99223 1ST HOSP IP/OBS HIGH 75: CPT | Performed by: HOSPITALIST

## 2019-06-12 PROCEDURE — 36415 COLL VENOUS BLD VENIPUNCTURE: CPT

## 2019-06-12 PROCEDURE — 80053 COMPREHEN METABOLIC PANEL: CPT

## 2019-06-12 PROCEDURE — 71045 X-RAY EXAM CHEST 1 VIEW: CPT | Performed by: EMERGENCY MEDICINE

## 2019-06-12 PROCEDURE — 99215 OFFICE O/P EST HI 40 MIN: CPT | Performed by: INTERNAL MEDICINE

## 2019-06-12 RX ORDER — HYDROCODONE BITARTRATE AND ACETAMINOPHEN 7.5; 325 MG/1; MG/1
1-2 TABLET ORAL
Qty: 180 TABLET | Refills: 0 | Status: SHIPPED | OUTPATIENT
Start: 2019-06-12 | End: 2019-07-10 | Stop reason: DRUGHIGH

## 2019-06-12 RX ORDER — BENZONATATE 100 MG/1
100 CAPSULE ORAL 3 TIMES DAILY PRN
Status: DISCONTINUED | OUTPATIENT
Start: 2019-06-12 | End: 2019-06-15

## 2019-06-12 RX ORDER — LORAZEPAM 1 MG/1
2 TABLET ORAL NIGHTLY
Status: DISCONTINUED | OUTPATIENT
Start: 2019-06-12 | End: 2019-06-15

## 2019-06-12 RX ORDER — ASPIRIN 81 MG/1
81 TABLET, CHEWABLE ORAL DAILY
Status: DISCONTINUED | OUTPATIENT
Start: 2019-06-13 | End: 2019-06-15

## 2019-06-12 RX ORDER — ACETAMINOPHEN 500 MG
1000 TABLET ORAL ONCE
Status: COMPLETED | OUTPATIENT
Start: 2019-06-12 | End: 2019-06-12

## 2019-06-12 RX ORDER — KETOROLAC TROMETHAMINE 30 MG/ML
15 INJECTION, SOLUTION INTRAMUSCULAR; INTRAVENOUS ONCE
Status: COMPLETED | OUTPATIENT
Start: 2019-06-12 | End: 2019-06-12

## 2019-06-12 RX ORDER — DEXTROSE MONOHYDRATE 25 G/50ML
50 INJECTION, SOLUTION INTRAVENOUS AS NEEDED
Status: DISCONTINUED | OUTPATIENT
Start: 2019-06-12 | End: 2019-06-15

## 2019-06-12 RX ORDER — ACETAMINOPHEN 325 MG/1
650 TABLET ORAL EVERY 6 HOURS PRN
Status: DISCONTINUED | OUTPATIENT
Start: 2019-06-12 | End: 2019-06-15

## 2019-06-12 RX ORDER — PROCHLORPERAZINE MALEATE 10 MG
10 TABLET ORAL EVERY 6 HOURS PRN
Status: DISCONTINUED | OUTPATIENT
Start: 2019-06-12 | End: 2019-06-15

## 2019-06-12 RX ORDER — HYDROCODONE BITARTRATE AND ACETAMINOPHEN 7.5; 325 MG/1; MG/1
1-2 TABLET ORAL EVERY 4 HOURS PRN
Status: DISCONTINUED | OUTPATIENT
Start: 2019-06-12 | End: 2019-06-15

## 2019-06-12 RX ORDER — ONDANSETRON 2 MG/ML
4 INJECTION INTRAMUSCULAR; INTRAVENOUS EVERY 6 HOURS PRN
Status: DISCONTINUED | OUTPATIENT
Start: 2019-06-12 | End: 2019-06-15

## 2019-06-12 RX ORDER — SODIUM CHLORIDE 0.9 % (FLUSH) 0.9 %
3 SYRINGE (ML) INJECTION AS NEEDED
Status: DISCONTINUED | OUTPATIENT
Start: 2019-06-12 | End: 2019-06-15

## 2019-06-12 RX ORDER — LORAZEPAM 1 MG/1
1 TABLET ORAL DAILY
Status: DISCONTINUED | OUTPATIENT
Start: 2019-06-13 | End: 2019-06-15

## 2019-06-12 RX ORDER — SODIUM CHLORIDE 9 MG/ML
INJECTION, SOLUTION INTRAVENOUS CONTINUOUS
Status: DISCONTINUED | OUTPATIENT
Start: 2019-06-12 | End: 2019-06-14

## 2019-06-12 NOTE — ED PROVIDER NOTES
Patient Seen in: [facility] AND [facility] Emergency Department    History   Patient presents with:  Altered Mental Status (neurologic)    Stated Complaint: AMS, slurred speech    HPI    25-year-old male with history of renal cell carcinoma presents for complain Resp 25   Temp (!) 100.8 °F (38.2 °C)   Temp src Temporal   SpO2 95 %   O2 Device None (Room air)       Current:BP (!) 144/110   Pulse 118   Temp (!) 100.8 °F (38.2 °C) (Temporal)   Resp 24   Ht 172.7 cm (5' 8\")   Wt 66 kg   SpO2 95%   BMI 22.12 kg/m² DIFFERENTIAL[[id number]]          Abnormal            Final result                 Please view results for these tests on the individual orders.    BASIC METABOLIC PANEL (8)   HEPATIC FUNCTION PANEL (7)   MAGNESIUM   URINALYSIS WITH CULTURE REFLEX   RAINBOW

## 2019-06-12 NOTE — ED NOTES
Sepsis note: tylenol 1g & toradol 15mg given for fever to 102.3    Total volume received  1000ml/1000ml    Time Blood Cultures Drawn: 1609    Time first antibiotic started:  Cefepime @ 1735    Lactate: 1.7

## 2019-06-12 NOTE — ED INITIAL ASSESSMENT (HPI)
Pt woke from nap with slurred speech and AMS at 1100, AOx1 now, AOx4 at baseline. Last normal time was 0900. Cypriot language line used for assessment and to obtain information from pt's wife.

## 2019-06-12 NOTE — PATIENT INSTRUCTIONS
-Please call Keith Alatorre with any Palliative Care concerns/questions @ [phone]     -Rx for Norco 7.5/325mg provided today- take 1-2 tabs every 4-6 hours as needed for pain     -May take ibuprofen 400mg-600mg (OTC) every 6-8 hours as needed     -Drink 1-2 Ensu

## 2019-06-12 NOTE — ED NOTES
Orders for admission, patient and family are aware of plan and ready to go upstairs. Report given to Cuca Isaac RN. Pt transported to room 447 on room air with transport. VSS stable at time of departure.   Any questions, please call ED RN Antonella Nelson  at extension 1

## 2019-06-12 NOTE — PROGRESS NOTES
Palliative Care Follow Up Note     Patient Name: Sydnie Garcia   YOB: 1950   Medical Record Number: [id number]   Date of visit: 6/12/19    Chief Complaint/Reason for Visit:  Patient presents with:  Palliative Care       History of Present       Spouse name: Not on file      Number of children: 3      Years of education: Not on file      Highest education level: Not on file    Occupational History      Occupation:         Comment: retired    Tobacco Use      Smoking status: For discharge, ear pain, hearing loss, nosebleeds, sinus pain, sore throat and tinnitus. Eyes: Negative. Negative for blurred vision, double vision, photophobia and redness. Respiratory: Positive for cough (Improved;  Has 1-2 episodes of cough in the morn is alert and oriented to person, place, and time. Skin: Skin is warm and dry. No rash noted. He is not diaphoretic. Psychiatric: He has a normal mood and affect. His behavior is normal. Judgment and thought content normal.   Vitals reviewed.     Palliat stool/diarrhea      Decreased appetite/weight loss  -Discussed Mirtazapine for decreased appetite  -Pt declined Mirtazapine at this time  -Pt's daughter inquiring about Medical Cannabis to help increase appetite- she and Pt will discuss with Dr. Josefa Crabtree and ximena

## 2019-06-12 NOTE — TELEPHONE ENCOUNTER
Daughter states that father laid down after today's appointments, was weak. When he got up he was \"out of it\" . Not really responding to questions. Please call [facility].    [phone]

## 2019-06-12 NOTE — H&P
[facility]    PATIENT'S NAME: Francisca Prem   ATTENDING PHYSICIAN: Hank Auguste MD   PATIENT ACCOUNT#:   [id number]    LOCATION:  [place] [address]  MEDICAL RECORD #:   [id number]       YOB: 1950  ADMISSION DATE:       06/12/ last week, today became very confused. No significant cough. No dysuria or urinary frequency. No abdominal pain. Other 12-point review of systems is negative.       PHYSICAL EXAMINATION:    GENERAL:  Patient is alert, disoriented, able to follow simple

## 2019-06-12 NOTE — PROGRESS NOTES
Cancer Center Progress Note    Patient Name: Beto Dominguez   YOB: 1950   Medical Record Number: [id number]   Attending Physician: Ava Ellington M.D.        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 , Disp: 90 capsule, Rfl: 2  •  LORazepam 2 MG Oral Tab, TAKE 1/2 TABLET BY MOUTH EVERY MOR 06/12/2019    CA 8.2 (L) 06/12/2019    OSMOCALC 271 (L) 06/12/2019    ALKPHO 109 06/12/2019    AST 28 06/12/2019    ALT 26 06/12/2019    ALKPHOS 67 09/24/2016    BILT 0.4 06/12/2019    TP 8.4 (H) 06/12/2019    ALB 3.1 (L) 06/12/2019    GLOBULIN 5.3 (H) 06/

## 2019-06-12 NOTE — TELEPHONE ENCOUNTER
Toxicities:  Cabozantinab 60 mg po daily    Pt of Dr Christina Orozco with metastatic renal cell cancer with lung mets. Labs & f/u appt with Dr Christina Orozco today. Alterned Mental Status/Weakness     Altered Mental StatusWeakness: (Dtr & wife report the pt was not making sense when they were talking on the ride home from the cancer center. He could not get out of the car without assistance when he got home. He had been able to get out of the car & walk into the cancer center this morning. He now needs help to stand & walk. Pt stated he was hungry & tired. He ate a little lunch & took a nap. He is now only oriented x 2. He is not able to verbally answer questions about what day it is, what year it is. He can't get up out of bed.)    Son in law Danielito martino instructed to call 911. Patient's family is not sure if he will be brought to [facility] ER or [facility]'s ER. I called report the the [facility] ER .

## 2019-06-13 LAB
ANION GAP SERPL CALC-SCNC: 7 MMOL/L (ref 0–18)
BASOPHILS # BLD AUTO: 0.03 X10(3) UL (ref 0–0.2)
BASOPHILS NFR BLD AUTO: 0.8 %
BUN BLD-MCNC: 18 MG/DL (ref 7–18)
BUN/CREAT SERPL: 22 (ref 10–20)
CALCIUM BLD-MCNC: 7.8 MG/DL (ref 8.5–10.1)
CHLORIDE SERPL-SCNC: 100 MMOL/L (ref 98–112)
CO2 SERPL-SCNC: 25 MMOL/L (ref 21–32)
CREAT BLD-MCNC: 0.82 MG/DL (ref 0.7–1.3)
DEPRECATED RDW RBC AUTO: 47.7 FL (ref 35.1–46.3)
EOSINOPHIL # BLD AUTO: 0.02 X10(3) UL (ref 0–0.7)
EOSINOPHIL NFR BLD AUTO: 0.5 %
ERYTHROCYTE [DISTWIDTH] IN BLOOD BY AUTOMATED COUNT: 15.8 % (ref 11–15)
EST. AVERAGE GLUCOSE BLD GHB EST-MCNC: 157 MG/DL (ref 68–126)
GLUCOSE BLD-MCNC: 129 MG/DL (ref 70–99)
GLUCOSE BLDC GLUCOMTR-MCNC: 105 MG/DL (ref 70–99)
GLUCOSE BLDC GLUCOMTR-MCNC: 143 MG/DL (ref 70–99)
GLUCOSE BLDC GLUCOMTR-MCNC: 158 MG/DL (ref 70–99)
GLUCOSE BLDC GLUCOMTR-MCNC: 91 MG/DL (ref 70–99)
HBA1C MFR BLD HPLC: 7.1 % (ref ?–5.7)
HCT VFR BLD AUTO: 36.1 % (ref 39–53)
HGB BLD-MCNC: 11.9 G/DL (ref 13–17.5)
IMM GRANULOCYTES # BLD AUTO: 0.03 X10(3) UL (ref 0–1)
IMM GRANULOCYTES NFR BLD: 0.8 %
LYMPHOCYTES # BLD AUTO: 0.52 X10(3) UL (ref 1–4)
LYMPHOCYTES NFR BLD AUTO: 13.2 %
MCH RBC QN AUTO: 27.2 PG (ref 26–34)
MCHC RBC AUTO-ENTMCNC: 33 G/DL (ref 31–37)
MCV RBC AUTO: 82.4 FL (ref 80–100)
MONOCYTES # BLD AUTO: 0.42 X10(3) UL (ref 0.1–1)
MONOCYTES NFR BLD AUTO: 10.6 %
NEUTROPHILS # BLD AUTO: 2.93 X10 (3) UL (ref 1.5–7.7)
NEUTROPHILS # BLD AUTO: 2.93 X10(3) UL (ref 1.5–7.7)
NEUTROPHILS NFR BLD AUTO: 74.1 %
OSMOLALITY SERPL CALC.SUM OF ELEC: 278 MOSM/KG (ref 275–295)
PLATELET # BLD AUTO: 135 10(3)UL (ref 150–450)
POTASSIUM SERPL-SCNC: 4.3 MMOL/L (ref 3.5–5.1)
RBC # BLD AUTO: 4.38 X10(6)UL (ref 3.8–5.8)
SODIUM SERPL-SCNC: 132 MMOL/L (ref 136–145)
WBC # BLD AUTO: 4 X10(3) UL (ref 4–11)

## 2019-06-13 PROCEDURE — 99223 1ST HOSP IP/OBS HIGH 75: CPT | Performed by: INTERNAL MEDICINE

## 2019-06-13 PROCEDURE — 99233 SBSQ HOSP IP/OBS HIGH 50: CPT | Performed by: HOSPITALIST

## 2019-06-13 NOTE — H&P
[facility]    PATIENT'S NAME: Tommi Mohs   ATTENDING PHYSICIAN: Mckinley Corey MD   PATIENT ACCOUNT#:   [id number]    LOCATION:  [address] [place] [address]  MEDICAL RECORD #:   [id number]       YOB: 1950  ADMISSION DATE:       06/12/ patient has been having fever, chills, diaphoresis for the last week, today became very confused. No significant cough. No dysuria or urinary frequency. No abdominal pain. Other 12-point review of systems is negative.        PHYSICAL EXAMINATION:    GEN insufficiency.     Dictated By Lidia Baez MD  d: 06/12/2019 17:05:26  t: 06/13/2019 08:12:37  Job [id number]  KD/

## 2019-06-13 NOTE — PLAN OF CARE
Oral chemo on hold per oncology MD.  IVF infusing. Cefepime as scheduled. Voiding. Had bowel movement. Wife at bedside. Tele monitor in place. Tele tech called this RN regarding inverted T wave. Patient is asymptomatic. Vital signs within normal range.  MD delirium  Description  Interventions:  - Encourage use of hearing aids, eye glasses  - Promote highest level of mobility daily  - Provide frequent reorientation  - Promote wakefulness i.e. lights on, blinds open  - Promote sleep, encourage patient's normal Consider OT/PT consult to assist with strengthening/mobility  - Encourage toileting schedule  Outcome: Progressing     Problem: Altered Communication/Language Barrier  Goal: Patient/Family is able to understand and participate in their care  Description  I

## 2019-06-13 NOTE — CONSULTS
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    History of Present Illness:  6 Not on file      Food insecurity:        Worry: Not on file        Inability: Not on file      Transportation needs:        Medical: Not on file        Non-medical: Not on file    Tobacco Use      Smoking status: Former Smoker        Years: 50.00        Ty x12    Vital Signs:  /73 (BP Location: Right arm)   Pulse 93   Temp 97.8 °F (36.6 °C) (Oral)   Resp 18   Ht 1.727 m (5' 8\")   Wt 66 kg (145 lb 8.1 oz)   SpO2 96%   BMI 22.12 kg/m²     Physical Examination:  General: Patient is alert and oriented x 3 Started cabozantinib May 2019  –Admitted to the hospital with fevers and encephalopathy. Await cultures.   He is on IV antibiotics for now if cultures are negative for 48 hours would discontinue antibiotics   –hold cabozantinib while admitted he can resume

## 2019-06-13 NOTE — PROGRESS NOTES
[facility]    Progress Note    Bernarda Young Patient Status:  Inpatient    1950 MRN [id number]   Location [facility] 4W/SW/SE Attending Taya Dickerson MD   Hosp Day # 1 PCP Vito Dong MD       Subjective: Lab Results   Component Value Date    WBC 4.0 06/13/2019    HGB 11.9 (L) 06/13/2019    HCT 36.1 (L) 06/13/2019    .0 (L) 06/13/2019    CREATSERUM 0.82 06/13/2019    BUN 18 06/13/2019     (L) 06/13/2019    K 4.3 06/13/2019     06/13/2

## 2019-06-14 ENCOUNTER — APPOINTMENT (OUTPATIENT)
Dept: GENERAL RADIOLOGY | Facility: HOSPITAL | Age: 69
DRG: 864 | End: 2019-06-14
Attending: HOSPITALIST
Payer: MEDICARE

## 2019-06-14 LAB
ANION GAP SERPL CALC-SCNC: 6 MMOL/L (ref 0–18)
BASOPHILS # BLD AUTO: 0.04 X10(3) UL (ref 0–0.2)
BASOPHILS NFR BLD AUTO: 1 %
BUN BLD-MCNC: 13 MG/DL (ref 7–18)
BUN/CREAT SERPL: 17.8 (ref 10–20)
CALCIUM BLD-MCNC: 7.8 MG/DL (ref 8.5–10.1)
CHLORIDE SERPL-SCNC: 101 MMOL/L (ref 98–112)
CO2 SERPL-SCNC: 25 MMOL/L (ref 21–32)
CREAT BLD-MCNC: 0.73 MG/DL (ref 0.7–1.3)
DEPRECATED RDW RBC AUTO: 47.4 FL (ref 35.1–46.3)
EOSINOPHIL # BLD AUTO: 0.04 X10(3) UL (ref 0–0.7)
EOSINOPHIL NFR BLD AUTO: 1 %
ERYTHROCYTE [DISTWIDTH] IN BLOOD BY AUTOMATED COUNT: 15.9 % (ref 11–15)
GLUCOSE BLD-MCNC: 118 MG/DL (ref 70–99)
GLUCOSE BLDC GLUCOMTR-MCNC: 116 MG/DL (ref 70–99)
GLUCOSE BLDC GLUCOMTR-MCNC: 117 MG/DL (ref 70–99)
GLUCOSE BLDC GLUCOMTR-MCNC: 121 MG/DL (ref 70–99)
GLUCOSE BLDC GLUCOMTR-MCNC: 138 MG/DL (ref 70–99)
HAV IGM SER QL: 1.8 MG/DL (ref 1.6–2.6)
HCT VFR BLD AUTO: 37.5 % (ref 39–53)
HGB BLD-MCNC: 12.3 G/DL (ref 13–17.5)
IMM GRANULOCYTES # BLD AUTO: 0.03 X10(3) UL (ref 0–1)
IMM GRANULOCYTES NFR BLD: 0.7 %
LYMPHOCYTES # BLD AUTO: 0.57 X10(3) UL (ref 1–4)
LYMPHOCYTES NFR BLD AUTO: 13.7 %
MCH RBC QN AUTO: 26.9 PG (ref 26–34)
MCHC RBC AUTO-ENTMCNC: 32.8 G/DL (ref 31–37)
MCV RBC AUTO: 81.9 FL (ref 80–100)
MONOCYTES # BLD AUTO: 0.33 X10(3) UL (ref 0.1–1)
MONOCYTES NFR BLD AUTO: 7.9 %
NEUTROPHILS # BLD AUTO: 3.15 X10 (3) UL (ref 1.5–7.7)
NEUTROPHILS # BLD AUTO: 3.15 X10(3) UL (ref 1.5–7.7)
NEUTROPHILS NFR BLD AUTO: 75.7 %
OSMOLALITY SERPL CALC.SUM OF ELEC: 275 MOSM/KG (ref 275–295)
PLATELET # BLD AUTO: 145 10(3)UL (ref 150–450)
POTASSIUM SERPL-SCNC: 4 MMOL/L (ref 3.5–5.1)
RBC # BLD AUTO: 4.58 X10(6)UL (ref 3.8–5.8)
SODIUM SERPL-SCNC: 132 MMOL/L (ref 136–145)
WBC # BLD AUTO: 4.2 X10(3) UL (ref 4–11)

## 2019-06-14 PROCEDURE — 71046 X-RAY EXAM CHEST 2 VIEWS: CPT | Performed by: HOSPITALIST

## 2019-06-14 PROCEDURE — 99233 SBSQ HOSP IP/OBS HIGH 50: CPT | Performed by: HOSPITALIST

## 2019-06-14 PROCEDURE — 99232 SBSQ HOSP IP/OBS MODERATE 35: CPT | Performed by: INTERNAL MEDICINE

## 2019-06-14 NOTE — PLAN OF CARE
Problem: Patient Centered Care  Goal: Patient preferences are identified and integrated in the patient's plan of care  Description  Interventions:  - What would you like us to know as we care for you?  Primarily Romansh speaking  - Provide timely, complet Encourage family to assist in orientation and promotion of home routines  Outcome: Progressing     Problem: PAIN - ADULT  Goal: Verbalizes/displays adequate comfort level or patient's stated pain goal  Description  INTERVENTIONS:  - Encourage pt to monitor style  - Implement communication aides and strategies  - Use visual cues when possible  - Listen attentively, be patient, do not interrupt  - Minimize distractions  - Allow time for understanding and response  - Establish method for patient to ask for assi

## 2019-06-14 NOTE — PLAN OF CARE
Plan of care reviewed with patient and family. Plan for EKG due to abnormalities seen on tele monitor. Patient remains on IV antibiotics. Norco given for pain management. Wife at bedside. Safety measures in place.    Problem: Patient Centered Care  Goal: Pa daily  - Provide frequent reorientation  - Promote wakefulness i.e. lights on, blinds open  - Promote sleep, encourage patient's normal rest cycle i.e. lights off, TV off, minimize noise and interruptions  - Encourage family to assist in orientation and pr Altered Communication/Language Barrier  Goal: Patient/Family is able to understand and participate in their care  Description  Interventions:  - Assess communication ability and preferred communication style  - Implement communication aides and strategies

## 2019-06-14 NOTE — PROGRESS NOTES
[facility]    Progress Note    Samuel Venegas Patient Status:  Inpatient    1950 MRN [id number]   Location [facility] 4W/SW/SE Attending Prabhu Puentes MD   Hosp Day # 2 PCP Adolfo Jones MD       Subjective: and smoking    DVT proph: SCD    Dispo: Pending, possibly home tomorrow if continues to improved  Greater than 35 minutes spent, >50% spent counseling and coordinating of care as outlined above.          Results:     Lab Results   Component Value Date    WB Approved by (CST): Franky Kerr MD on 6/12/2019 at 17:35          Xr Chest Ap Portable  (cpt=71045)    Result Date: 6/12/2019  CONCLUSION:  1.  Pulmonary vascular congestion. Cardiomegaly.   Correlate for CHF and/or increase fluid volume status of the francia

## 2019-06-14 NOTE — CDS QUERY
Clarification – Encephalopathy  CLINICAL DOCUMENTATION CLARIFICATION FORM    Dear Doctor:  Documentation in the medical record includes a diagnosis of encephalopathy.   For accurate ICD-10-CM code assignment to reflect severity of illness and risk of mortal

## 2019-06-14 NOTE — PLAN OF CARE
Problem: Patient Centered Care  Goal: Patient preferences are identified and integrated in the patient's plan of care  Description  Interventions:  - What would you like us to know as we care for you?  Primarily Bengali speaking  - Provide timely, complet Encourage family to assist in orientation and promotion of home routines  Outcome: Progressing     Problem: PAIN - ADULT  Goal: Verbalizes/displays adequate comfort level or patient's stated pain goal  Description  INTERVENTIONS:  - Encourage pt to monitor style  - Implement communication aides and strategies  - Use visual cues when possible  - Listen attentively, be patient, do not interrupt  - Minimize distractions  - Allow time for understanding and response  - Establish method for patient to ask for assi

## 2019-06-15 VITALS
RESPIRATION RATE: 18 BRPM | OXYGEN SATURATION: 98 % | HEART RATE: 98 BPM | BODY MASS INDEX: 22.05 KG/M2 | WEIGHT: 145.5 LBS | DIASTOLIC BLOOD PRESSURE: 74 MMHG | TEMPERATURE: 98 F | SYSTOLIC BLOOD PRESSURE: 93 MMHG | HEIGHT: 68 IN

## 2019-06-15 LAB — GLUCOSE BLDC GLUCOMTR-MCNC: 106 MG/DL (ref 70–99)

## 2019-06-15 PROCEDURE — 99239 HOSP IP/OBS DSCHRG MGMT >30: CPT | Performed by: HOSPITALIST

## 2019-06-15 NOTE — PROGRESS NOTES
[facility] HOSPITALIST  DISCHARGE SUMMARY     Samuel Venegas Patient Status:  Inpatient    1950 MRN [id number]   Location [facility] 4W/SW/SE Attending No att. providers found   [facility] Day # 3 PCP Adolfo Jones MD     Date of Admission: mellitus type 2.   A1c 7.1, will control at home on metformin  -TDD    Renal cell carcinoma, metastatic'sept 2018  Per Dr. Isa Mac related pain following with palliative care as outpatient     H/o HL  Previous heavy alcohol use and smoking    Abn ECG w taking these medications    benzonatate 100 MG Caps  Commonly known as:  TESSALON               Follow-up appointment:   No follow-up provider specified.     Vital signs:  Temp:  [98.1 °F (36.7 °C)-98.9 °F (37.2 °C)] 98.1 °F (36.7 °C)  Pulse:  [] 98

## 2019-06-15 NOTE — PLAN OF CARE
Patient discharged home with daughter and wife. Discharge planning reviewed and questions answered.    Problem: Patient Centered Care  Goal: Patient preferences are identified and integrated in the patient's plan of care  Description  Interventions:  - What additional Care Plan goals for specific interventions   6/15/2019 1245 by Jayne Wild RN  Outcome: Adequate for Discharge  6/15/2019 1243 by Jayne Wild RN  Outcome: Adequate for Discharge     Problem: Delirium  Goal: Minimize duration of delirium Adequate for Discharge  6/15/2019 1243 by Jyoti Dey RN  Outcome: Adequate for Discharge     Problem: SAFETY ADULT - FALL  Goal: Free from fall injury  Description  INTERVENTIONS:  - Assess pt frequently for physical needs  - Identify cognitive and ph

## 2019-06-15 NOTE — PROGRESS NOTES
[facility]    Hematology/Oncology   Progress Note    Menchacasundeep Vidal Patient Status:  Inpatient    1950 MRN [id number]   Location [facility] 4W/SW/SE Attending Taya Dickerson MD   Hosp Day # 2 PCP Vito Dong, There are mild microvascular white matter ischemic changes, likely related to long-standing hypertension and/or diabetes. 3.  Atherosclerosis in the anterior and posterior circulations.  4.  Punctate calcifications in the right and left frontal lobes likely

## 2019-06-15 NOTE — PLAN OF CARE
Problem: Patient Centered Care  Goal: Patient preferences are identified and integrated in the patient's plan of care  Description  Interventions:  - What would you like us to know as we care for you?  Primarily Hebrew speaking  - Provide timely, complet Encourage family to assist in orientation and promotion of home routines  Outcome: Progressing     Problem: PAIN - ADULT  Goal: Verbalizes/displays adequate comfort level or patient's stated pain goal  Description  INTERVENTIONS:  - Encourage pt to monitor style  - Implement communication aides and strategies  - Use visual cues when possible  - Listen attentively, be patient, do not interrupt  - Minimize distractions  - Allow time for understanding and response  - Establish method for patient to ask for assi

## 2019-06-17 ENCOUNTER — PATIENT OUTREACH (OUTPATIENT)
Dept: CASE MANAGEMENT | Age: 69
End: 2019-06-17

## 2019-06-17 DIAGNOSIS — Z02.9 ENCOUNTERS FOR ADMINISTRATIVE PURPOSE: ICD-10-CM

## 2019-06-17 NOTE — PROGRESS NOTES
[facility] [phone] for post hospital follow up, [facility] contact information provided.  TCM book by date 6-

## 2019-06-20 ENCOUNTER — TELEPHONE (OUTPATIENT)
Dept: HEMATOLOGY/ONCOLOGY | Facility: HOSPITAL | Age: 69
End: 2019-06-20

## 2019-06-20 ENCOUNTER — OFFICE VISIT (OUTPATIENT)
Dept: INTERNAL MEDICINE CLINIC | Facility: CLINIC | Age: 69
End: 2019-06-20
Payer: MEDICARE

## 2019-06-20 ENCOUNTER — TELEPHONE (OUTPATIENT)
Dept: OTHER | Age: 69
End: 2019-06-20

## 2019-06-20 VITALS
HEIGHT: 66 IN | DIASTOLIC BLOOD PRESSURE: 80 MMHG | WEIGHT: 145.81 LBS | HEART RATE: 120 BPM | RESPIRATION RATE: 18 BRPM | BODY MASS INDEX: 23.43 KG/M2 | SYSTOLIC BLOOD PRESSURE: 104 MMHG | TEMPERATURE: 99 F

## 2019-06-20 DIAGNOSIS — R94.31 ABNORMAL EKG: ICD-10-CM

## 2019-06-20 DIAGNOSIS — K59.00 CONSTIPATION, UNSPECIFIED CONSTIPATION TYPE: ICD-10-CM

## 2019-06-20 DIAGNOSIS — A41.9 SEPSIS, DUE TO UNSPECIFIED ORGANISM: Primary | ICD-10-CM

## 2019-06-20 DIAGNOSIS — R53.81 PHYSICAL DECONDITIONING: ICD-10-CM

## 2019-06-20 DIAGNOSIS — C64.9 METASTATIC RENAL CELL CARCINOMA, UNSPECIFIED LATERALITY (HCC): ICD-10-CM

## 2019-06-20 PROCEDURE — 99214 OFFICE O/P EST MOD 30 MIN: CPT | Performed by: INTERNAL MEDICINE

## 2019-06-20 PROCEDURE — G0463 HOSPITAL OUTPT CLINIC VISIT: HCPCS | Performed by: INTERNAL MEDICINE

## 2019-06-20 NOTE — PROGRESS NOTES
Several attempts made to reach the patient with no return call. Patient completed HFU on 6/20/2019. Closing encounter.

## 2019-06-20 NOTE — TELEPHONE ENCOUNTER
Pt's daughter stated Pt was seen today but fort to mentioned he received a Jury Duty letter- she was advised need letter from    Requesting a letter stating he can't go due to his health/weakness

## 2019-06-20 NOTE — TELEPHONE ENCOUNTER
[facility] called to say they had reached out to [facility] 3 times and have heard nothing back.  I gave them an alternate number but they wanted to document with the provider

## 2019-06-21 ENCOUNTER — TELEPHONE (OUTPATIENT)
Dept: INTERNAL MEDICINE CLINIC | Facility: CLINIC | Age: 69
End: 2019-06-21

## 2019-06-21 ENCOUNTER — ORDERS FOR ADMISSION (OUTPATIENT)
Dept: INTERNAL MEDICINE CLINIC | Facility: CLINIC | Age: 69
End: 2019-06-21

## 2019-06-21 PROBLEM — R53.81 PHYSICAL DECONDITIONING: Status: ACTIVE | Noted: 2019-06-21

## 2019-06-21 PROBLEM — A41.9 SEPSIS (HCC): Status: ACTIVE | Noted: 2019-06-12

## 2019-06-21 PROBLEM — R50.9 FEVER: Status: RESOLVED | Noted: 2019-06-12 | Resolved: 2019-06-21

## 2019-06-21 PROBLEM — A41.9 SEPSIS (HCC): Status: RESOLVED | Noted: 2019-06-12 | Resolved: 2019-06-21

## 2019-06-21 PROBLEM — R50.9 FEVER, UNSPECIFIED FEVER CAUSE: Status: RESOLVED | Noted: 2019-06-12 | Resolved: 2019-06-21

## 2019-06-21 PROBLEM — K59.00 CONSTIPATION: Status: ACTIVE | Noted: 2019-02-26

## 2019-06-21 PROBLEM — R94.31 ABNORMAL EKG: Status: ACTIVE | Noted: 2019-06-21

## 2019-06-21 NOTE — TELEPHONE ENCOUNTER
Orders per Dr. Joselin Ornelas faxed to [facility].  To state [address] Visits. Fax confirmation received.

## 2019-06-21 NOTE — PROGRESS NOTES
HPI:    Patient ID: Bernarda Vidal is a 71year old male. Patient presents today for posthospital ffup. He was admitted at [facility] after presenting with fevers and mental confusion. He was diagnosed to have sepsis and septic workup done.  He was seen by ID tablet Rfl: 0   METFORMIN  MG Oral Tab TAKE 1 TABLET (850 MG) BY MOUTH DAILY WITH BREAKFAST Disp: 90 tablet Rfl: 1   Prochlorperazine Maleate (COMPAZINE) 10 mg tablet Take 1 tablet (10 mg total) by mouth every 6 (six) hours as needed for Nausea.  Dis visiting nurse given his recent illness/weakness and metastatic renal cell ca.  Will refer pt to [facility].    (C64.9) Metastatic renal cell carcinoma, unspecified laterality ([facility] [address]  Plan: Day 8195        Pt currently under treatment and be

## 2019-06-21 NOTE — TELEPHONE ENCOUNTER
Vianey Cortés called in from [facility] with questions in regards to pt's orders.   Please advise

## 2019-06-25 ENCOUNTER — TELEPHONE (OUTPATIENT)
Dept: INTERNAL MEDICINE CLINIC | Facility: CLINIC | Age: 69
End: 2019-06-25

## 2019-06-25 DIAGNOSIS — E78.2 MIXED HYPERLIPIDEMIA: ICD-10-CM

## 2019-06-25 RX ORDER — ATORVASTATIN CALCIUM 20 MG/1
20 TABLET, FILM COATED ORAL NIGHTLY
Qty: 90 TABLET | Refills: 0 | Status: SHIPPED | OUTPATIENT
Start: 2019-06-25

## 2019-06-30 ENCOUNTER — SNF/IP PROF CHARGE ONLY (OUTPATIENT)
Dept: HEMATOLOGY/ONCOLOGY | Facility: HOSPITAL | Age: 69
End: 2019-06-30

## 2019-06-30 DIAGNOSIS — C64.9 METASTATIC RENAL CELL CARCINOMA, UNSPECIFIED LATERALITY (HCC): ICD-10-CM

## 2019-06-30 PROCEDURE — G9678 ONCOLOGY CARE MODEL SERVICE: HCPCS | Performed by: INTERNAL MEDICINE

## 2019-07-06 RX ORDER — LORAZEPAM 2 MG/1
TABLET ORAL
Qty: 45 TABLET | Refills: 1 | OUTPATIENT
Start: 2019-07-06 | End: 2019-10-29

## 2019-07-06 NOTE — TELEPHONE ENCOUNTER
Controlled medications pending for review. If approved needs to be called in or faxed by on site staff.     Last Rx: 4-27-19 #  45 + 1  LOV: 6-20-19    Requested Prescriptions     Pending Prescriptions Disp Refills   • LORazepam 2 MG Oral Tab 45 tablet 1

## 2019-07-06 NOTE — TELEPHONE ENCOUNTER
Called [facility] pharmacy and left voicemail with rx approval for Lorazepam 2mg #45 as authorized by Dr. Lizette [place].

## 2019-07-10 ENCOUNTER — NURSE ONLY (OUTPATIENT)
Dept: HEMATOLOGY/ONCOLOGY | Facility: HOSPITAL | Age: 69
End: 2019-07-10
Attending: INTERNAL MEDICINE
Payer: MEDICARE

## 2019-07-10 ENCOUNTER — OFFICE VISIT (OUTPATIENT)
Dept: HEMATOLOGY/ONCOLOGY | Facility: HOSPITAL | Age: 69
End: 2019-07-10
Attending: NURSE PRACTITIONER
Payer: MEDICARE

## 2019-07-10 ENCOUNTER — TELEPHONE (OUTPATIENT)
Dept: HEMATOLOGY/ONCOLOGY | Facility: HOSPITAL | Age: 69
End: 2019-07-10

## 2019-07-10 VITALS
OXYGEN SATURATION: 97 % | HEART RATE: 98 BPM | HEIGHT: 66 IN | BODY MASS INDEX: 22.82 KG/M2 | WEIGHT: 142 LBS | SYSTOLIC BLOOD PRESSURE: 108 MMHG | DIASTOLIC BLOOD PRESSURE: 72 MMHG | RESPIRATION RATE: 16 BRPM | TEMPERATURE: 98 F

## 2019-07-10 VITALS
TEMPERATURE: 98 F | BODY MASS INDEX: 22.82 KG/M2 | OXYGEN SATURATION: 97 % | DIASTOLIC BLOOD PRESSURE: 72 MMHG | RESPIRATION RATE: 16 BRPM | WEIGHT: 142 LBS | SYSTOLIC BLOOD PRESSURE: 108 MMHG | HEART RATE: 98 BPM | HEIGHT: 66 IN

## 2019-07-10 DIAGNOSIS — K59.03 THERAPEUTIC OPIOID INDUCED CONSTIPATION: ICD-10-CM

## 2019-07-10 DIAGNOSIS — R11.0 NAUSEA: ICD-10-CM

## 2019-07-10 DIAGNOSIS — C64.9 METASTATIC RENAL CELL CARCINOMA, UNSPECIFIED LATERALITY (HCC): Primary | ICD-10-CM

## 2019-07-10 DIAGNOSIS — E87.1 HYPONATREMIA: ICD-10-CM

## 2019-07-10 DIAGNOSIS — C64.9 METASTATIC RENAL CELL CARCINOMA, UNSPECIFIED LATERALITY (HCC): ICD-10-CM

## 2019-07-10 DIAGNOSIS — G89.3 CANCER RELATED PAIN: Primary | ICD-10-CM

## 2019-07-10 DIAGNOSIS — R63.4 WEIGHT LOSS: ICD-10-CM

## 2019-07-10 DIAGNOSIS — C78.00 MALIGNANT NEOPLASM METASTATIC TO LUNG, UNSPECIFIED LATERALITY (HCC): ICD-10-CM

## 2019-07-10 DIAGNOSIS — Z51.11 CHEMOTHERAPY MANAGEMENT, ENCOUNTER FOR: ICD-10-CM

## 2019-07-10 DIAGNOSIS — D64.9 ANEMIA, UNSPECIFIED TYPE: ICD-10-CM

## 2019-07-10 DIAGNOSIS — R63.0 DECREASED APPETITE: ICD-10-CM

## 2019-07-10 DIAGNOSIS — T40.2X5A THERAPEUTIC OPIOID INDUCED CONSTIPATION: ICD-10-CM

## 2019-07-10 LAB
ALBUMIN SERPL-MCNC: 2.9 G/DL (ref 3.4–5)
ALBUMIN/GLOB SERPL: 0.5 {RATIO} (ref 1–2)
ALP LIVER SERPL-CCNC: 136 U/L (ref 45–117)
ALT SERPL-CCNC: 35 U/L (ref 16–61)
ANION GAP SERPL CALC-SCNC: 6 MMOL/L (ref 0–18)
AST SERPL-CCNC: 31 U/L (ref 15–37)
BASOPHILS # BLD AUTO: 0.03 X10(3) UL (ref 0–0.2)
BASOPHILS NFR BLD AUTO: 0.9 %
BILIRUB SERPL-MCNC: 0.4 MG/DL (ref 0.1–2)
BUN BLD-MCNC: 11 MG/DL (ref 7–18)
BUN/CREAT SERPL: 15.1 (ref 10–20)
CALCIUM BLD-MCNC: 8.4 MG/DL (ref 8.5–10.1)
CHLORIDE SERPL-SCNC: 100 MMOL/L (ref 98–112)
CO2 SERPL-SCNC: 28 MMOL/L (ref 21–32)
CREAT BLD-MCNC: 0.73 MG/DL (ref 0.7–1.3)
DEPRECATED RDW RBC AUTO: 54.7 FL (ref 35.1–46.3)
EOSINOPHIL # BLD AUTO: 0.04 X10(3) UL (ref 0–0.7)
EOSINOPHIL NFR BLD AUTO: 1.2 %
ERYTHROCYTE [DISTWIDTH] IN BLOOD BY AUTOMATED COUNT: 18.6 % (ref 11–15)
GLOBULIN PLAS-MCNC: 5.4 G/DL (ref 2.8–4.4)
GLUCOSE BLD-MCNC: 122 MG/DL (ref 70–99)
HCT VFR BLD AUTO: 42.2 % (ref 39–53)
HGB BLD-MCNC: 13.5 G/DL (ref 13–17.5)
IMM GRANULOCYTES # BLD AUTO: 0.01 X10(3) UL (ref 0–1)
IMM GRANULOCYTES NFR BLD: 0.3 %
LYMPHOCYTES # BLD AUTO: 0.73 X10(3) UL (ref 1–4)
LYMPHOCYTES NFR BLD AUTO: 21 %
M PROTEIN MFR SERPL ELPH: 8.3 G/DL (ref 6.4–8.2)
MCH RBC QN AUTO: 27.4 PG (ref 26–34)
MCHC RBC AUTO-ENTMCNC: 32 G/DL (ref 31–37)
MCV RBC AUTO: 85.8 FL (ref 80–100)
MONOCYTES # BLD AUTO: 0.31 X10(3) UL (ref 0.1–1)
MONOCYTES NFR BLD AUTO: 8.9 %
NEUTROPHILS # BLD AUTO: 2.35 X10 (3) UL (ref 1.5–7.7)
NEUTROPHILS # BLD AUTO: 2.35 X10(3) UL (ref 1.5–7.7)
NEUTROPHILS NFR BLD AUTO: 67.7 %
OSMOLALITY SERPL CALC.SUM OF ELEC: 279 MOSM/KG (ref 275–295)
PATIENT FASTING: NO
PLATELET # BLD AUTO: 122 10(3)UL (ref 150–450)
POTASSIUM SERPL-SCNC: 4.8 MMOL/L (ref 3.5–5.1)
RBC # BLD AUTO: 4.92 X10(6)UL (ref 3.8–5.8)
SODIUM SERPL-SCNC: 134 MMOL/L (ref 136–145)
WBC # BLD AUTO: 3.5 X10(3) UL (ref 4–11)

## 2019-07-10 PROCEDURE — 80053 COMPREHEN METABOLIC PANEL: CPT

## 2019-07-10 PROCEDURE — 85025 COMPLETE CBC W/AUTO DIFF WBC: CPT

## 2019-07-10 PROCEDURE — 36415 COLL VENOUS BLD VENIPUNCTURE: CPT

## 2019-07-10 PROCEDURE — 99215 OFFICE O/P EST HI 40 MIN: CPT | Performed by: INTERNAL MEDICINE

## 2019-07-10 PROCEDURE — 99214 OFFICE O/P EST MOD 30 MIN: CPT | Performed by: NURSE PRACTITIONER

## 2019-07-10 RX ORDER — HYDROCODONE BITARTRATE AND ACETAMINOPHEN 5; 325 MG/1; MG/1
1-2 TABLET ORAL
Qty: 180 TABLET | Refills: 0 | Status: SHIPPED | OUTPATIENT
Start: 2019-07-10 | End: 2019-08-07

## 2019-07-10 RX ORDER — SENNA AND DOCUSATE SODIUM 50; 8.6 MG/1; MG/1
2 TABLET, FILM COATED ORAL NIGHTLY
Qty: 60 TABLET | Refills: 3 | Status: SHIPPED | OUTPATIENT
Start: 2019-07-10

## 2019-07-10 RX ORDER — ONDANSETRON 8 MG/1
8 TABLET, ORALLY DISINTEGRATING ORAL EVERY 8 HOURS PRN
Qty: 30 TABLET | Refills: 3 | Status: SHIPPED | OUTPATIENT
Start: 2019-07-10

## 2019-07-10 NOTE — PATIENT INSTRUCTIONS
-Please call Joanne Rivera with any Palliative Care concerns/questions @ [phone]     -Rx for Norco 5/325mg provided today- take 1-2 tabs every 4-6 hours as needed for pain     -May take ibuprofen 400mg-600mg (OTC) every 6-8 hours as needed     -Drink 1-2 Ensure

## 2019-07-10 NOTE — PROGRESS NOTES
Palliative Care Follow Up Note     Patient Name: Suzan Peacock   YOB: 1950   Medical Record Number: [id number]   Date of visit: 7/10/19    Chief Complaint/Reason for Visit:  Patient presents with:  Palliative Care       History of Present History:   Procedure Laterality Date   • AMPUTATION FINGER/THUMB Right 1965    traumatic, x 3   • WRIST FRACTURE SURGERY Left 2003       Allergies:  No Known Allergies    Family History:  Family History   Problem Relation Age of Onset   • Cancer Sister Take 1 tablet (10 mg total) by mouth every 6 (six) hours as needed for Nausea. Disp: 60 tablet Rfl: 3   ASPIRIN OR Take 81 mg by mouth daily.    Disp:  Rfl:        Review of Systems:  Review of Systems   Constitutional: Positive for malaise/fatigue and weig and time. He appears well-developed. No distress. Croatian-speaking male,thin, chronically-ill appearing   HENT:   Head: Normocephalic and atraumatic. Eyes: Pupils are equal, round, and reactive to light. EOM are normal.   Neck: Normal range of motion. unspecified laterality ([facility] [address]    7.  Malignant neoplasm metastatic to lung, unspecified laterality (HCC)    Cancer Related Pain  -Discussed addiction vs tolerance  -Controlled Substance Agreement reviewed with pt/family today and signed by pt during initial care of your patient. I will continue to follow clinically.     Follow up in 4 weeks    MARIBEL Fournier [facility]  [phone]  7/10/19

## 2019-07-10 NOTE — PROGRESS NOTES
Cancer Center Progress Note    Patient Name: Anna Serrano   YOB: 1950   Medical Record Number: [id number]   Attending Physician: Amari Baker M.D.        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      Laboratory:  Recent Labs   Lab 04/22/19  0750   WBC 2.7*   HGB 10.0*   .0   NE 1.81           Lab Results   Component Value Date     (H) 07/10/2019    BUN 11 07/10/2019    BUNCREA 15.1 07/10/2019    CREATSERUM 0.73 07/10/2019    ANIO

## 2019-07-10 NOTE — TELEPHONE ENCOUNTER
[facility] called to say that they have tried to reach wendy on both numbers multiple times and have not been able to reach him.  They will continue to try

## 2019-07-12 ENCOUNTER — TELEPHONE (OUTPATIENT)
Dept: INTERNAL MEDICINE CLINIC | Facility: CLINIC | Age: 69
End: 2019-07-12

## 2019-07-16 NOTE — TELEPHONE ENCOUNTER
Order # [id number] completed by Dr. Booker Garner, faxed back and confirmed sent. Original placed for scanning to chart.

## 2019-07-18 NOTE — TELEPHONE ENCOUNTER
Called daughter that [employer] [employer] was trying to get a hold of them,  Gave her their phone number of [address]. She verbalizes understanding and will call them.

## 2019-08-03 ENCOUNTER — HOSPITAL ENCOUNTER (OUTPATIENT)
Dept: CT IMAGING | Age: 69
Discharge: HOME | End: 2019-08-03
Attending: INTERNAL MEDICINE
Payer: MEDICARE

## 2019-08-03 DIAGNOSIS — C64.9 METASTATIC RENAL CELL CARCINOMA, UNSPECIFIED LATERALITY (HCC): ICD-10-CM

## 2019-08-03 PROCEDURE — 71260 CT THORAX DX C+: CPT | Performed by: INTERNAL MEDICINE

## 2019-08-03 PROCEDURE — 74177 CT ABD & PELVIS W/CONTRAST: CPT | Performed by: INTERNAL MEDICINE

## 2019-08-07 ENCOUNTER — NURSE ONLY (OUTPATIENT)
Dept: HEMATOLOGY/ONCOLOGY | Facility: HOSPITAL | Age: 69
End: 2019-08-07
Attending: NURSE PRACTITIONER
Payer: MEDICARE

## 2019-08-07 ENCOUNTER — OFFICE VISIT (OUTPATIENT)
Dept: HEMATOLOGY/ONCOLOGY | Facility: HOSPITAL | Age: 69
End: 2019-08-07
Attending: INTERNAL MEDICINE
Payer: MEDICARE

## 2019-08-07 VITALS
WEIGHT: 142 LBS | BODY MASS INDEX: 22.82 KG/M2 | DIASTOLIC BLOOD PRESSURE: 80 MMHG | RESPIRATION RATE: 18 BRPM | SYSTOLIC BLOOD PRESSURE: 135 MMHG | HEIGHT: 66 IN | HEART RATE: 112 BPM | TEMPERATURE: 98 F | OXYGEN SATURATION: 97 %

## 2019-08-07 DIAGNOSIS — Z51.11 CHEMOTHERAPY MANAGEMENT, ENCOUNTER FOR: ICD-10-CM

## 2019-08-07 DIAGNOSIS — L27.1 PALMAR PLANTAR ERYTHRODYSAESTHESIA: ICD-10-CM

## 2019-08-07 DIAGNOSIS — R63.4 WEIGHT LOSS: ICD-10-CM

## 2019-08-07 DIAGNOSIS — D64.9 ANEMIA, UNSPECIFIED TYPE: ICD-10-CM

## 2019-08-07 DIAGNOSIS — C78.00 MALIGNANT NEOPLASM METASTATIC TO LUNG, UNSPECIFIED LATERALITY (HCC): ICD-10-CM

## 2019-08-07 DIAGNOSIS — C64.9 METASTATIC RENAL CELL CARCINOMA, UNSPECIFIED LATERALITY (HCC): ICD-10-CM

## 2019-08-07 DIAGNOSIS — T40.2X5A THERAPEUTIC OPIOID INDUCED CONSTIPATION: ICD-10-CM

## 2019-08-07 DIAGNOSIS — G89.3 CANCER RELATED PAIN: ICD-10-CM

## 2019-08-07 DIAGNOSIS — G89.3 CANCER RELATED PAIN: Primary | ICD-10-CM

## 2019-08-07 DIAGNOSIS — R63.0 DECREASED APPETITE: ICD-10-CM

## 2019-08-07 DIAGNOSIS — C64.9 METASTATIC RENAL CELL CARCINOMA, UNSPECIFIED LATERALITY (HCC): Primary | ICD-10-CM

## 2019-08-07 DIAGNOSIS — K59.03 THERAPEUTIC OPIOID INDUCED CONSTIPATION: ICD-10-CM

## 2019-08-07 LAB
ALBUMIN SERPL-MCNC: 3.2 G/DL (ref 3.4–5)
ALBUMIN/GLOB SERPL: 0.6 {RATIO} (ref 1–2)
ALP LIVER SERPL-CCNC: 105 U/L (ref 45–117)
ALT SERPL-CCNC: 39 U/L (ref 16–61)
ANION GAP SERPL CALC-SCNC: 5 MMOL/L (ref 0–18)
AST SERPL-CCNC: 31 U/L (ref 15–37)
BASOPHILS # BLD AUTO: 0.01 X10(3) UL (ref 0–0.2)
BASOPHILS NFR BLD AUTO: 0.4 %
BILIRUB SERPL-MCNC: 0.6 MG/DL (ref 0.1–2)
BUN BLD-MCNC: 10 MG/DL (ref 7–18)
BUN/CREAT SERPL: 12.8 (ref 10–20)
CALCIUM BLD-MCNC: 8.2 MG/DL (ref 8.5–10.1)
CHLORIDE SERPL-SCNC: 102 MMOL/L (ref 98–112)
CO2 SERPL-SCNC: 28 MMOL/L (ref 21–32)
CREAT BLD-MCNC: 0.78 MG/DL (ref 0.7–1.3)
DEPRECATED RDW RBC AUTO: 73 FL (ref 35.1–46.3)
EOSINOPHIL # BLD AUTO: 0.03 X10(3) UL (ref 0–0.7)
EOSINOPHIL NFR BLD AUTO: 1.1 %
ERYTHROCYTE [DISTWIDTH] IN BLOOD BY AUTOMATED COUNT: 22.5 % (ref 11–15)
GLOBULIN PLAS-MCNC: 5 G/DL (ref 2.8–4.4)
GLUCOSE BLD-MCNC: 113 MG/DL (ref 70–99)
HCT VFR BLD AUTO: 40.7 % (ref 39–53)
HGB BLD-MCNC: 13.3 G/DL (ref 13–17.5)
IMM GRANULOCYTES # BLD AUTO: 0.01 X10(3) UL (ref 0–1)
IMM GRANULOCYTES NFR BLD: 0.4 %
LYMPHOCYTES # BLD AUTO: 0.8 X10(3) UL (ref 1–4)
LYMPHOCYTES NFR BLD AUTO: 28.2 %
M PROTEIN MFR SERPL ELPH: 8.2 G/DL (ref 6.4–8.2)
MCH RBC QN AUTO: 29.7 PG (ref 26–34)
MCHC RBC AUTO-ENTMCNC: 32.7 G/DL (ref 31–37)
MCV RBC AUTO: 90.8 FL (ref 80–100)
MONOCYTES # BLD AUTO: 0.23 X10(3) UL (ref 0.1–1)
MONOCYTES NFR BLD AUTO: 8.1 %
NEUTROPHILS # BLD AUTO: 1.76 X10 (3) UL (ref 1.5–7.7)
NEUTROPHILS # BLD AUTO: 1.76 X10(3) UL (ref 1.5–7.7)
NEUTROPHILS NFR BLD AUTO: 61.8 %
OSMOLALITY SERPL CALC.SUM OF ELEC: 280 MOSM/KG (ref 275–295)
PATIENT FASTING: NO
PLATELET # BLD AUTO: 121 10(3)UL (ref 150–450)
PLATELET MORPHOLOGY: NORMAL
POTASSIUM SERPL-SCNC: 4.1 MMOL/L (ref 3.5–5.1)
RBC # BLD AUTO: 4.48 X10(6)UL (ref 3.8–5.8)
SODIUM SERPL-SCNC: 135 MMOL/L (ref 136–145)
WBC # BLD AUTO: 2.8 X10(3) UL (ref 4–11)

## 2019-08-07 PROCEDURE — 80053 COMPREHEN METABOLIC PANEL: CPT

## 2019-08-07 PROCEDURE — 99215 OFFICE O/P EST HI 40 MIN: CPT | Performed by: INTERNAL MEDICINE

## 2019-08-07 PROCEDURE — 36415 COLL VENOUS BLD VENIPUNCTURE: CPT

## 2019-08-07 PROCEDURE — 85025 COMPLETE CBC W/AUTO DIFF WBC: CPT

## 2019-08-07 PROCEDURE — 99214 OFFICE O/P EST MOD 30 MIN: CPT | Performed by: NURSE PRACTITIONER

## 2019-08-07 RX ORDER — MORPHINE SULFATE 15 MG/1
15 TABLET, FILM COATED, EXTENDED RELEASE ORAL EVERY 12 HOURS SCHEDULED
Qty: 60 TABLET | Refills: 0 | Status: SHIPPED | OUTPATIENT
Start: 2019-08-07 | End: 2019-09-04

## 2019-08-07 RX ORDER — HYDROCODONE BITARTRATE AND ACETAMINOPHEN 5; 325 MG/1; MG/1
1-2 TABLET ORAL
Qty: 180 TABLET | Refills: 0 | Status: SHIPPED | OUTPATIENT
Start: 2019-08-07 | End: 2019-09-04

## 2019-08-07 NOTE — PATIENT INSTRUCTIONS
-Start taking Morphine Sulfate (long acting) 15mg at 9am and 9pm- do not skip doses    -Continue Norco 5/325mg- take 1-2 tabs every 6 hours as needed for breakthrough cancer pain    -Start Senna-S (over the counter) 2 tabs at bedtime to prevent constipatio

## 2019-08-07 NOTE — PROGRESS NOTES
Cancer Center Progress Note    Patient Name: Kayla Gray   YOB: 1950   Medical Record Number: [id number]   Attending Physician: Fredrick Escudero M.D.        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    H education: Not on file      Highest education level: Not on file    Occupational History      Occupation: [employer]        Comment: retired    Social Needs      Financial resource strain: Not on file      Food insecurity:        Worry: Not on file        Jeanne Not on file        Current Medications:    Current Outpatient Medications:   •  HYDROcodone-acetaminophen 5-325 MG Oral Tab, Take 1-2 tablets by mouth every 4 to 6 hours as needed for Pain., Disp: 180 tablet, Rfl: 0  •  Senna-Docusate Sodium 8.6-50 MG Ora 04/22/19  0750   WBC 2.7*   HGB 10.0*   .0   NE 1.81           Lab Results   Component Value Date     (H) 08/07/2019    BUN 10 08/07/2019    BUNCREA 12.8 08/07/2019    CREATSERUM 0.78 08/07/2019    ANIONGAP 5 08/07/2019    GFRNAA 92 08/07/201

## 2019-08-07 NOTE — PROGRESS NOTES
Palliative Care Follow Up Note     Patient Name: Saravanan Rojas   YOB: 1950   Medical Record Number: [id number]   Date of visit: 8/7/19    Chief Complaint/Reason for Visit:  Patient presents with:  Palliative Care       History of Present I History:  Social History    Socioeconomic History      Marital status:       Spouse name: Not on file      Number of children: 3      Years of education: Not on file      Highest education level: Not on file    Occupational History      Occupation: Review of Systems:  Review of Systems   Constitutional: Positive for malaise/fatigue and weight loss. Negative for chills, diaphoresis and fever.         Patient reports decreased appetite, but still tried to eat at least 2 meals and 1-2 supplemental Eyes: Pupils are equal, round, and reactive to light. EOM are normal.   Neck: Normal range of motion. Neck supple. Pulmonary/Chest: Effort normal. No respiratory distress. Abdominal: Soft. He exhibits no distension. There is no tenderness.    Musculos   -Discussed MOA and explained side effects of pain medications  -Rx provided for Norco 5/325 1-2 tabs Q4-6H/PRN for pain (prescription provided today for #180 pills)  -Will add long acting Morphine Sulfate 15mg BID (prescription provided today for # 60

## 2019-08-22 ENCOUNTER — TELEPHONE (OUTPATIENT)
Dept: INTERNAL MEDICINE CLINIC | Facility: CLINIC | Age: 69
End: 2019-08-22

## 2019-08-22 NOTE — TELEPHONE ENCOUNTER
[facility] is informing Dr. Peggy Palomo Pt has been discharge.   Hasn't seen pt since 7/19/19    Please advise

## 2019-08-31 ENCOUNTER — SNF/IP PROF CHARGE ONLY (OUTPATIENT)
Dept: HEMATOLOGY/ONCOLOGY | Facility: HOSPITAL | Age: 69
End: 2019-08-31

## 2019-08-31 DIAGNOSIS — C64.9 METASTATIC RENAL CELL CARCINOMA, UNSPECIFIED LATERALITY (HCC): ICD-10-CM

## 2019-08-31 PROCEDURE — G9678 ONCOLOGY CARE MODEL SERVICE: HCPCS | Performed by: INTERNAL MEDICINE

## 2019-09-04 ENCOUNTER — APPOINTMENT (OUTPATIENT)
Dept: HEMATOLOGY/ONCOLOGY | Facility: HOSPITAL | Age: 69
End: 2019-09-04
Attending: NURSE PRACTITIONER
Payer: MEDICARE

## 2019-09-04 VITALS
HEART RATE: 115 BPM | TEMPERATURE: 99 F | BODY MASS INDEX: 23.46 KG/M2 | SYSTOLIC BLOOD PRESSURE: 117 MMHG | RESPIRATION RATE: 18 BRPM | OXYGEN SATURATION: 100 % | DIASTOLIC BLOOD PRESSURE: 77 MMHG | WEIGHT: 146 LBS | HEIGHT: 66 IN

## 2019-09-04 DIAGNOSIS — C78.00 MALIGNANT NEOPLASM METASTATIC TO LUNG, UNSPECIFIED LATERALITY (HCC): ICD-10-CM

## 2019-09-04 DIAGNOSIS — C64.9 METASTATIC RENAL CELL CARCINOMA, UNSPECIFIED LATERALITY (HCC): ICD-10-CM

## 2019-09-04 DIAGNOSIS — T40.2X5A THERAPEUTIC OPIOID INDUCED CONSTIPATION: ICD-10-CM

## 2019-09-04 DIAGNOSIS — L27.1 PALMAR PLANTAR ERYTHRODYSAESTHESIA: ICD-10-CM

## 2019-09-04 DIAGNOSIS — C64.9 METASTATIC RENAL CELL CARCINOMA, UNSPECIFIED LATERALITY (HCC): Primary | ICD-10-CM

## 2019-09-04 DIAGNOSIS — G89.3 CANCER RELATED PAIN: Primary | ICD-10-CM

## 2019-09-04 DIAGNOSIS — Z51.11 CHEMOTHERAPY MANAGEMENT, ENCOUNTER FOR: ICD-10-CM

## 2019-09-04 DIAGNOSIS — K59.03 THERAPEUTIC OPIOID INDUCED CONSTIPATION: ICD-10-CM

## 2019-09-04 LAB
ALBUMIN SERPL-MCNC: 3 G/DL (ref 3.4–5)
ALBUMIN/GLOB SERPL: 0.6 {RATIO} (ref 1–2)
ALP LIVER SERPL-CCNC: 95 U/L (ref 45–117)
ALT SERPL-CCNC: 26 U/L (ref 16–61)
ANION GAP SERPL CALC-SCNC: 6 MMOL/L (ref 0–18)
AST SERPL-CCNC: 24 U/L (ref 15–37)
BASOPHILS # BLD AUTO: 0.02 X10(3) UL (ref 0–0.2)
BASOPHILS NFR BLD AUTO: 0.7 %
BILIRUB SERPL-MCNC: 0.5 MG/DL (ref 0.1–2)
BUN BLD-MCNC: 9 MG/DL (ref 7–18)
BUN/CREAT SERPL: 11.1 (ref 10–20)
CALCIUM BLD-MCNC: 8 MG/DL (ref 8.5–10.1)
CHLORIDE SERPL-SCNC: 102 MMOL/L (ref 98–112)
CO2 SERPL-SCNC: 28 MMOL/L (ref 21–32)
CREAT BLD-MCNC: 0.81 MG/DL (ref 0.7–1.3)
DEPRECATED RDW RBC AUTO: 77.4 FL (ref 35.1–46.3)
EOSINOPHIL # BLD AUTO: 0.03 X10(3) UL (ref 0–0.7)
EOSINOPHIL NFR BLD AUTO: 1.1 %
ERYTHROCYTE [DISTWIDTH] IN BLOOD BY AUTOMATED COUNT: 22.2 % (ref 11–15)
GLOBULIN PLAS-MCNC: 4.7 G/DL (ref 2.8–4.4)
GLUCOSE BLD-MCNC: 158 MG/DL (ref 70–99)
HCT VFR BLD AUTO: 36.9 % (ref 39–53)
HGB BLD-MCNC: 11.8 G/DL (ref 13–17.5)
IMM GRANULOCYTES # BLD AUTO: 0 X10(3) UL (ref 0–1)
IMM GRANULOCYTES NFR BLD: 0 %
LYMPHOCYTES # BLD AUTO: 0.83 X10(3) UL (ref 1–4)
LYMPHOCYTES NFR BLD AUTO: 29.2 %
M PROTEIN MFR SERPL ELPH: 7.7 G/DL (ref 6.4–8.2)
MCH RBC QN AUTO: 30.3 PG (ref 26–34)
MCHC RBC AUTO-ENTMCNC: 32 G/DL (ref 31–37)
MCV RBC AUTO: 94.9 FL (ref 80–100)
MONOCYTES # BLD AUTO: 0.3 X10(3) UL (ref 0.1–1)
MONOCYTES NFR BLD AUTO: 10.6 %
NEUTROPHILS # BLD AUTO: 1.66 X10 (3) UL (ref 1.5–7.7)
NEUTROPHILS # BLD AUTO: 1.66 X10(3) UL (ref 1.5–7.7)
NEUTROPHILS NFR BLD AUTO: 58.4 %
OSMOLALITY SERPL CALC.SUM OF ELEC: 284 MOSM/KG (ref 275–295)
PATIENT FASTING: NO
PLATELET # BLD AUTO: 106 10(3)UL (ref 150–450)
PLATELET MORPHOLOGY: NORMAL
POTASSIUM SERPL-SCNC: 4 MMOL/L (ref 3.5–5.1)
RBC # BLD AUTO: 3.89 X10(6)UL (ref 3.8–5.8)
SODIUM SERPL-SCNC: 136 MMOL/L (ref 136–145)
WBC # BLD AUTO: 2.8 X10(3) UL (ref 4–11)

## 2019-09-04 PROCEDURE — 36415 COLL VENOUS BLD VENIPUNCTURE: CPT

## 2019-09-04 PROCEDURE — 85025 COMPLETE CBC W/AUTO DIFF WBC: CPT

## 2019-09-04 PROCEDURE — 99214 OFFICE O/P EST MOD 30 MIN: CPT | Performed by: NURSE PRACTITIONER

## 2019-09-04 PROCEDURE — 80053 COMPREHEN METABOLIC PANEL: CPT

## 2019-09-04 PROCEDURE — 99215 OFFICE O/P EST HI 40 MIN: CPT | Performed by: INTERNAL MEDICINE

## 2019-09-04 RX ORDER — HYDROCODONE BITARTRATE AND ACETAMINOPHEN 5; 325 MG/1; MG/1
1-2 TABLET ORAL
Qty: 180 TABLET | Refills: 0 | Status: SHIPPED | OUTPATIENT
Start: 2019-09-04 | End: 2019-10-03

## 2019-09-04 NOTE — PROGRESS NOTES
Palliative Care Follow Up Note     Patient Name: Gallito Jordan   YOB: 1950   Medical Record Number: [id number]   Date of visit: 9/4/19    Chief Complaint/Reason for Visit:  Patient presents with:  Palliative Care       History of Present I History:  Social History    Socioeconomic History      Marital status:       Spouse name: Not on file      Number of children: 3      Years of education: Not on file      Highest education level: Not on file    Occupational History      Occupation: fever. Patient reports decreased appetite, but still tried to eat at least 2 meals and 1-2 supplemental protein shakes    3 pound weight gain since last visit per Epic    Completed RT        HENT: Negative.   Negative for congestion, ear discharge, e normal. No respiratory distress. Abdominal: Soft. He exhibits no distension. There is no tenderness. Musculoskeletal: Normal range of motion. He exhibits no edema.    Multiple amputations to right hand noted 6 status post traumatic injury in the 1960s Q6-8H/PRN for added pain control        Therapeutic opioid induced constipation  -Provided CAP-C constipation algorithim during initial visit  -Continue OTC stool softeners daily (Pt states this is effective for daily BMs)  -Start Senna-S 1-2 tabs at bedti

## 2019-09-04 NOTE — PROGRESS NOTES
Cancer Center Progress Note    Patient Name: Kayla Gray   YOB: 1950   Medical Record Number: [id number]   Attending Physician: Fredrick Escudero M.D.        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    H education: Not on file      Highest education level: Not on file    Occupational History      Occupation: [employer]        Comment: retired    Social Needs      Financial resource strain: Not on file      Food insecurity:        Worry: Not on file        Jeanne Not on file        Current Medications:    Current Outpatient Medications:   •  HYDROcodone-acetaminophen 5-325 MG Oral Tab, Take 1-2 tablets by mouth every 4 to 6 hours as needed for Pain., Disp: 180 tablet, Rfl: 0  •  Senna-Docusate Sodium 8.6-50 MG Ora 04/22/19  0750   WBC 2.7*   HGB 10.0*   .0   NE 1.81           Lab Results   Component Value Date     (H) 09/04/2019    BUN 9 09/04/2019    BUNCREA 11.1 09/04/2019    CREATSERUM 0.81 09/04/2019    ANIONGAP 6 09/04/2019    GFRNAA 91 09/04/2019

## 2019-09-06 ENCOUNTER — APPOINTMENT (OUTPATIENT)
Dept: HEMATOLOGY/ONCOLOGY | Facility: HOSPITAL | Age: 69
End: 2019-09-06
Attending: NURSE PRACTITIONER
Payer: MEDICARE

## 2019-09-16 NOTE — DISCHARGE SUMMARY
[place] HOSPITALIST  DISCHARGE SUMMARY     Rosaura Barton Patient Status:  Inpatient    1950 MRN [id number]   Location [facility] 4W/SW/SE Attending No att. providers found   [facility] Day # 3 PCP Jayme Forbes MD     Date of Admission: now  .  Diabetes mellitus type 2.  A1c 7.1, will control at home on metformin  -TDD    Renal cell carcinoma, metastatic'sept 2018  Per Dr. Isa Mac related pain following with palliative care as outpatient     H/o HL  Previous heavy alcohol use and smo guarding. Neurologic: No focal neurological deficits. Musculoskeletal: Moves all extremities. Extremities: No edema.   -----------------------------------------------------------------------------------------------  PATIENT DISCHARGE INSTRUCTIONS: See e

## 2019-09-17 NOTE — TELEPHONE ENCOUNTER
Refill passed per [facility], [facility] protocol.   Diabetes Medications  Protocol Criteria:  · Appointment scheduled in the past 6 months or the next 3 months  · A1C < 7.5 in the past 6 months  · Creatinine in the past 12 months  · Creatinine result < 1.5   Rece

## 2019-09-20 ENCOUNTER — TELEPHONE (OUTPATIENT)
Dept: HEMATOLOGY/ONCOLOGY | Facility: HOSPITAL | Age: 69
End: 2019-09-20

## 2019-09-20 NOTE — TELEPHONE ENCOUNTER
[facility] TO RESCHEDULE 3 APPT ON 10/3 TO LATER TIME DR ATKINSON SCHEDULE BEGINS AT 12. [address]35 [facility]

## 2019-09-30 ENCOUNTER — SNF/IP PROF CHARGE ONLY (OUTPATIENT)
Dept: HEMATOLOGY/ONCOLOGY | Facility: HOSPITAL | Age: 69
End: 2019-09-30

## 2019-09-30 DIAGNOSIS — C64.9 METASTATIC RENAL CELL CARCINOMA, UNSPECIFIED LATERALITY (HCC): ICD-10-CM

## 2019-09-30 PROCEDURE — G9678 ONCOLOGY CARE MODEL SERVICE: HCPCS | Performed by: INTERNAL MEDICINE

## 2019-10-03 ENCOUNTER — APPOINTMENT (OUTPATIENT)
Dept: HEMATOLOGY/ONCOLOGY | Facility: HOSPITAL | Age: 69
End: 2019-10-03
Attending: NURSE PRACTITIONER
Payer: MEDICARE

## 2019-10-03 ENCOUNTER — OFFICE VISIT (OUTPATIENT)
Dept: HEMATOLOGY/ONCOLOGY | Facility: HOSPITAL | Age: 69
End: 2019-10-03
Attending: INTERNAL MEDICINE
Payer: MEDICARE

## 2019-10-03 VITALS
DIASTOLIC BLOOD PRESSURE: 94 MMHG | TEMPERATURE: 98 F | SYSTOLIC BLOOD PRESSURE: 108 MMHG | BODY MASS INDEX: 23.59 KG/M2 | HEART RATE: 102 BPM | HEIGHT: 66 IN | OXYGEN SATURATION: 98 % | RESPIRATION RATE: 18 BRPM | WEIGHT: 146.81 LBS

## 2019-10-03 DIAGNOSIS — C64.9 METASTATIC RENAL CELL CARCINOMA, UNSPECIFIED LATERALITY (HCC): Primary | ICD-10-CM

## 2019-10-03 DIAGNOSIS — C78.00 MALIGNANT NEOPLASM METASTATIC TO LUNG, UNSPECIFIED LATERALITY (HCC): ICD-10-CM

## 2019-10-03 DIAGNOSIS — C64.9 METASTATIC RENAL CELL CARCINOMA, UNSPECIFIED LATERALITY (HCC): ICD-10-CM

## 2019-10-03 DIAGNOSIS — T40.2X5A THERAPEUTIC OPIOID INDUCED CONSTIPATION: ICD-10-CM

## 2019-10-03 DIAGNOSIS — L27.1 PALMAR PLANTAR ERYTHRODYSAESTHESIA: ICD-10-CM

## 2019-10-03 DIAGNOSIS — Z51.11 CHEMOTHERAPY MANAGEMENT, ENCOUNTER FOR: ICD-10-CM

## 2019-10-03 DIAGNOSIS — G89.3 CANCER RELATED PAIN: Primary | ICD-10-CM

## 2019-10-03 DIAGNOSIS — K59.03 THERAPEUTIC OPIOID INDUCED CONSTIPATION: ICD-10-CM

## 2019-10-03 PROCEDURE — 99215 OFFICE O/P EST HI 40 MIN: CPT | Performed by: INTERNAL MEDICINE

## 2019-10-03 PROCEDURE — 80053 COMPREHEN METABOLIC PANEL: CPT

## 2019-10-03 PROCEDURE — 85025 COMPLETE CBC W/AUTO DIFF WBC: CPT

## 2019-10-03 PROCEDURE — 36415 COLL VENOUS BLD VENIPUNCTURE: CPT

## 2019-10-03 PROCEDURE — 99214 OFFICE O/P EST MOD 30 MIN: CPT | Performed by: NURSE PRACTITIONER

## 2019-10-03 RX ORDER — HYDROCODONE BITARTRATE AND ACETAMINOPHEN 5; 325 MG/1; MG/1
1-2 TABLET ORAL
Qty: 180 TABLET | Refills: 0 | Status: SHIPPED | OUTPATIENT
Start: 2019-10-03 | End: 2019-11-01

## 2019-10-03 NOTE — PROGRESS NOTES
Palliative Care Follow Up Note     Patient Name: Samuel Venegas   YOB: 1950   Medical Record Number: [id number]   Date of visit: 10/3/19    Chief Complaint/Reason for Visit:  Patient presents with:  Palliative Care       History of Present SURGERY Left 2003       Allergies:  No Known Allergies    Family History:  Family History   Problem Relation Age of Onset   • Cancer Sister    • Cancer Brother        Social History:  Social History    Socioeconomic History      Marital status:  needed for Nausea. Disp: 60 tablet Rfl: 3       Review of Systems:  Review of Systems   Constitutional: Negative for chills, diaphoresis, fever and malaise/fatigue.         Patient reports decreased appetite, but still tried to eat at least 2 meals and 1-2 chronically-ill appearing   HENT:   Head: Normocephalic and atraumatic. Eyes: Pupils are equal, round, and reactive to light. EOM are normal.   Neck: Normal range of motion. Neck supple. Pulmonary/Chest: Effort normal. No respiratory distress.    Abdomi Norco 5/325 1-2 tabs Q4-6H/PRN for pain (prescription provided today for #180 pills)  -Pt did not want to start MS ER for pain relief  -Advised pt/family that pt is to NOT EXCEED 4,000mg Tylenol/Acetaminophen per day  -Continue Ibuprofen 400-600mg Q6-8H/AL

## 2019-10-03 NOTE — PROGRESS NOTES
Cancer Center Progress Note    Patient Name: Suzan Peacock   YOB: 1950   Medical Record Number: [id number]   Attending Physician: Juhi Jolley M.D.        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    H education: Not on file      Highest education level: Not on file    Occupational History      Occupation: [employer]        Comment: retired    Social Needs      Financial resource strain: Not on file      Food insecurity:        Worry: Not on file        Jeanne Not on file        Current Medications:    Current Outpatient Medications:   •  METFORMIN  MG Oral Tab, TAKE 1 TABLET (850 MG) BY MOUTH DAILY WITH BREAKFAST, Disp: 90 tablet, Rfl: 1  •  HYDROcodone-acetaminophen 5-325 MG Oral Tab, Take 1-2 tablets 04/22/19  0750   WBC 2.7*   HGB 10.0*   .0   NE 1.81           Lab Results   Component Value Date     (H) 10/03/2019    BUN 8 10/03/2019    BUNCREA 10.0 10/03/2019    CREATSERUM 0.80 10/03/2019    ANIONGAP 4 10/03/2019    GFRNAA 91 10/03/2019

## 2019-10-29 ENCOUNTER — TELEPHONE (OUTPATIENT)
Dept: INTERNAL MEDICINE CLINIC | Facility: CLINIC | Age: 69
End: 2019-10-29

## 2019-10-30 RX ORDER — LORAZEPAM 2 MG/1
TABLET ORAL
Qty: 45 TABLET | Refills: 0 | Status: SHIPPED
Start: 2019-10-30 | End: 2019-12-02

## 2019-10-30 NOTE — TELEPHONE ENCOUNTER
Dr Zuleima Ann for Dr Jesica Haley, please  Review pended refill request as it does not fall under a protocol.     Last Rx: 7/6/19 #45 x 1   Requested Prescriptions     Pending Prescriptions Disp Refills   • LORAZEPAM 2 MG Oral Tab [Pharmacy Med Name: LORAZEPAM 2 M

## 2019-10-31 ENCOUNTER — SNF/IP PROF CHARGE ONLY (OUTPATIENT)
Dept: HEMATOLOGY/ONCOLOGY | Facility: HOSPITAL | Age: 69
End: 2019-10-31

## 2019-10-31 DIAGNOSIS — C64.9 METASTATIC RENAL CELL CARCINOMA, UNSPECIFIED LATERALITY (HCC): ICD-10-CM

## 2019-10-31 PROCEDURE — G9678 ONCOLOGY CARE MODEL SERVICE: HCPCS | Performed by: INTERNAL MEDICINE

## 2019-10-31 RX ORDER — LORAZEPAM 2 MG/1
TABLET ORAL
Qty: 45 TABLET | OUTPATIENT
Start: 2019-10-31

## 2019-11-01 ENCOUNTER — OFFICE VISIT (OUTPATIENT)
Dept: HEMATOLOGY/ONCOLOGY | Facility: HOSPITAL | Age: 69
End: 2019-11-01
Attending: NURSE PRACTITIONER
Payer: MEDICARE

## 2019-11-01 ENCOUNTER — NURSE ONLY (OUTPATIENT)
Dept: HEMATOLOGY/ONCOLOGY | Facility: HOSPITAL | Age: 69
End: 2019-11-01
Attending: INTERNAL MEDICINE
Payer: MEDICARE

## 2019-11-01 VITALS
RESPIRATION RATE: 18 BRPM | DIASTOLIC BLOOD PRESSURE: 74 MMHG | TEMPERATURE: 98 F | WEIGHT: 148 LBS | SYSTOLIC BLOOD PRESSURE: 118 MMHG | OXYGEN SATURATION: 98 % | HEIGHT: 66 IN | HEART RATE: 112 BPM | BODY MASS INDEX: 23.78 KG/M2

## 2019-11-01 DIAGNOSIS — L27.1 PALMAR PLANTAR ERYTHRODYSAESTHESIA: ICD-10-CM

## 2019-11-01 DIAGNOSIS — C64.9 METASTATIC RENAL CELL CARCINOMA, UNSPECIFIED LATERALITY (HCC): Primary | ICD-10-CM

## 2019-11-01 DIAGNOSIS — T40.2X5A THERAPEUTIC OPIOID INDUCED CONSTIPATION: ICD-10-CM

## 2019-11-01 DIAGNOSIS — C64.9 METASTATIC RENAL CELL CARCINOMA, UNSPECIFIED LATERALITY (HCC): ICD-10-CM

## 2019-11-01 DIAGNOSIS — C78.00 MALIGNANT NEOPLASM METASTATIC TO LUNG, UNSPECIFIED LATERALITY (HCC): ICD-10-CM

## 2019-11-01 DIAGNOSIS — F41.9 ANXIETY: ICD-10-CM

## 2019-11-01 DIAGNOSIS — K59.03 THERAPEUTIC OPIOID INDUCED CONSTIPATION: ICD-10-CM

## 2019-11-01 DIAGNOSIS — Z51.11 CHEMOTHERAPY MANAGEMENT, ENCOUNTER FOR: ICD-10-CM

## 2019-11-01 DIAGNOSIS — G89.3 CANCER RELATED PAIN: Primary | ICD-10-CM

## 2019-11-01 PROCEDURE — 99215 OFFICE O/P EST HI 40 MIN: CPT | Performed by: INTERNAL MEDICINE

## 2019-11-01 PROCEDURE — 36415 COLL VENOUS BLD VENIPUNCTURE: CPT

## 2019-11-01 PROCEDURE — 99214 OFFICE O/P EST MOD 30 MIN: CPT | Performed by: NURSE PRACTITIONER

## 2019-11-01 PROCEDURE — 80053 COMPREHEN METABOLIC PANEL: CPT

## 2019-11-01 PROCEDURE — 85025 COMPLETE CBC W/AUTO DIFF WBC: CPT

## 2019-11-01 RX ORDER — LORAZEPAM 2 MG/1
TABLET ORAL
Qty: 45 TABLET | OUTPATIENT
Start: 2019-11-01

## 2019-11-01 RX ORDER — HYDROCODONE BITARTRATE AND ACETAMINOPHEN 5; 325 MG/1; MG/1
1-2 TABLET ORAL
Qty: 180 TABLET | Refills: 0 | Status: SHIPPED | OUTPATIENT
Start: 2019-11-08 | End: 2019-12-10

## 2019-11-01 NOTE — TELEPHONE ENCOUNTER
Daughter/Mary Adria Fabry [phone] is calling for the status of the medication refill request. Per the daughter the pharmacy has not received the request yet and the patient is completely out of the medication. Can this be sent to the pharmacy today?

## 2019-11-01 NOTE — TELEPHONE ENCOUNTER
Contacted [facility] pharmacy, spoke with Ang Ramirez dispensing pharmacist to give a verbal on prescription    LORazepam 2 MG Oral Tab 45 tablet 0 10/30/2019     Sig: TAKE 1/2 TABLET BY MOUTH IN THE MORNING, AND 1 TABLET AT BEDTIME    Sent to pharmacy as: LORazepam 2

## 2019-11-01 NOTE — PROGRESS NOTES
Cancer Center Progress Note    Patient Name: Yogi Beyer   YOB: 1950   Medical Record Number: [id number]   Attending Physician: Silverio Hall M.D.        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 No hepatosplenomegaly. No palpable mass. Extremities: No edema. Neurological: 5/5 motor x4.       Laboratory:  Recent Labs   Lab 04/22/19  0750   WBC 2.7*   HGB 10.0*   .0   NE 1.81           Lab Results

## 2019-11-01 NOTE — PROGRESS NOTES
Palliative Care Follow Up Note     Patient Name: Melani Cisse   YOB: 1950   Medical Record Number: [id number]   Date of visit: 11/1/19    Chief Complaint/Reason for Visit:  Patient presents with:  Palliative Care     History of Present [place] Left 2003       Allergies:  No Known Allergies    Family History:  Family History   Problem Relation Age of Onset   • Cancer Sister    • Cancer Brother        Social History:  Social History    Socioeconomic History      Marital status:       Spouse daily.  , Disp: , Rfl:         Review of Systems:  Review of Systems   Constitutional: Negative for chills, diaphoresis, fever and malaise/fatigue.         Appetite improving; 2 pound weight gain since last visit; drinks 1-2 supplemental protein shakes/day Pupils are equal, round, and reactive to light. Neck:      Musculoskeletal: Normal range of motion and neck supple. Cardiovascular:      Rate and Rhythm: Normal rate and regular rhythm. Pulses: Normal pulses.       Heart sounds: Normal heart sounds tolerance  -Controlled Substance Agreement reviewed with pt/family today and signed by pt during initial visit on 2/26/19  -Reviewed [place]   -Discussed MOA and explained side effects of pain medications  -Rx provided for Norco 5/325 1-2 tabs Q4-6H/PRN for

## 2019-11-15 DIAGNOSIS — C64.9 METASTATIC RENAL CELL CARCINOMA, UNSPECIFIED LATERALITY (HCC): ICD-10-CM

## 2019-11-30 ENCOUNTER — SNF/IP PROF CHARGE ONLY (OUTPATIENT)
Dept: HEMATOLOGY/ONCOLOGY | Facility: HOSPITAL | Age: 69
End: 2019-11-30

## 2019-11-30 DIAGNOSIS — C64.9 METASTATIC RENAL CELL CARCINOMA, UNSPECIFIED LATERALITY (HCC): ICD-10-CM

## 2019-11-30 PROCEDURE — G9678 ONCOLOGY CARE MODEL SERVICE: HCPCS | Performed by: INTERNAL MEDICINE

## 2019-12-01 RX ORDER — LORAZEPAM 2 MG/1
TABLET ORAL
Qty: 45 TABLET | Refills: 1 | OUTPATIENT
Start: 2019-12-01

## 2019-12-01 NOTE — TELEPHONE ENCOUNTER
Controlled medication pending for review. Please change to phone in, fax, or print script if not being sent electronically.     Last Rx: 10-30-19 # 45  LOV: 6-20-19    Requested Prescriptions     Pending Prescriptions Disp Refills   • LORazepam 2 MG Oral T

## 2019-12-02 RX ORDER — LORAZEPAM 2 MG/1
TABLET ORAL
Qty: 45 TABLET | Refills: 0 | Status: SHIPPED | OUTPATIENT
Start: 2019-12-02 | End: 2019-12-30

## 2019-12-03 RX ORDER — LORAZEPAM 2 MG/1
TABLET ORAL
Qty: 45 TABLET | Refills: 0 | OUTPATIENT
Start: 2019-12-03

## 2019-12-07 ENCOUNTER — HOSPITAL ENCOUNTER (OUTPATIENT)
Dept: CT IMAGING | Age: 69
Discharge: HOME | End: 2019-12-07
Attending: INTERNAL MEDICINE
Payer: MEDICARE

## 2019-12-07 DIAGNOSIS — C64.9 METASTATIC RENAL CELL CARCINOMA, UNSPECIFIED LATERALITY (HCC): ICD-10-CM

## 2019-12-07 DIAGNOSIS — C78.00 MALIGNANT NEOPLASM METASTATIC TO LUNG, UNSPECIFIED LATERALITY (HCC): ICD-10-CM

## 2019-12-07 PROCEDURE — 74177 CT ABD & PELVIS W/CONTRAST: CPT | Performed by: INTERNAL MEDICINE

## 2019-12-07 PROCEDURE — 71260 CT THORAX DX C+: CPT | Performed by: INTERNAL MEDICINE

## 2019-12-07 PROCEDURE — 82565 ASSAY OF CREATININE: CPT

## 2019-12-10 ENCOUNTER — OFFICE VISIT (OUTPATIENT)
Dept: HEMATOLOGY/ONCOLOGY | Facility: HOSPITAL | Age: 69
End: 2019-12-10
Attending: NURSE PRACTITIONER
Payer: MEDICARE

## 2019-12-10 ENCOUNTER — OFFICE VISIT (OUTPATIENT)
Dept: HEMATOLOGY/ONCOLOGY | Facility: HOSPITAL | Age: 69
End: 2019-12-10
Attending: INTERNAL MEDICINE
Payer: MEDICARE

## 2019-12-10 VITALS
TEMPERATURE: 98 F | HEIGHT: 66 IN | WEIGHT: 145 LBS | RESPIRATION RATE: 18 BRPM | DIASTOLIC BLOOD PRESSURE: 88 MMHG | SYSTOLIC BLOOD PRESSURE: 146 MMHG | OXYGEN SATURATION: 98 % | BODY MASS INDEX: 23.3 KG/M2

## 2019-12-10 DIAGNOSIS — G89.3 CANCER RELATED PAIN: ICD-10-CM

## 2019-12-10 DIAGNOSIS — C64.9 METASTATIC RENAL CELL CARCINOMA, UNSPECIFIED LATERALITY (HCC): Primary | ICD-10-CM

## 2019-12-10 DIAGNOSIS — F41.9 ANXIETY: ICD-10-CM

## 2019-12-10 DIAGNOSIS — R63.0 DECREASED APPETITE: ICD-10-CM

## 2019-12-10 DIAGNOSIS — L27.1 PALMAR PLANTAR ERYTHRODYSAESTHESIA: ICD-10-CM

## 2019-12-10 DIAGNOSIS — T40.2X5A THERAPEUTIC OPIOID INDUCED CONSTIPATION: ICD-10-CM

## 2019-12-10 DIAGNOSIS — R63.4 WEIGHT LOSS: ICD-10-CM

## 2019-12-10 DIAGNOSIS — K59.03 THERAPEUTIC OPIOID INDUCED CONSTIPATION: ICD-10-CM

## 2019-12-10 DIAGNOSIS — G89.3 CANCER RELATED PAIN: Primary | ICD-10-CM

## 2019-12-10 DIAGNOSIS — C78.00 MALIGNANT NEOPLASM METASTATIC TO LUNG, UNSPECIFIED LATERALITY (HCC): ICD-10-CM

## 2019-12-10 DIAGNOSIS — C64.9 METASTATIC RENAL CELL CARCINOMA, UNSPECIFIED LATERALITY (HCC): ICD-10-CM

## 2019-12-10 DIAGNOSIS — Z51.11 CHEMOTHERAPY MANAGEMENT, ENCOUNTER FOR: ICD-10-CM

## 2019-12-10 PROCEDURE — 36415 COLL VENOUS BLD VENIPUNCTURE: CPT

## 2019-12-10 PROCEDURE — 85025 COMPLETE CBC W/AUTO DIFF WBC: CPT

## 2019-12-10 PROCEDURE — 80053 COMPREHEN METABOLIC PANEL: CPT

## 2019-12-10 PROCEDURE — 99215 OFFICE O/P EST HI 40 MIN: CPT | Performed by: INTERNAL MEDICINE

## 2019-12-10 PROCEDURE — 99214 OFFICE O/P EST MOD 30 MIN: CPT | Performed by: NURSE PRACTITIONER

## 2019-12-10 RX ORDER — HYDROCODONE BITARTRATE AND ACETAMINOPHEN 5; 325 MG/1; MG/1
1-2 TABLET ORAL
Qty: 180 TABLET | Refills: 0 | Status: SHIPPED | OUTPATIENT
Start: 2019-12-23 | End: 2020-01-08

## 2019-12-10 NOTE — PROGRESS NOTES
Cancer Center Progress Note    Patient Name: Eduar Arreguin   YOB: 1950   Medical Record Number: [id number]   Attending Physician: Juhi Jolley M.D.        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 of education: Not on file      Highest education level: Not on file    Occupational History      Occupation: Ar[employer]        Comment: retired    Social Needs      Financial resource strain: Not on file      Food insecurity:        Worry: Not on file Narrative      Not on file        Current Medications:    Current Outpatient Medications:   •  [START ON 12/23/2019] HYDROcodone-acetaminophen 5-325 MG Oral Tab, Take 1-2 tablets by mouth every 4 to 6 hours as needed for Pain., Disp: 180 tablet, Rfl: 0  • S1S2  Abdomen: Soft, non tender. No hepatosplenomegaly. No palpable mass. Extremities: No edema. Neurological: 5/5 motor x4.       Laboratory:  Recent Labs   Lab 04/22/19  0750   WBC 2.7*   HGB 10.0*   .0   NE 1.81           Lab Results   Compon

## 2019-12-10 NOTE — PROGRESS NOTES
Palliative Care Follow Up Note     Patient Name: Elizabeth Guardado   YOB: 1950   Medical Record Number: [id number]   Date of visit: 12/10/2019    Chief Complaint/Reason for Visit:  Patient presents with:  Palliative Care     History of History:  Past Surgical History:   Procedure Laterality Date   • AMPUTATION FINGER/THUMB Right 1965    traumatic, x 3   • WRIST FRACTURE SURGERY Left 2003       Allergies:  No Known Allergies    Family History:  Family History   Problem Relation Age of Ons 1 tablet (10 mg total) by mouth every 6 (six) hours as needed for Nausea. 60 tablet 3   • ASPIRIN OR Take 81 mg by mouth daily.            Review of Systems:  Review of Systems   Constitutional: Positive for weight loss (3 pound weight loss since last PC vi is not diaphoretic. Comments: Yakut-speaking male,thin, chronically-ill appearing   HENT:      Head: Normocephalic and atraumatic. Eyes:      Pupils: Pupils are equal, round, and reactive to light.    Neck:      Musculoskeletal: Normal range of mot Metastatic renal cell carcinoma, unspecified laterality ([facility] [address]    7.  Malignant neoplasm metastatic to lung, unspecified laterality (HCC)    Cancer Related Pain  -Discussed addiction vs tolerance  -Controlled Substance Agreement reviewed with pt/family today

## 2019-12-30 NOTE — TELEPHONE ENCOUNTER
Patient needs a refill on   LORazepam 2 MG Oral Tab 45 tablet 0 12/2/2019    Sig: [employer] 1/2 TABLET BY MOUTH IN THE MORNING, AND 1 TABLET AT BEDTIME       Before he goes on vacation (01/27/2020-02/06/2020). Please fill before he goes on a trip. Thank you.

## 2019-12-31 ENCOUNTER — SNF/IP PROF CHARGE ONLY (OUTPATIENT)
Dept: HEMATOLOGY/ONCOLOGY | Facility: HOSPITAL | Age: 69
End: 2019-12-31

## 2019-12-31 DIAGNOSIS — C64.9 METASTATIC RENAL CELL CARCINOMA, UNSPECIFIED LATERALITY (HCC): ICD-10-CM

## 2019-12-31 PROCEDURE — G9678 ONCOLOGY CARE MODEL SERVICE: HCPCS | Performed by: INTERNAL MEDICINE

## 2019-12-31 RX ORDER — LORAZEPAM 2 MG/1
TABLET ORAL
Qty: 45 TABLET | Refills: 0 | Status: SHIPPED | OUTPATIENT
Start: 2019-12-31 | End: 2020-01-12

## 2020-01-01 ENCOUNTER — APPOINTMENT (OUTPATIENT)
Dept: HEMATOLOGY/ONCOLOGY | Facility: HOSPITAL | Age: 70
End: 2020-01-01
Attending: INTERNAL MEDICINE
Payer: MEDICARE

## 2020-01-01 ENCOUNTER — APPOINTMENT (OUTPATIENT)
Dept: HEMATOLOGY/ONCOLOGY | Facility: HOSPITAL | Age: 70
End: 2020-01-01
Attending: NURSE PRACTITIONER
Payer: MEDICARE

## 2020-01-01 ENCOUNTER — LAB ENCOUNTER (OUTPATIENT)
Dept: LAB | Age: 70
End: 2020-01-01
Attending: INTERNAL MEDICINE
Payer: MEDICARE

## 2020-01-01 ENCOUNTER — TELEPHONE (OUTPATIENT)
Dept: HEMATOLOGY/ONCOLOGY | Facility: HOSPITAL | Age: 70
End: 2020-01-01

## 2020-01-01 ENCOUNTER — SNF/IP PROF CHARGE ONLY (OUTPATIENT)
Dept: HEMATOLOGY/ONCOLOGY | Facility: HOSPITAL | Age: 70
End: 2020-01-01

## 2020-01-01 ENCOUNTER — HOSPITAL ENCOUNTER (OUTPATIENT)
Dept: CT IMAGING | Age: 70
Discharge: HOME | End: 2020-01-01
Attending: INTERNAL MEDICINE
Payer: MEDICARE

## 2020-01-01 ENCOUNTER — OFFICE VISIT (OUTPATIENT)
Dept: INTERNAL MEDICINE CLINIC | Facility: CLINIC | Age: 70
End: 2020-01-01
Payer: MEDICARE

## 2020-01-01 ENCOUNTER — HOSPITAL ENCOUNTER (EMERGENCY)
Facility: HOSPITAL | Age: 70
Discharge: HOME | End: 2020-01-01
Attending: EMERGENCY MEDICINE
Payer: MEDICARE

## 2020-01-01 ENCOUNTER — TELEPHONE (OUTPATIENT)
Dept: INTERNAL MEDICINE CLINIC | Facility: CLINIC | Age: 70
End: 2020-01-01

## 2020-01-01 ENCOUNTER — HOSPITAL ENCOUNTER (OUTPATIENT)
Dept: CT IMAGING | Facility: HOSPITAL | Age: 70
Discharge: HOME | End: 2020-01-01
Attending: NURSE PRACTITIONER
Payer: MEDICARE

## 2020-01-01 VITALS
HEIGHT: 66 IN | OXYGEN SATURATION: 98 % | HEART RATE: 107 BPM | WEIGHT: 140.81 LBS | TEMPERATURE: 100 F | RESPIRATION RATE: 18 BRPM | BODY MASS INDEX: 22.63 KG/M2 | DIASTOLIC BLOOD PRESSURE: 86 MMHG | SYSTOLIC BLOOD PRESSURE: 124 MMHG

## 2020-01-01 VITALS
HEART RATE: 93 BPM | BODY MASS INDEX: 23.3 KG/M2 | HEIGHT: 66 IN | WEIGHT: 145 LBS | RESPIRATION RATE: 18 BRPM | OXYGEN SATURATION: 98 % | TEMPERATURE: 98 F | SYSTOLIC BLOOD PRESSURE: 114 MMHG | DIASTOLIC BLOOD PRESSURE: 69 MMHG

## 2020-01-01 VITALS
BODY MASS INDEX: 23.46 KG/M2 | WEIGHT: 146 LBS | HEIGHT: 66 IN | RESPIRATION RATE: 18 BRPM | DIASTOLIC BLOOD PRESSURE: 95 MMHG | OXYGEN SATURATION: 100 % | TEMPERATURE: 98 F | HEART RATE: 99 BPM | SYSTOLIC BLOOD PRESSURE: 116 MMHG

## 2020-01-01 VITALS
SYSTOLIC BLOOD PRESSURE: 137 MMHG | HEART RATE: 89 BPM | OXYGEN SATURATION: 99 % | BODY MASS INDEX: 23.95 KG/M2 | WEIGHT: 149 LBS | HEIGHT: 66 IN | RESPIRATION RATE: 16 BRPM | DIASTOLIC BLOOD PRESSURE: 70 MMHG

## 2020-01-01 VITALS
BODY MASS INDEX: 23.63 KG/M2 | RESPIRATION RATE: 16 BRPM | HEIGHT: 66 IN | TEMPERATURE: 99 F | SYSTOLIC BLOOD PRESSURE: 119 MMHG | HEART RATE: 102 BPM | DIASTOLIC BLOOD PRESSURE: 81 MMHG | OXYGEN SATURATION: 99 % | WEIGHT: 147 LBS

## 2020-01-01 VITALS
DIASTOLIC BLOOD PRESSURE: 67 MMHG | BODY MASS INDEX: 20.25 KG/M2 | SYSTOLIC BLOOD PRESSURE: 117 MMHG | HEIGHT: 65.98 IN | WEIGHT: 126 LBS | TEMPERATURE: 98 F | HEART RATE: 100 BPM | OXYGEN SATURATION: 98 % | RESPIRATION RATE: 18 BRPM

## 2020-01-01 VITALS
WEIGHT: 126 LBS | HEIGHT: 66 IN | DIASTOLIC BLOOD PRESSURE: 67 MMHG | SYSTOLIC BLOOD PRESSURE: 117 MMHG | HEART RATE: 100 BPM | BODY MASS INDEX: 20.25 KG/M2 | OXYGEN SATURATION: 98 % | RESPIRATION RATE: 18 BRPM | TEMPERATURE: 98 F

## 2020-01-01 VITALS
RESPIRATION RATE: 16 BRPM | HEART RATE: 111 BPM | WEIGHT: 146 LBS | SYSTOLIC BLOOD PRESSURE: 131 MMHG | DIASTOLIC BLOOD PRESSURE: 79 MMHG | HEIGHT: 66 IN | BODY MASS INDEX: 23.46 KG/M2 | OXYGEN SATURATION: 99 %

## 2020-01-01 VITALS
BODY MASS INDEX: 23.78 KG/M2 | OXYGEN SATURATION: 97 % | HEIGHT: 66 IN | SYSTOLIC BLOOD PRESSURE: 124 MMHG | WEIGHT: 148 LBS | TEMPERATURE: 98 F | HEART RATE: 92 BPM | RESPIRATION RATE: 16 BRPM | DIASTOLIC BLOOD PRESSURE: 82 MMHG

## 2020-01-01 VITALS
RESPIRATION RATE: 18 BRPM | SYSTOLIC BLOOD PRESSURE: 116 MMHG | BODY MASS INDEX: 23.46 KG/M2 | HEIGHT: 66 IN | TEMPERATURE: 98 F | WEIGHT: 146 LBS | HEART RATE: 99 BPM | DIASTOLIC BLOOD PRESSURE: 95 MMHG | OXYGEN SATURATION: 100 %

## 2020-01-01 VITALS
BODY MASS INDEX: 23.14 KG/M2 | DIASTOLIC BLOOD PRESSURE: 70 MMHG | HEIGHT: 66 IN | RESPIRATION RATE: 12 BRPM | WEIGHT: 144 LBS | HEART RATE: 111 BPM | SYSTOLIC BLOOD PRESSURE: 104 MMHG | TEMPERATURE: 101 F

## 2020-01-01 VITALS
TEMPERATURE: 100 F | BODY MASS INDEX: 22.63 KG/M2 | HEIGHT: 66 IN | RESPIRATION RATE: 18 BRPM | SYSTOLIC BLOOD PRESSURE: 124 MMHG | WEIGHT: 140.81 LBS | HEART RATE: 107 BPM | OXYGEN SATURATION: 98 % | DIASTOLIC BLOOD PRESSURE: 86 MMHG

## 2020-01-01 VITALS
TEMPERATURE: 99 F | WEIGHT: 147 LBS | SYSTOLIC BLOOD PRESSURE: 128 MMHG | HEART RATE: 97 BPM | OXYGEN SATURATION: 98 % | HEIGHT: 66 IN | RESPIRATION RATE: 18 BRPM | BODY MASS INDEX: 23.63 KG/M2 | DIASTOLIC BLOOD PRESSURE: 69 MMHG

## 2020-01-01 VITALS
RESPIRATION RATE: 18 BRPM | BODY MASS INDEX: 23.46 KG/M2 | OXYGEN SATURATION: 99 % | WEIGHT: 146 LBS | TEMPERATURE: 100 F | DIASTOLIC BLOOD PRESSURE: 78 MMHG | SYSTOLIC BLOOD PRESSURE: 142 MMHG | HEART RATE: 90 BPM | HEIGHT: 66 IN

## 2020-01-01 DIAGNOSIS — C78.00 MALIGNANT NEOPLASM METASTATIC TO LUNG, UNSPECIFIED LATERALITY (HCC): ICD-10-CM

## 2020-01-01 DIAGNOSIS — C64.9 METASTATIC RENAL CELL CARCINOMA, UNSPECIFIED LATERALITY (HCC): Primary | ICD-10-CM

## 2020-01-01 DIAGNOSIS — L27.1 PALMAR PLANTAR ERYTHRODYSAESTHESIA: ICD-10-CM

## 2020-01-01 DIAGNOSIS — G89.3 CANCER RELATED PAIN: ICD-10-CM

## 2020-01-01 DIAGNOSIS — T40.2X5A THERAPEUTIC OPIOID-INDUCED CONSTIPATION (OIC): ICD-10-CM

## 2020-01-01 DIAGNOSIS — T40.2X5A THERAPEUTIC OPIOID INDUCED CONSTIPATION: ICD-10-CM

## 2020-01-01 DIAGNOSIS — C64.9 METASTATIC RENAL CELL CARCINOMA, UNSPECIFIED LATERALITY (HCC): ICD-10-CM

## 2020-01-01 DIAGNOSIS — G89.3 CANCER RELATED PAIN: Primary | ICD-10-CM

## 2020-01-01 DIAGNOSIS — C78.00 MALIGNANT NEOPLASM METASTATIC TO LUNG, UNSPECIFIED LATERALITY (HCC): Primary | ICD-10-CM

## 2020-01-01 DIAGNOSIS — K59.03 THERAPEUTIC OPIOID-INDUCED CONSTIPATION (OIC): ICD-10-CM

## 2020-01-01 DIAGNOSIS — Z51.11 CHEMOTHERAPY MANAGEMENT, ENCOUNTER FOR: ICD-10-CM

## 2020-01-01 DIAGNOSIS — Z71.89 ADVANCE CARE PLANNING: ICD-10-CM

## 2020-01-01 DIAGNOSIS — D64.9 ANEMIA, UNSPECIFIED TYPE: ICD-10-CM

## 2020-01-01 DIAGNOSIS — F41.1 GAD (GENERALIZED ANXIETY DISORDER): ICD-10-CM

## 2020-01-01 DIAGNOSIS — Z87.898 HISTORY OF FEVER: Primary | ICD-10-CM

## 2020-01-01 DIAGNOSIS — K59.03 THERAPEUTIC OPIOID INDUCED CONSTIPATION: ICD-10-CM

## 2020-01-01 DIAGNOSIS — R53.83 OTHER FATIGUE: ICD-10-CM

## 2020-01-01 DIAGNOSIS — E11.9 CONTROLLED TYPE 2 DIABETES MELLITUS WITHOUT COMPLICATION, WITHOUT LONG-TERM CURRENT USE OF INSULIN (HCC): ICD-10-CM

## 2020-01-01 DIAGNOSIS — Z71.89 GOALS OF CARE, COUNSELING/DISCUSSION: ICD-10-CM

## 2020-01-01 DIAGNOSIS — R63.0 DECREASED APPETITE: ICD-10-CM

## 2020-01-01 DIAGNOSIS — R50.9 FEVER, UNSPECIFIED FEVER CAUSE: Primary | ICD-10-CM

## 2020-01-01 DIAGNOSIS — R63.4 WEIGHT LOSS: ICD-10-CM

## 2020-01-01 DIAGNOSIS — R53.83 FATIGUE, UNSPECIFIED TYPE: ICD-10-CM

## 2020-01-01 LAB
ALBUMIN SERPL-MCNC: 2 G/DL (ref 3.4–5)
ALBUMIN SERPL-MCNC: 2.3 G/DL (ref 3.4–5)
ALBUMIN SERPL-MCNC: 2.5 G/DL (ref 3.4–5)
ALBUMIN SERPL-MCNC: 2.6 G/DL (ref 3.4–5)
ALBUMIN SERPL-MCNC: 2.8 G/DL (ref 3.4–5)
ALBUMIN/GLOB SERPL: 0.4 {RATIO} (ref 1–2)
ALBUMIN/GLOB SERPL: 0.5 {RATIO} (ref 1–2)
ALBUMIN/GLOB SERPL: 0.5 {RATIO} (ref 1–2)
ALBUMIN/GLOB SERPL: 0.6 {RATIO} (ref 1–2)
ALBUMIN/GLOB SERPL: 0.6 {RATIO} (ref 1–2)
ALP LIVER SERPL-CCNC: 77 U/L (ref 45–117)
ALP LIVER SERPL-CCNC: 83 U/L (ref 45–117)
ALP LIVER SERPL-CCNC: 88 U/L
ALP LIVER SERPL-CCNC: 89 U/L (ref 45–117)
ALP LIVER SERPL-CCNC: 90 U/L (ref 45–117)
ALT SERPL-CCNC: 19 U/L
ALT SERPL-CCNC: 21 U/L (ref 16–61)
ALT SERPL-CCNC: 21 U/L (ref 16–61)
ALT SERPL-CCNC: 26 U/L (ref 16–61)
ALT SERPL-CCNC: 26 U/L (ref 16–61)
ANION GAP SERPL CALC-SCNC: 3 MMOL/L (ref 0–18)
ANION GAP SERPL CALC-SCNC: 5 MMOL/L (ref 0–18)
ANION GAP SERPL CALC-SCNC: 6 MMOL/L (ref 0–18)
ANION GAP SERPL CALC-SCNC: 7 MMOL/L (ref 0–18)
ANION GAP SERPL CALC-SCNC: 7 MMOL/L (ref 0–18)
AST SERPL-CCNC: 23 U/L (ref 15–37)
AST SERPL-CCNC: 25 U/L (ref 15–37)
AST SERPL-CCNC: 25 U/L (ref 15–37)
AST SERPL-CCNC: 27 U/L (ref 15–37)
AST SERPL-CCNC: 31 U/L (ref 15–37)
BASOPHILS # BLD AUTO: 0.01 X10(3) UL (ref 0–0.2)
BASOPHILS # BLD AUTO: 0.02 X10(3) UL (ref 0–0.2)
BASOPHILS # BLD AUTO: 0.03 X10(3) UL (ref 0–0.2)
BASOPHILS NFR BLD AUTO: 0.2 %
BASOPHILS NFR BLD AUTO: 0.5 %
BASOPHILS NFR BLD AUTO: 0.6 %
BILIRUB SERPL-MCNC: 0.2 MG/DL (ref 0.1–2)
BILIRUB SERPL-MCNC: 0.3 MG/DL (ref 0.1–2)
BILIRUB SERPL-MCNC: 0.3 MG/DL (ref 0.1–2)
BILIRUB SERPL-MCNC: 0.4 MG/DL (ref 0.1–2)
BILIRUB SERPL-MCNC: 0.4 MG/DL (ref 0.1–2)
BUN BLD-MCNC: 10 MG/DL (ref 7–18)
BUN BLD-MCNC: 12 MG/DL (ref 7–18)
BUN BLD-MCNC: 15 MG/DL (ref 7–18)
BUN BLD-MCNC: 7 MG/DL (ref 7–18)
BUN BLD-MCNC: 7 MG/DL (ref 7–18)
BUN/CREAT SERPL: 10.6 (ref 10–20)
BUN/CREAT SERPL: 11.8 (ref 10–20)
BUN/CREAT SERPL: 16.7 (ref 10–20)
BUN/CREAT SERPL: 20.3 (ref 10–20)
BUN/CREAT SERPL: 9.3 (ref 10–20)
CALCIUM BLD-MCNC: 7.3 MG/DL (ref 8.5–10.1)
CALCIUM BLD-MCNC: 7.7 MG/DL (ref 8.5–10.1)
CALCIUM BLD-MCNC: 7.8 MG/DL (ref 8.5–10.1)
CALCIUM BLD-MCNC: 8.1 MG/DL (ref 8.5–10.1)
CALCIUM BLD-MCNC: 8.2 MG/DL (ref 8.5–10.1)
CHLORIDE SERPL-SCNC: 100 MMOL/L (ref 98–112)
CHLORIDE SERPL-SCNC: 101 MMOL/L (ref 98–112)
CHLORIDE SERPL-SCNC: 102 MMOL/L (ref 98–112)
CHLORIDE SERPL-SCNC: 102 MMOL/L (ref 98–112)
CHLORIDE SERPL-SCNC: 104 MMOL/L (ref 98–112)
CO2 SERPL-SCNC: 27 MMOL/L (ref 21–32)
CO2 SERPL-SCNC: 29 MMOL/L (ref 21–32)
CO2 SERPL-SCNC: 30 MMOL/L (ref 21–32)
CREAT BLD-MCNC: 0.66 MG/DL
CREAT BLD-MCNC: 0.7 MG/DL (ref 0.7–1.3)
CREAT BLD-MCNC: 0.72 MG/DL (ref 0.7–1.3)
CREAT BLD-MCNC: 0.74 MG/DL (ref 0.7–1.3)
CREAT BLD-MCNC: 0.75 MG/DL (ref 0.7–1.3)
CREAT BLD-MCNC: 0.85 MG/DL (ref 0.7–1.3)
DEPRECATED RDW RBC AUTO: 58.6 FL (ref 35.1–46.3)
DEPRECATED RDW RBC AUTO: 59.8 FL (ref 35.1–46.3)
DEPRECATED RDW RBC AUTO: 62.8 FL (ref 35.1–46.3)
DEPRECATED RDW RBC AUTO: 63.4 FL (ref 35.1–46.3)
DEPRECATED RDW RBC AUTO: 64.2 FL (ref 35.1–46.3)
EOSINOPHIL # BLD AUTO: 0.01 X10(3) UL (ref 0–0.7)
EOSINOPHIL # BLD AUTO: 0.02 X10(3) UL (ref 0–0.7)
EOSINOPHIL # BLD AUTO: 0.03 X10(3) UL (ref 0–0.7)
EOSINOPHIL NFR BLD AUTO: 0.2 %
EOSINOPHIL NFR BLD AUTO: 0.5 %
EOSINOPHIL NFR BLD AUTO: 0.7 %
ERYTHROCYTE [DISTWIDTH] IN BLOOD BY AUTOMATED COUNT: 16.5 % (ref 11–15)
ERYTHROCYTE [DISTWIDTH] IN BLOOD BY AUTOMATED COUNT: 17 % (ref 11–15)
ERYTHROCYTE [DISTWIDTH] IN BLOOD BY AUTOMATED COUNT: 17.3 % (ref 11–15)
ERYTHROCYTE [DISTWIDTH] IN BLOOD BY AUTOMATED COUNT: 17.4 % (ref 11–15)
ERYTHROCYTE [DISTWIDTH] IN BLOOD BY AUTOMATED COUNT: 17.9 % (ref 11–15)
GLOBULIN PLAS-MCNC: 4.5 G/DL (ref 2.8–4.4)
GLOBULIN PLAS-MCNC: 4.5 G/DL (ref 2.8–4.4)
GLOBULIN PLAS-MCNC: 5 G/DL (ref 2.8–4.4)
GLOBULIN PLAS-MCNC: 5.1 G/DL (ref 2.8–4.4)
GLOBULIN PLAS-MCNC: 5.3 G/DL (ref 2.8–4.4)
GLUCOSE BLD-MCNC: 104 MG/DL (ref 70–99)
GLUCOSE BLD-MCNC: 110 MG/DL (ref 70–99)
GLUCOSE BLD-MCNC: 120 MG/DL (ref 70–99)
GLUCOSE BLD-MCNC: 129 MG/DL (ref 70–99)
GLUCOSE BLD-MCNC: 91 MG/DL (ref 70–99)
HCT VFR BLD AUTO: 35.4 %
HCT VFR BLD AUTO: 36.6 % (ref 39–53)
HCT VFR BLD AUTO: 36.9 % (ref 39–53)
HCT VFR BLD AUTO: 37.2 % (ref 39–53)
HCT VFR BLD AUTO: 37.9 % (ref 39–53)
HGB BLD-MCNC: 11.6 G/DL
HGB BLD-MCNC: 11.7 G/DL (ref 13–17.5)
HGB BLD-MCNC: 11.9 G/DL (ref 13–17.5)
HGB BLD-MCNC: 12.2 G/DL (ref 13–17.5)
HGB BLD-MCNC: 12.3 G/DL (ref 13–17.5)
IMM GRANULOCYTES # BLD AUTO: 0.01 X10(3) UL (ref 0–1)
IMM GRANULOCYTES # BLD AUTO: 0.01 X10(3) UL (ref 0–1)
IMM GRANULOCYTES # BLD AUTO: 0.02 X10(3) UL (ref 0–1)
IMM GRANULOCYTES NFR BLD: 0.2 %
IMM GRANULOCYTES NFR BLD: 0.2 %
IMM GRANULOCYTES NFR BLD: 0.4 %
IMM GRANULOCYTES NFR BLD: 0.5 %
IMM GRANULOCYTES NFR BLD: 0.5 %
LYMPHOCYTES # BLD AUTO: 0.86 X10(3) UL (ref 1–4)
LYMPHOCYTES # BLD AUTO: 0.86 X10(3) UL (ref 1–4)
LYMPHOCYTES # BLD AUTO: 1.07 X10(3) UL (ref 1–4)
LYMPHOCYTES # BLD AUTO: 1.08 X10(3) UL (ref 1–4)
LYMPHOCYTES # BLD AUTO: 1.3 X10(3) UL (ref 1–4)
LYMPHOCYTES NFR BLD AUTO: 21 %
LYMPHOCYTES NFR BLD AUTO: 21.1 %
LYMPHOCYTES NFR BLD AUTO: 23.3 %
LYMPHOCYTES NFR BLD AUTO: 26.6 %
LYMPHOCYTES NFR BLD AUTO: 30.6 %
M PROTEIN MFR SERPL ELPH: 7 G/DL (ref 6.4–8.2)
M PROTEIN MFR SERPL ELPH: 7.3 G/DL (ref 6.4–8.2)
M PROTEIN MFR SERPL ELPH: 7.3 G/DL (ref 6.4–8.2)
M PROTEIN MFR SERPL ELPH: 7.4 G/DL (ref 6.4–8.2)
M PROTEIN MFR SERPL ELPH: 7.6 G/DL (ref 6.4–8.2)
MCH RBC QN AUTO: 30.8 PG (ref 26–34)
MCH RBC QN AUTO: 31.1 PG (ref 26–34)
MCH RBC QN AUTO: 31.4 PG (ref 26–34)
MCH RBC QN AUTO: 32.3 PG (ref 26–34)
MCH RBC QN AUTO: 32.6 PG (ref 26–34)
MCHC RBC AUTO-ENTMCNC: 32 G/DL (ref 31–37)
MCHC RBC AUTO-ENTMCNC: 32.2 G/DL (ref 31–37)
MCHC RBC AUTO-ENTMCNC: 32.5 G/DL (ref 31–37)
MCHC RBC AUTO-ENTMCNC: 32.8 G/DL (ref 31–37)
MCHC RBC AUTO-ENTMCNC: 32.8 G/DL (ref 31–37)
MCV RBC AUTO: 95.7 FL
MCV RBC AUTO: 96.3 FL (ref 80–100)
MCV RBC AUTO: 96.3 FL (ref 80–100)
MCV RBC AUTO: 99.5 FL (ref 80–100)
MCV RBC AUTO: 99.5 FL (ref 80–100)
MONOCYTES # BLD AUTO: 0.3 X10(3) UL (ref 0.1–1)
MONOCYTES # BLD AUTO: 0.33 X10(3) UL (ref 0.1–1)
MONOCYTES # BLD AUTO: 0.35 X10(3) UL (ref 0.1–1)
MONOCYTES # BLD AUTO: 0.35 X10(3) UL (ref 0.1–1)
MONOCYTES # BLD AUTO: 0.37 X10(3) UL (ref 0.1–1)
MONOCYTES NFR BLD AUTO: 6.5 %
MONOCYTES NFR BLD AUTO: 7.8 %
MONOCYTES NFR BLD AUTO: 8.6 %
MONOCYTES NFR BLD AUTO: 8.7 %
MONOCYTES NFR BLD AUTO: 9.1 %
NEUTROPHILS # BLD AUTO: 2.55 X10 (3) UL (ref 1.5–7.7)
NEUTROPHILS # BLD AUTO: 2.55 X10(3) UL (ref 1.5–7.7)
NEUTROPHILS # BLD AUTO: 2.58 X10 (3) UL (ref 1.5–7.7)
NEUTROPHILS # BLD AUTO: 2.58 X10(3) UL (ref 1.5–7.7)
NEUTROPHILS # BLD AUTO: 2.79 X10 (3) UL (ref 1.5–7.7)
NEUTROPHILS # BLD AUTO: 2.79 X10(3) UL (ref 1.5–7.7)
NEUTROPHILS # BLD AUTO: 2.84 X10 (3) UL (ref 1.5–7.7)
NEUTROPHILS # BLD AUTO: 2.84 X10(3) UL (ref 1.5–7.7)
NEUTROPHILS # BLD AUTO: 3.19 X10 (3) UL (ref 1.5–7.7)
NEUTROPHILS # BLD AUTO: 3.19 X10(3) UL (ref 1.5–7.7)
NEUTROPHILS NFR BLD AUTO: 60.7 %
NEUTROPHILS NFR BLD AUTO: 63.6 %
NEUTROPHILS NFR BLD AUTO: 68.6 %
NEUTROPHILS NFR BLD AUTO: 69 %
NEUTROPHILS NFR BLD AUTO: 69.5 %
OSMOLALITY SERPL CALC.SUM OF ELEC: 278 MOSM/KG (ref 275–295)
OSMOLALITY SERPL CALC.SUM OF ELEC: 279 MOSM/KG (ref 275–295)
OSMOLALITY SERPL CALC.SUM OF ELEC: 280 MOSM/KG (ref 275–295)
OSMOLALITY SERPL CALC.SUM OF ELEC: 281 MOSM/KG (ref 275–295)
OSMOLALITY SERPL CALC.SUM OF ELEC: 285 MOSM/KG (ref 275–295)
PLATELET # BLD AUTO: 132 10(3)UL (ref 150–450)
PLATELET # BLD AUTO: 139 10(3)UL (ref 150–450)
PLATELET # BLD AUTO: 154 10(3)UL (ref 150–450)
PLATELET # BLD AUTO: 159 10(3)UL (ref 150–450)
PLATELET # BLD AUTO: 178 10(3)UL (ref 150–450)
POTASSIUM SERPL-SCNC: 3.5 MMOL/L (ref 3.5–5.1)
POTASSIUM SERPL-SCNC: 3.7 MMOL/L (ref 3.5–5.1)
POTASSIUM SERPL-SCNC: 3.8 MMOL/L (ref 3.5–5.1)
POTASSIUM SERPL-SCNC: 4.2 MMOL/L (ref 3.5–5.1)
POTASSIUM SERPL-SCNC: 4.5 MMOL/L (ref 3.5–5.1)
RBC # BLD AUTO: 3.7 X10(6)UL
RBC # BLD AUTO: 3.74 X10(6)UL (ref 3.8–5.8)
RBC # BLD AUTO: 3.8 X10(6)UL (ref 3.8–5.8)
RBC # BLD AUTO: 3.81 X10(6)UL (ref 3.8–5.8)
RBC # BLD AUTO: 3.83 X10(6)UL (ref 3.8–5.8)
SODIUM SERPL-SCNC: 133 MMOL/L (ref 136–145)
SODIUM SERPL-SCNC: 135 MMOL/L (ref 136–145)
SODIUM SERPL-SCNC: 135 MMOL/L (ref 136–145)
SODIUM SERPL-SCNC: 136 MMOL/L (ref 136–145)
SODIUM SERPL-SCNC: 138 MMOL/L (ref 136–145)
WBC # BLD AUTO: 4 X10(3) UL (ref 4–11)
WBC # BLD AUTO: 4.1 X10(3) UL (ref 4–11)
WBC # BLD AUTO: 4.1 X10(3) UL (ref 4–11)
WBC # BLD AUTO: 4.3 X10(3) UL (ref 4–11)
WBC # BLD AUTO: 4.6 X10(3) UL (ref 4–11)

## 2020-01-01 PROCEDURE — 99215 OFFICE O/P EST HI 40 MIN: CPT | Performed by: INTERNAL MEDICINE

## 2020-01-01 PROCEDURE — 36415 COLL VENOUS BLD VENIPUNCTURE: CPT

## 2020-01-01 PROCEDURE — 85025 COMPLETE CBC W/AUTO DIFF WBC: CPT

## 2020-01-01 PROCEDURE — 80048 BASIC METABOLIC PNL TOTAL CA: CPT | Performed by: EMERGENCY MEDICINE

## 2020-01-01 PROCEDURE — 99214 OFFICE O/P EST MOD 30 MIN: CPT | Performed by: NURSE PRACTITIONER

## 2020-01-01 PROCEDURE — 80053 COMPREHEN METABOLIC PANEL: CPT

## 2020-01-01 PROCEDURE — 85025 COMPLETE CBC W/AUTO DIFF WBC: CPT | Performed by: EMERGENCY MEDICINE

## 2020-01-01 PROCEDURE — 71260 CT THORAX DX C+: CPT | Performed by: INTERNAL MEDICINE

## 2020-01-01 PROCEDURE — 82565 ASSAY OF CREATININE: CPT

## 2020-01-01 PROCEDURE — 87040 BLOOD CULTURE FOR BACTERIA: CPT | Performed by: EMERGENCY MEDICINE

## 2020-01-01 PROCEDURE — G9678 ONCOLOGY CARE MODEL SERVICE: HCPCS | Performed by: INTERNAL MEDICINE

## 2020-01-01 PROCEDURE — 96360 HYDRATION IV INFUSION INIT: CPT

## 2020-01-01 PROCEDURE — 99213 OFFICE O/P EST LOW 20 MIN: CPT | Performed by: INTERNAL MEDICINE

## 2020-01-01 PROCEDURE — G0463 HOSPITAL OUTPT CLINIC VISIT: HCPCS | Performed by: INTERNAL MEDICINE

## 2020-01-01 PROCEDURE — 81001 URINALYSIS AUTO W/SCOPE: CPT | Performed by: EMERGENCY MEDICINE

## 2020-01-01 PROCEDURE — 99441 PHONE E/M BY PHYS 5-10 MIN: CPT | Performed by: NURSE PRACTITIONER

## 2020-01-01 PROCEDURE — 71260 CT THORAX DX C+: CPT | Performed by: NURSE PRACTITIONER

## 2020-01-01 PROCEDURE — 99284 EMERGENCY DEPT VISIT MOD MDM: CPT

## 2020-01-01 PROCEDURE — 74177 CT ABD & PELVIS W/CONTRAST: CPT | Performed by: INTERNAL MEDICINE

## 2020-01-01 PROCEDURE — 74177 CT ABD & PELVIS W/CONTRAST: CPT | Performed by: NURSE PRACTITIONER

## 2020-01-01 RX ORDER — HYDROCODONE BITARTRATE AND ACETAMINOPHEN 5; 325 MG/1; MG/1
1 TABLET ORAL EVERY 4 HOURS PRN
Qty: 180 TABLET | Refills: 0 | Status: SHIPPED | OUTPATIENT
Start: 2020-01-01 | End: 2020-01-01

## 2020-01-01 RX ORDER — LORAZEPAM 2 MG/1
TABLET ORAL
Qty: 45 TABLET | Refills: 0 | Status: SHIPPED | OUTPATIENT
Start: 2020-01-01 | End: 2020-01-01

## 2020-01-01 RX ORDER — LORAZEPAM 2 MG/1
TABLET ORAL
Qty: 45 TABLET | Refills: 1 | Status: SHIPPED | OUTPATIENT
Start: 2020-01-01 | End: 2020-01-01

## 2020-01-01 RX ORDER — HYDROCODONE BITARTRATE AND ACETAMINOPHEN 5; 325 MG/1; MG/1
1 TABLET ORAL EVERY 4 HOURS PRN
Qty: 180 TABLET | Refills: 0 | Status: SHIPPED | OUTPATIENT
Start: 2020-01-01

## 2020-01-01 RX ORDER — LORAZEPAM 2 MG/1
TABLET ORAL
Qty: 45 TABLET | Refills: 0 | Status: SHIPPED | OUTPATIENT
Start: 2020-01-01

## 2020-01-01 RX ORDER — CABOZANTINIB 60 MG/1
60 TABLET ORAL DAILY
Qty: 30 TABLET | Refills: 5 | Status: SHIPPED | OUTPATIENT
Start: 2020-01-01

## 2020-01-01 RX ORDER — LORAZEPAM 2 MG/1
TABLET ORAL
Qty: 45 TABLET | Refills: 0 | Status: SHIPPED
Start: 2020-01-01 | End: 2020-01-01

## 2020-01-01 RX ORDER — LORAZEPAM 2 MG/1
TABLET ORAL
Qty: 54 TABLET | Refills: 0 | Status: SHIPPED | OUTPATIENT
Start: 2020-01-01 | End: 2020-01-01

## 2020-01-08 ENCOUNTER — NURSE ONLY (OUTPATIENT)
Dept: HEMATOLOGY/ONCOLOGY | Facility: HOSPITAL | Age: 70
End: 2020-01-08
Attending: INTERNAL MEDICINE
Payer: MEDICARE

## 2020-01-08 ENCOUNTER — OFFICE VISIT (OUTPATIENT)
Dept: HEMATOLOGY/ONCOLOGY | Facility: HOSPITAL | Age: 70
End: 2020-01-08
Attending: NURSE PRACTITIONER
Payer: MEDICARE

## 2020-01-08 VITALS
WEIGHT: 149.25 LBS | TEMPERATURE: 98 F | DIASTOLIC BLOOD PRESSURE: 82 MMHG | BODY MASS INDEX: 23.99 KG/M2 | HEART RATE: 100 BPM | HEIGHT: 66 IN | RESPIRATION RATE: 18 BRPM | OXYGEN SATURATION: 98 % | SYSTOLIC BLOOD PRESSURE: 133 MMHG

## 2020-01-08 DIAGNOSIS — C64.9 METASTATIC RENAL CELL CARCINOMA, UNSPECIFIED LATERALITY (HCC): ICD-10-CM

## 2020-01-08 DIAGNOSIS — Z51.11 CHEMOTHERAPY MANAGEMENT, ENCOUNTER FOR: Primary | ICD-10-CM

## 2020-01-08 DIAGNOSIS — K59.03 THERAPEUTIC OPIOID INDUCED CONSTIPATION: ICD-10-CM

## 2020-01-08 DIAGNOSIS — L27.1 PALMAR PLANTAR ERYTHRODYSAESTHESIA: ICD-10-CM

## 2020-01-08 DIAGNOSIS — C78.00 MALIGNANT NEOPLASM METASTATIC TO LUNG, UNSPECIFIED LATERALITY (HCC): ICD-10-CM

## 2020-01-08 DIAGNOSIS — T40.2X5A THERAPEUTIC OPIOID INDUCED CONSTIPATION: ICD-10-CM

## 2020-01-08 DIAGNOSIS — G89.3 CANCER RELATED PAIN: Primary | ICD-10-CM

## 2020-01-08 LAB
ALBUMIN SERPL-MCNC: 3 G/DL (ref 3.4–5)
ALBUMIN/GLOB SERPL: 0.7 {RATIO} (ref 1–2)
ALP LIVER SERPL-CCNC: 81 U/L (ref 45–117)
ALT SERPL-CCNC: 26 U/L (ref 16–61)
ANION GAP SERPL CALC-SCNC: 4 MMOL/L (ref 0–18)
AST SERPL-CCNC: 26 U/L (ref 15–37)
BASOPHILS # BLD AUTO: 0.02 X10(3) UL (ref 0–0.2)
BASOPHILS NFR BLD AUTO: 0.5 %
BILIRUB SERPL-MCNC: 0.5 MG/DL (ref 0.1–2)
BUN BLD-MCNC: 10 MG/DL (ref 7–18)
BUN/CREAT SERPL: 13.2 (ref 10–20)
CALCIUM BLD-MCNC: 7.6 MG/DL (ref 8.5–10.1)
CHLORIDE SERPL-SCNC: 102 MMOL/L (ref 98–112)
CO2 SERPL-SCNC: 30 MMOL/L (ref 21–32)
CREAT BLD-MCNC: 0.76 MG/DL (ref 0.7–1.3)
DEPRECATED RDW RBC AUTO: 62.4 FL (ref 35.1–46.3)
EOSINOPHIL # BLD AUTO: 0.02 X10(3) UL (ref 0–0.7)
EOSINOPHIL NFR BLD AUTO: 0.5 %
ERYTHROCYTE [DISTWIDTH] IN BLOOD BY AUTOMATED COUNT: 17.2 % (ref 11–15)
GLOBULIN PLAS-MCNC: 4.4 G/DL (ref 2.8–4.4)
GLUCOSE BLD-MCNC: 136 MG/DL (ref 70–99)
HCT VFR BLD AUTO: 39.1 % (ref 39–53)
HGB BLD-MCNC: 12.5 G/DL (ref 13–17.5)
IMM GRANULOCYTES # BLD AUTO: 0.02 X10(3) UL (ref 0–1)
IMM GRANULOCYTES NFR BLD: 0.5 %
LYMPHOCYTES # BLD AUTO: 1.21 X10(3) UL (ref 1–4)
LYMPHOCYTES NFR BLD AUTO: 29.4 %
M PROTEIN MFR SERPL ELPH: 7.4 G/DL (ref 6.4–8.2)
MCH RBC QN AUTO: 32 PG (ref 26–34)
MCHC RBC AUTO-ENTMCNC: 32 G/DL (ref 31–37)
MCV RBC AUTO: 100 FL (ref 80–100)
MONOCYTES # BLD AUTO: 0.35 X10(3) UL (ref 0.1–1)
MONOCYTES NFR BLD AUTO: 8.5 %
NEUTROPHILS # BLD AUTO: 2.49 X10 (3) UL (ref 1.5–7.7)
NEUTROPHILS # BLD AUTO: 2.49 X10(3) UL (ref 1.5–7.7)
NEUTROPHILS NFR BLD AUTO: 60.6 %
OSMOLALITY SERPL CALC.SUM OF ELEC: 283 MOSM/KG (ref 275–295)
PLATELET # BLD AUTO: 136 10(3)UL (ref 150–450)
POTASSIUM SERPL-SCNC: 4.2 MMOL/L (ref 3.5–5.1)
RBC # BLD AUTO: 3.91 X10(6)UL (ref 3.8–5.8)
SODIUM SERPL-SCNC: 136 MMOL/L (ref 136–145)
WBC # BLD AUTO: 4.1 X10(3) UL (ref 4–11)

## 2020-01-08 PROCEDURE — 99215 OFFICE O/P EST HI 40 MIN: CPT | Performed by: INTERNAL MEDICINE

## 2020-01-08 PROCEDURE — 85025 COMPLETE CBC W/AUTO DIFF WBC: CPT

## 2020-01-08 PROCEDURE — 99214 OFFICE O/P EST MOD 30 MIN: CPT | Performed by: NURSE PRACTITIONER

## 2020-01-08 PROCEDURE — 80053 COMPREHEN METABOLIC PANEL: CPT

## 2020-01-08 PROCEDURE — 36415 COLL VENOUS BLD VENIPUNCTURE: CPT

## 2020-01-08 RX ORDER — HYDROCODONE BITARTRATE AND ACETAMINOPHEN 5; 325 MG/1; MG/1
1 TABLET ORAL EVERY 4 HOURS PRN
Qty: 180 TABLET | Refills: 0 | Status: SHIPPED | OUTPATIENT
Start: 2020-01-08 | End: 2020-01-08

## 2020-01-08 RX ORDER — HYDROCODONE BITARTRATE AND ACETAMINOPHEN 5; 325 MG/1; MG/1
1 TABLET ORAL EVERY 4 HOURS PRN
Qty: 180 TABLET | Refills: 0 | Status: SHIPPED | OUTPATIENT
Start: 2020-01-01 | End: 2020-01-01

## 2020-01-08 RX ORDER — HYDROCODONE BITARTRATE AND ACETAMINOPHEN 5; 325 MG/1; MG/1
1-2 TABLET ORAL
Qty: 180 TABLET | Refills: 0 | Status: SHIPPED | OUTPATIENT
Start: 2020-01-08 | End: 2020-01-08

## 2020-01-08 NOTE — PROGRESS NOTES
Cancer Center Progress Note    Patient Name: Britt Jane   YOB: 1950   Medical Record Number: [id number]   Attending Physician: Stacy Goss M.D.        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 No hepatosplenomegaly. No palpable mass. Extremities: No edema. Neurological: 5/5 motor x4.       Laboratory:  Recent Labs   Lab 04/22/19  0750   WBC 2.7*   HGB 10.0*   .0   NE 1.81           Lab Results

## 2020-01-08 NOTE — PROGRESS NOTES
Palliative Care Follow Up Note     Patient Name: Corina Rodrigues   YOB: 1950   Medical Record Number: [id number]   Date of visit: 1/8/2020    Chief Complaint/Reason for Visit:  Patient presents with:  Palliative Care     History of P 1965    traumatic, x 3   • WRIST FRACTURE SURGERY Left 2003       Allergies:  No Known Allergies    Family History:  Family History   Problem Relation Age of Onset   • Cancer Sister    • Cancer Brother        Social History:  Social History    Socioeconomi OR Take 81 mg by mouth daily. Review of Systems:  Review of Systems   Constitutional: Negative for chills, diaphoresis, fever, malaise/fatigue and weight loss.         Appetite improving; drinks 1-2 supplemental protein shakes/day    4 pound weigh Eyes:      Pupils: Pupils are equal, round, and reactive to light. Neck:      Musculoskeletal: Normal range of motion and neck supple. Cardiovascular:      Rate and Rhythm: Normal rate and regular rhythm. Pulses: Normal pulses.       Heart sounds tolerance  -Controlled Substance Agreement reviewed with pt/family today and signed by pt during initial visit on 2/26/19  -Reviewed [place]   -Discussed MOA and explained side effects of pain medications  -Rx provided for Norco 5/325 1 tabs Q4H/PRN for pain

## 2020-01-10 ENCOUNTER — TELEPHONE (OUTPATIENT)
Dept: INTERNAL MEDICINE CLINIC | Facility: CLINIC | Age: 70
End: 2020-01-10

## 2020-01-12 RX ORDER — LORAZEPAM 2 MG/1
TABLET ORAL
Qty: 45 TABLET | Refills: 0 | Status: SHIPPED | OUTPATIENT
Start: 2020-01-12 | End: 2020-01-01

## 2020-01-17 NOTE — TELEPHONE ENCOUNTER
Patients daughter calling back, states his refill was sent, but he needed 9 additional pills until he comes back from [facility] on the 6th. Patient will be leaving on 01/20.   Daughter requesting call to confirm

## 2020-01-18 NOTE — TELEPHONE ENCOUNTER
Dr Natalie Prestoniza, daughter explained, patient needs extra 9 pills to be released now as he leaves Monday on trip, he has January pills, but needs extra to cover those days in Feb when he is gone. Daughter will get Feb pills for him. Advised daughter that insurance may/may not cover early release if doctor agrees, may need to pay for 9 generic pills; she agreed.     Pharmacy     [facility] [address] [place] [address] [phone], [phone]   Outpatient Medication Detail      Disp Refills Start End    LORazepam 2 MG Oral Tab 54 tablet 0 1/18/2020     Sig: TAKE 1/2 TABLET BY MOUTH IN THE MORNING, AND 1 TABLET AT BEDTIME    Sent to pharmacy as: LORazepam 2 MG Oral Tablet (ATIVAN)    Notes to Pharmacy: On call MD order    E-Prescribing Status: Receipt confirmed by pharmacy (1/18/2020 10:41 AM CST)

## 2020-01-18 NOTE — TELEPHONE ENCOUNTER
Dr Colette Gonzáles, [facility]. Called pharmacy, pharmacist Lawanda Brody said the 1/18/2020 script is scheduled for 1/29/2020. Patient has a 1/12/2020 script for the February dose ready for him. Lawanda Brody will use the 1/18/2020 script to get the patient the 9 pills needed for the trip. Lawanda Brody understands that the patient already has his January pills, is not asking for extra January pills, just the 9 pills to cover the days he's on his trip in February until he gets home. Notified Adelia (NICK).

## 2020-02-10 NOTE — PROGRESS NOTES
Palliative Care Follow Up Note     Patient Name: Colt Bazan   YOB: 1950   Medical Record Number: [id number]   Date of visit: 2/10/2020    Chief Complaint/Reason for Visit:  Patient presents with:  Palliative Care     History of 6/12/2019       Surgical History:  Past Surgical History:   Procedure Laterality Date   • AMPUTATION FINGER/THUMB Right 1965    traumatic, x 3   • WRIST FRACTURE SURGERY Left 2003       Allergies:  No Known Allergies    Family History:  Family History   Pr Take 1 tablet (10 mg total) by mouth every 6 (six) hours as needed for Nausea. 60 tablet 3   • ASPIRIN OR Take 81 mg by mouth daily. Review of Systems:  Review of Systems   Constitutional: Positive for weight loss.  Negative for chills, diaphoresi diaphoretic. Comments: Faroese-speaking male,thin, chronically-ill appearing   HENT:      Head: Normocephalic and atraumatic. Eyes:      Pupils: Pupils are equal, round, and reactive to light.    Neck:      Musculoskeletal: Normal range of motion and Substance Agreement reviewed with pt/family today and signed by pt during initial visit on 2/26/19  -Reviewed [place]   -Discussed MOA and explained side effects of pain medications  -Rx provided for Norco 5/325 1 tabs Q4H/PRN for pain (pt still has tabs lef

## 2020-02-10 NOTE — PROGRESS NOTES
Cancer Center Progress Note    Patient Name: Robbie Carbajal   YOB: 1950   Medical Record Number: [id number]   Attending Physician: Cristela Paulson M.D.        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 No hepatosplenomegaly. No palpable mass. Extremities: No edema. Neurological: 5/5 motor x4.       Laboratory:  Recent Labs   Lab 04/22/19  0750   WBC 2.7*   HGB 10.0*   .0   NE 1.81           Lab Results   Compon

## 2020-03-06 NOTE — PROGRESS NOTES
Cancer Center Progress Note    Patient Name: Dilcia Marsh   YOB: 1950   Medical Record Number: [id number]   Attending Physician: Sheila Delgado M.D.        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 No hepatosplenomegaly. No palpable mass. Extremities: No edema. Neurological: 5/5 motor x4.       Laboratory:  Recent Labs   Lab 04/22/19  0750   WBC 2.7*   HGB 10.0*   .0   NE 1.81           Lab Results   Compon

## 2020-03-06 NOTE — PROGRESS NOTES
Palliative Care Follow Up Note     Patient Name: Levan Nageotte   YOB: 1950   Medical Record Number: [id number]   Date of visit: 3/6/2020    Chief Complaint/Reason for Visit:  Patient presents with:  Palliative Care     History of P Date   • AMPUTATION FINGER/THUMB Right 1965    traumatic, x 3   • WRIST FRACTURE SURGERY Left 2003       Allergies:  No Known Allergies    Family History:  Family History   Problem Relation Age of Onset   • Cancer Sister    • Cancer Brother        Social H tablet 3   • ASPIRIN OR Take 81 mg by mouth daily. Review of Systems:  Review of Systems   Constitutional: Positive for weight loss. Negative for chills, diaphoresis, fever and malaise/fatigue.         Appetite improving; drinks 1-2 supplemental p Head: Normocephalic and atraumatic. Eyes:      Pupils: Pupils are equal, round, and reactive to light. Neck:      Musculoskeletal: Normal range of motion and neck supple. Cardiovascular:      Rate and Rhythm: Normal rate and regular rhythm.       Pu Pain  -Discussed addiction vs tolerance  -Controlled Substance Agreement reviewed with pt/family today and signed by pt during initial visit on 2/26/19  -Reviewed [place]   -Discussed MOA and explained side effects of pain medications  -Rx sent to pharmacy f

## 2020-03-26 NOTE — TELEPHONE ENCOUNTER
This is being sent to you without review by the Triage staff due to the high call volumes created by the COVID-19 virus, per the email sent by Dr. Edilma Eisenberg on 3/19/20. Thank you for your support.     Centralized Nurse Triage Team

## 2020-04-23 NOTE — PROGRESS NOTES
Virtual/Telephone Check-In    [id number] [address] Hayes verbally consents to a Virtual/Telephone Check-In service on 04/23/20.   Patient understands and accepts financial responsibility for any deductible, co-insurance and/or co-pays associated with this ser Allergies      Current Outpatient Medications:   •  HYDROcodone-acetaminophen 5-325 MG Oral Tab, Take 1 tablet by mouth every 4 (four) hours as needed for Pain., Disp: 180 tablet, Rfl: 0  •  LORazepam 2 MG Oral Tab, TAKE 1/2 TABLET BY MOUTH IN THE MORNING, Yes      Comment: nothing in last 6 months (12/2018)    Drug use: No       CBC:    Lab Results   Component Value Date    WBC 4.5 04/23/2020    WBC 4.0 03/06/2020    WBC 4.1 02/10/2020     Lab Results   Component Value Date    HGB 12.7 (L) 04/23/2020    HGB completed using two-way, real-time interactive audio and/or video communication.   This has been done in good nadira to provide continuity of care in the best interest of the provider-patient relationship, due to the ongoing public health crisis/national meera

## 2020-04-24 NOTE — PROGRESS NOTES
Virtual Telephone Check-In    [id number] [address] Hayes verbally consents to a Virtual/Telephone Check-In visit on 04/24/20.     Patient understands and accepts financial responsibility for any deductible, co-insurance and/or co-pays associated with this ser Past Surgical History:   Procedure Laterality Date   • AMPUTATION FINGER/THUMB Right 1965     traumatic, x 3   • WRIST FRACTURE SURGERY Left 2003         Allergies:  No Known Allergies     Family History:        Family History   Problem Relation Age of O (10 mg total) by mouth every 6 (six) hours as needed for Nausea. 60 tablet 3   • ASPIRIN OR Take 81 mg by mouth daily.               Review of Systems:  Review of Systems   Constitutional: Weight stable.  Negative for chills, diaphoresis, fever and malaise/ constipation     3. Malignant neoplasm metastatic to lung, unspecified laterality ([facility] [address]     4.  Metastatic renal cell carcinoma, unspecified laterality (HCC)     Cancer Related Pain  -Discussed addiction vs tolerance  -Controlled Substance Agreement reviewe

## 2020-04-24 NOTE — TELEPHONE ENCOUNTER
Call son with schedule for CT for 5/20/20 at 0900 with Arrival at [address] [place] [address], to [employer], to [facility] 2 hours prior to testing.  5/22/20 - labs apt at 1100 followed by MD visit at [phone]. They verbalizes understanding.

## 2020-05-12 NOTE — TELEPHONE ENCOUNTER
[employer] financial [employer] called saying the patient's eligibility will be ending May 21 2020. They are unable to reach the patient, and would like to know if the patient needs to be reenrolled for assistance.  They said to reenroll they just need to speak w

## 2020-05-13 NOTE — TELEPHONE ENCOUNTER
Spoke to son Mary Artist. He states that he spoke with the financial company yesterday and was told that all was good on their end. I told him I would send a new script over to [facility]. Verbalized understanding.

## 2020-05-20 NOTE — TELEPHONE ENCOUNTER
Called son for CT scheduled for tomorrow at 130 pm with arrival at 115 to green parking lot to [address], [facility] 2 hours prior to test, they verbalizes understanding.

## 2020-05-22 NOTE — PROGRESS NOTES
Palliative Care Follow Up Note     Patient Name: Memo Paz   YOB: 1950   Medical Record Number: [id number]   Date of visit: 5/22/2020    Chief Complaint/Reason for Visit:  Patient presents with:  Palliative Care     History of FINGER/THUMB Right 1965    traumatic, x 3   • WRIST FRACTURE SURGERY Left 2003       Allergies:  No Known Allergies    Family History:  Family History   Problem Relation Age of Onset   • Cancer Sister    • Cancer Brother        Social History:  Social Hist OR Take 81 mg by mouth daily. Review of Systems:  Review of Systems   Constitutional: Negative for chills, diaphoresis, fever, malaise/fatigue and weight loss.         Appetite improving; drinks 1-2 supplemental protein shakes/day    4 pound weigh Eyes:      Pupils: Pupils are equal, round, and reactive to light. Neck:      Musculoskeletal: Normal range of motion and neck supple. Cardiovascular:      Rate and Rhythm: Normal rate and regular rhythm. Pulses: Normal pulses.       Heart sounds   -Discussed MOA and explained side effects of pain medications  -Rx sent to pharmacy for Norco 5/325 1 tabs Q4H/PRN for pain  -Pt did not want to start [facility] ER for pain relief  -Advised pt/family that pt is to NOT EXCEED 4,000mg Tylenol/Acetaminophen per

## 2020-05-22 NOTE — PROGRESS NOTES
Cancer Center Progress Note    Patient Name: Oralia Cisneros   YOB: 1950   Medical Record Number: [id number]   Attending Physician: Nadia Diaz M.D.        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  Take on an empty stomach, avoid meals 2 hours before and 1 hour after the medication 04/22/19  0750   WBC 2.7*   HGB 10.0*   .0   NE 1.81           Lab Results   Component Value Date     (H) 05/22/2020    BUN 9 05/22/2020    BUNCREA 11.4 05/22/2020    CREATSERUM 0.79 05/22/2020    ANIONGAP 4 05/22/2020    GFRNAA 91 05/22/2020

## 2020-06-15 NOTE — TELEPHONE ENCOUNTER
The patient's daughter Jose Alejandro Villegas called asking if his lab, palliative care, and f/u could be moved to earlier in the day Friday, later in the day, or moved to Thursday.

## 2020-06-18 NOTE — PROGRESS NOTES
Cancer Center Progress Note    Patient Name: Colt Bazan   YOB: 1950   Medical Record Number: [id number]   Attending Physician: Fredrick Escudero M.D.        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 of education: Not on file      Highest education level: Not on file    Occupational History      Occupation: Ar[employer]        Comment: retired    Social Needs      Financial resource strain: Not on file      Food insecurity:        Worry: Not on file Narrative      Not on file        Current Medications:    Current Outpatient Medications:   •  LORAZEPAM 2 MG Oral Tab, TAKE 1/2 TABLET BY MOUTH IN THE MORNING, AND 1 TABLET AT BEDTIME, Disp: 45 tablet, Rfl: 1  •  Cabozantinib S-Malate (CABOMETYX) 60 MG Or 04/22/19  0750   WBC 2.7*   HGB 10.0*   .0   NE 1.81           Lab Results   Component Value Date    GLU 81 06/18/2020    BUN 11 06/18/2020    BUNCREA 10.6 06/18/2020    CREATSERUM 1.04 06/18/2020    ANIONGAP 4 06/18/2020    GFRNAA 72 06/18/2020

## 2020-06-18 NOTE — PROGRESS NOTES
Palliative Care Follow Up Note     Patient Name: Lizzie Solis   YOB: 1950   Medical Record Number: [id number]   Date of visit: 6/18/2020    Chief Complaint/Reason for Visit:  Patient presents with:  Palliative Care     History of Sepsis ([facility] [address] 6/12/2019       Surgical History:  Past Surgical History:   Procedure Laterality Date   • AMPUTATION FINGER/THUMB Right 1965    traumatic, x 3   • WRIST FRACTURE SURGERY Left 2003       Allergies:  No Known Allergies    Family History:  Family mg tablet Take 1 tablet (10 mg total) by mouth every 6 (six) hours as needed for Nausea. 60 tablet 3   • ASPIRIN OR Take 81 mg by mouth daily.            Review of Systems:  Review of Systems   Constitutional: Negative for chills, diaphoresis, fever, malais Comments: Macanese-speaking male,thin, chronically-ill appearing   HENT:      Head: Normocephalic and atraumatic. Eyes:      Pupils: Pupils are equal, round, and reactive to light. Neck:      Musculoskeletal: Normal range of motion and neck supple.    Ca Substance Agreement reviewed with pt/family today and signed by pt during initial visit on 2/26/19  -Reviewed [place]   -Discussed MOA and explained side effects of pain medications  -Continue Norco 5/325mg- refill due on 6/22/2020- will send Rx to pharmacy

## 2020-06-18 NOTE — TELEPHONE ENCOUNTER
Just a FYI patients Cabozantinib S-Malate (CABOMETYX) 60 MG Oral Tab will ship 6/18.  Thank you olga

## 2020-07-17 NOTE — PROGRESS NOTES
Cancer Center Progress Note    Patient Name: Grupo Lea   YOB: 1950   Medical Record Number: [id number]   Attending Physician: Taiwo Kelley M.D.        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 No palpable mass. Extremities: No edema. Neurological: 5/5 motor x4.       Laboratory:  Recent Labs   Lab 04/22/19  0750   WBC 2.7*   HGB 10.0*   .0   NE 1.81           Lab Results   Component Value Date     (H) 07/17/20

## 2020-07-17 NOTE — PROGRESS NOTES
Palliative Care Follow Up Note     Patient Name: Franck Swenson   YOB: 1950   Medical Record Number: [id number]   Date of visit: 7/17/2020    Chief Complaint/Reason for Visit:  Patient presents with:  Palliative Care     History of traumatic, x 3   • WRIST FRACTURE SURGERY Left 2003       Allergies:  No Known Allergies    Family History:  Family History   Problem Relation Age of Onset   • Cancer Sister    • Cancer Brother        Social History:  Social History    Socioeconomic Histor daily.           Review of Systems:  Review of Systems   Constitutional: Positive for malaise/fatigue. Negative for chills, diaphoresis, fever and weight loss.         Appetite slightly decreased since last visit; drinks 1-2 supplemental protein shakes/day and atraumatic. Eyes:      Pupils: Pupils are equal, round, and reactive to light. Neck:      Musculoskeletal: Normal range of motion and neck supple. Cardiovascular:      Rate and Rhythm: Normal rate and regular rhythm. Pulses: Normal pulses.   -Discussed MOA and explained side effects of pain medications  -Continue Norco 5/325mg- refill due on 6/22/2020- will send Rx to pharmacy on that date  -Advised pt/family that pt is to NOT EXCEED 4,000mg Tylenol/Acetaminophen per day  -Continue Ibupro

## 2020-08-20 NOTE — PROGRESS NOTES
Cancer Center Progress Note    Patient Name: Gee Beaz   YOB: 1950   Medical Record Number: [id number]   Attending Physician: Ava Ellington M.D.        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 No hepatosplenomegaly. No palpable mass. Extremities: No edema. Neurological: 5/5 motor x4.       Laboratory:  Recent Labs   Lab 04/22/19  0750   WBC 2.7*   HGB 10.0*   .0   NE 1.81           Lab Results   Compon

## 2020-09-03 NOTE — TELEPHONE ENCOUNTER
Patient's daughter called to request a refill for the following medication.     LORAZEPAM 2 MG Oral Tab

## 2020-09-18 NOTE — TELEPHONE ENCOUNTER
Patient's daughter called saying his scan was scheduled for 9/26 so he needs to reschedule his 9/25 lab, palliative care, and f/u.  Please call

## 2020-09-29 NOTE — PROGRESS NOTES
Cancer Center Progress Note    Patient Name: Sravani Son   YOB: 1950   Medical Record Number: [id number]   Attending Physician: Charly Ge M.D.        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 Normal S1S2  Abdomen: Soft, non tender. No hepatosplenomegaly. No palpable mass. Extremities: No edema. Neurological: 5/5 motor x4.       Laboratory:  Recent Labs   Lab 04/22/19  0750   WBC 2.7*   HGB 10.0*   .0   NE 1.81           Lab R Metastatic renal cell carcinoma chemotherapy encounter    Irineo Yeboah MD

## 2020-09-30 NOTE — PROGRESS NOTES
Palliative Care Follow Up Note     Patient Name: Janette Cobb   YOB: 1950   Medical Record Number: [id number]   Date of visit: 9/29/2020    Chief Complaint/Reason for Visit:  Patient presents with:  Palliative Care     History of 2003       Allergies:  No Known Allergies    Family History:  Family History   Problem Relation Age of Onset   • Cancer Sister    • Cancer Brother        Social History:  Social History    Socioeconomic History      Marital status:       Spouse name Systems   Constitutional: Positive for malaise/fatigue. Negative for chills, diaphoresis, fever and weight loss.         Appetite slightly decreased since last visit; drinks 1-2 supplemental protein shakes/day    2 pound weight loss since last PC visit    C are equal, round, and reactive to light. Neck:      Musculoskeletal: Normal range of motion and neck supple. Cardiovascular:      Rate and Rhythm: Normal rate and regular rhythm. Pulses: Normal pulses. Heart sounds: Normal heart sounds.  No mu vs tolerance  -Controlled Substance Agreement reviewed with pt/family today and signed by pt during initial visit on 2/26/19  -Reviewed [place]   -Discussed MOA and explained side effects of pain medications  -Continue Norco 5/325mg- refill sent to pharmacy

## 2020-10-05 NOTE — TELEPHONE ENCOUNTER
Patient  calling to check the status of the medication he took last last pill October 4        Pleaser advise   [phone]

## 2020-11-03 NOTE — PROGRESS NOTES
Cancer Center Progress Note    Patient Name: Robbie Carbajal   YOB: 1950   Medical Record Number: [id number]   Attending Physician: Cristela Paulson M.D.        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 of children: 3      Years of education: Not on file      Highest education level: Not on file    Occupational History      Occupation:         Comment: retired    Social Needs      Financial resource strain: Not on [employer] insecurity        W Social History Narrative      Not on file        Current Medications:    Current Outpatient Medications:   •  HYDROcodone-acetaminophen 5-325 MG Oral Tab, Take 1 tablet by mouth every 4 (four) hours as needed for Pain., Disp: 180 tablet, Rfl: 0  •  LORAZEP and rhythm. Normal S1S2  Abdomen: Soft, non tender. No hepatosplenomegaly. No palpable mass. Extremities: No edema. Neurological: 5/5 motor x4.       Laboratory:  Recent Labs   Lab 04/22/19  0750   WBC 2.7*   HGB 10.0*   .0   NE 1.81           L Metastatic renal cell carcinoma chemotherapy encounter    Sloane Santos MD

## 2020-11-03 NOTE — PROGRESS NOTES
Palliative Care Follow Up Note     Patient Name: Daisha Escobar   YOB: 1950   Medical Record Number: [id number]   Date of visit: 11/3/2020    Chief Complaint/Reason for Visit:  Patient presents with:  Palliative Care     History of Right 1965    traumatic, x 3   • WRIST FRACTURE SURGERY Left 2003       Allergies:  No Known Allergies    Family History:  Family History   Problem Relation Age of Onset   • Cancer Sister    • Cancer Brother        Social History:  Social History    Socioe by mouth daily. Review of Systems:  Review of Systems   Constitutional: Positive for malaise/fatigue. Negative for chills, diaphoresis, fever and weight loss.         Decreased appetite    6 pound weight loss since last PC visit    Completed RT round, and reactive to light. Neck:      Musculoskeletal: Normal range of motion and neck supple. Cardiovascular:      Rate and Rhythm: Normal rate and regular rhythm. Pulses: Normal pulses. Heart sounds: Normal heart sounds. No murmur.    Pul pain  -Discussed addiction vs tolerance  -Controlled Substance Agreement reviewed with pt/family today and signed by pt during initial visit on 2/26/19  -[place]ed [place]   -Discussed MOA and explained side effects of pain medications  -Continue Norco 5/325m

## 2020-11-04 NOTE — TELEPHONE ENCOUNTER
Rosette Strauss [facility] [place] [place] [phone], [phone] ([address], ) need a refill for Cabometyx 60 mg pap, Qty: 30, Sig: Take one tablet by mouth once daily.  Take on an empty stomach, avoid meals 2 h

## 2020-11-11 NOTE — ED PROVIDER NOTES
Patient Seen in: [facility] AND [facility] Emergency Department    History   Patient presents with:  Fever      HPI    Patient with a history of metastatic renal cancer, diabetes and hyperlipidemia presents to the ED after feeling generalized weakness maulik childers reviewed and are negative. Constitutional and vital signs reviewed. Social History and Family History elements reviewed from today, pertinent positives to the presenting problem noted.     Physical Exam     ED Triage Vitals [11/10/20 1846]   / components within normal limits   BASIC METABOLIC PANEL (8) - Abnormal; Notable for the following components:    Glucose 123 (*)     Sodium 131 (*)     Calcium, Total 7.6 (*)     Calculated Osmolality 273 (*)     All other components within normal limits illness    Medical Record Review: I personally reviewed available prior medical records for any recent pertinent discharge summaries, testing, and procedures and reviewed those reports. Complicating Factors:  The patient already has does not have any per

## 2020-11-11 NOTE — TELEPHONE ENCOUNTER
Patient roomed. Wife present with patient. Asked Covid-19 screening questions. Wife denies herself or patient have had any fevers, coughing, sore throat, severe Headaches or difficulty breathing or know exposure to anyone sick.  Took patient vitals per room

## 2020-11-12 NOTE — PROGRESS NOTES
HPI:    Patient ID: Astrid Nava is a 79year old male.     Patient was here for regular checkup/ffup for his JOSE and DM however when vitals were taken, pt was noted by nursel shakira that he was febrile with temp at 100.3F; pt has occasional coug ASPIRIN OR Take 81 mg by mouth daily.          Allergies:No Known Allergies    HISTORY:  Past Medical History:   Diagnosis Date   • Anxiety    • Cancer ([facility]     RCC   • Diabetes ([facility]    • Diabetes type 2, controlled ([facility]    • Encephalopathy    • Hyperlip [facility])  Plan: had been ff and treated by oncologist Dr Tirso Sheffield    (F41.1) JOSE (generalized anxiety disorder)  Plan: had been stable on lorazepam. cpm .    (E11.9) Controlled type 2 diabetes mellitus without complication, without long-term current use of insuli

## 2020-11-16 NOTE — TELEPHONE ENCOUNTER
Left message in both Croatian and English to call back to inform of below, and sent MyChart message.

## 2020-11-17 NOTE — TELEPHONE ENCOUNTER
Male voice picked up the phone, states his name is Ivelisse Ruiz and he is the son. States that patient still sleeping and not able to sleep good last night,will call us back once patient is awake.     TRIAGE team will do home monitoring   PLEASE DO NOT CLOSE TH

## 2020-11-21 NOTE — TELEPHONE ENCOUNTER
Triage team will monitor patient . Please DO NOT close this encounter.      With [facility]  Gordon Smallwood ID# [id number]    Left message to call back   Did not read PageSt[employer]

## 2020-11-23 NOTE — TELEPHONE ENCOUNTER
Triage team will monitor patient . Please DO NOT close this encounter    With [address] [place] [address] ID# [id number]    Left message to call back on patient's voicemail. Left message to call back on son [employer].     Skyriderhart Message has not been

## 2020-11-25 NOTE — TELEPHONE ENCOUNTER
Patient read [employer] messages. Encounter closed-no response.           RE:(No subject)  Message [id number]  From   Christopher Hannon RN To   Francisco Phillip Sent and Delivered   11/17/2020 11:20 AM   Last Read in [employer]   11/17/2020 11:30 AM by Camila Engle

## 2020-12-16 NOTE — TELEPHONE ENCOUNTER
Patient's daughter called and stated that her dad is feeling very weak. Please call Ashish Darby the daughter of the patient. Thank you.  Lucinda

## 2020-12-16 NOTE — TELEPHONE ENCOUNTER
Daughter called to reschedule Francisco appointments that he missed on 12/15 and I got him scheduled for 12/17. Did you want to schedule his palliative care.

## 2020-12-16 NOTE — TELEPHONE ENCOUNTER
Toxicities: Cabozantinib       Patient was due to have labs, see Dr Yaneli Hopper, MARIBEL. Family forgot about the appointments until later in the day. Daughter Brenda Salter wants to reschedule the appointments for today or this week.     Weakness: Gabino Kim reports her

## 2020-12-17 NOTE — PROGRESS NOTES
Cancer Center Progress Note    Patient Name: Delio Hollis   YOB: 1950   Medical Record Number: [id number]   Attending Physician: Pako Howard M.D.        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       Spouse name: Not on file      Number of children: 3      Years of education: Not on file      Highest education level: Not on file    Occupational History      Occupation: [employer]: retired    Social Needs      Financial resource Belt: Not Asked        Self-Exams: Not Asked    Social History Narrative      Not on file        Current Medications:    Current Outpatient Medications:   •  HYDROcodone-acetaminophen 5-325 MG Oral Tab, Take 1 tablet by mouth every 4 (four) hours as needed is supple. Lymphatics: There is no palpable peripheral lymphadenopathy   Chest: Clear to auscultation. Cardiovascular: Regular rate and rhythm. Normal S1S2  Abdomen: Soft, non tender. No hepatosplenomegaly. No palpable mass. Extremities: No edema.   Madalyn Dalton –Hyponatremia decrease free water.   Improved  –Cancer related pain following with palliative care  –Mild anemia okay to monitor    RTC 1 month    Risk Assessment: Metastatic renal cell carcinoma chemotherapy encounter    Douglas Mason MD

## 2020-12-17 NOTE — PROGRESS NOTES
Palliative Care Follow Up Note     Patient Name: Elijah Bartlett   YOB: 1950   Medical Record Number: [id number]   Date of visit: 12/17/2020    Chief Complaint/Reason for Visit:  Patient presents with:  Palliative Care     History of Diagnosis Date   • Anxiety    • Cancer [facility])     RCC   • Diabetes ([facility]    • Diabetes type 2, controlled ([facility]    • Encephalopathy    • Hyperlipidemia    • Sepsis ([facility] 6/12/2019       Surgical History:  Past Surgical History:   Procedure Laterality Date needed for Nausea. 60 tablet 3   • ASPIRIN OR Take 81 mg by mouth daily. • Senna-Docusate Sodium 8.6-50 MG Oral Tab Take 2 tablets by mouth nightly.  (Patient not taking: Reported on 11/10/2020 ) 60 tablet 3   • ondansetron 8 MG Oral Tablet Dispersibl patient is not nervous/anxious. Physical Examination:  Physical Exam  Vitals signs reviewed. Constitutional:       General: He is not in acute distress. Appearance: He is well-developed. He is not diaphoretic.       Comments: Icelandic-speaking 1 tablet by mouth every 4 (four) hours as needed for Pain. Dispense: 180 tablet; Refill: 0    2. Therapeutic opioid-induced constipation (OIC)    3. Weight loss    4. Other fatigue    5. Metastatic renal cell carcinoma, unspecified laterality ([facility]    6. Palliative Care Team to participate in the care of your patient. I will continue to follow clinically.     Follow up in 4 weeks    MARIBEL Vidal [facility]  [phone]  12/17/2020

## 2021-01-01 ENCOUNTER — TELEPHONE (OUTPATIENT)
Dept: INTERNAL MEDICINE CLINIC | Facility: CLINIC | Age: 71
End: 2021-01-01

## 2021-01-01 ENCOUNTER — TELEPHONE (OUTPATIENT)
Dept: HEMATOLOGY/ONCOLOGY | Facility: HOSPITAL | Age: 71
End: 2021-01-01

## 2021-01-07 NOTE — TELEPHONE ENCOUNTER
Toxicities: Carbozantinib S-Malate    Fatigue/Anorexia/Dehydration     Fatigue: Grade 3 Rocíojos Smith reports over the last 3 days Francisco is having a lot of fatigue. He is now spending almost all day in bed.  Yesterday in the morning he was strong enough t

## 2021-01-07 NOTE — TELEPHONE ENCOUNTER
I spoke with Francisco's daughter Saeed Oneal today. Liam Dowell has been rapidly declining at home for the past 3 days.   He sleeps for most of the day, is hardly eating, has no energy, and has been seeing and speaking to his mother who had

## 2021-01-07 NOTE — TELEPHONE ENCOUNTER
Francisco's daughter, Napoleon Loyd, called. She would like to speak with a nurse regarding her father. She states he has lost a lot of weight since his last appointment and is very tired.  He does not want to take his medication or come in for an appointmen

## 2021-01-09 NOTE — TELEPHONE ENCOUNTER
[facility] called. His oncologist ordered Hospice. Pt is stopping treatment. Pt would like for Dr. Dayana Charlton to follow him, but nurse would like to know how involved he would like to be.  She suggested that Hospice would handle the day to day p

## 2021-01-19 ENCOUNTER — TELEPHONE (OUTPATIENT)
Dept: INTERNAL MEDICINE CLINIC | Facility: CLINIC | Age: 71
End: 2021-01-19

## 2021-01-21 ENCOUNTER — APPOINTMENT (OUTPATIENT)
Dept: HEMATOLOGY/ONCOLOGY | Facility: HOSPITAL | Age: 71
End: 2021-01-21
Attending: INTERNAL MEDICINE
Payer: MEDICARE

## 2021-02-03 DIAGNOSIS — Z23 NEED FOR VACCINATION: ICD-10-CM

## 2021-02-05 ENCOUNTER — PATIENT MESSAGE (OUTPATIENT)
Dept: CASE MANAGEMENT | Age: 71
End: 2021-02-05

## 2021-02-17 NOTE — TELEPHONE ENCOUNTER
Orders signed by Dr. Thong Higginbotham back to [facility] at? [phone]. Faxes failed twice. Called [facility] answering service and advised of failed faxes. Requested verification of number.  Person answering took a message and will have nurse

## 2021-02-22 NOTE — TELEPHONE ENCOUNTER
Order [id number] fax has failed since 2/16/21. Called [facility] for alternated number :[phone].  Fax went thru

## 2021-03-22 ENCOUNTER — TELEPHONE (OUTPATIENT)
Dept: INTERNAL MEDICINE CLINIC | Facility: CLINIC | Age: 71
End: 2021-03-22

## 2021-03-29 ENCOUNTER — TELEPHONE (OUTPATIENT)
Dept: INTERNAL MEDICINE CLINIC | Facility: CLINIC | Age: 71
End: 2021-03-29

## 2021-04-02 ENCOUNTER — TELEPHONE (OUTPATIENT)
Dept: INTERNAL MEDICINE CLINIC | Facility: CLINIC | Age: 71
End: 2021-04-02

## 2021-04-02 NOTE — TELEPHONE ENCOUNTER
Written Certifica/21tion for dated 1/15/21, POC dated 2/9/21 received and placed in Dr. Giovana Burton in basket to complete.

## 2021-04-19 ENCOUNTER — TELEPHONE (OUTPATIENT)
Dept: INTERNAL MEDICINE CLINIC | Facility: CLINIC | Age: 71
End: 2021-04-19

## 2021-04-23 ENCOUNTER — TELEPHONE (OUTPATIENT)
Dept: INTERNAL MEDICINE CLINIC | Facility: CLINIC | Age: 71
End: 2021-04-23

## 2021-04-23 NOTE — TELEPHONE ENCOUNTER
Certification form dated 1-08-21 to 04-07-21 signed by Dr. Dayana Charlton, faxed back and confirmed sent.

## 2023-07-22 NOTE — TELEPHONE ENCOUNTER
Pts daughter called to schedule appt to follow up after lung biopsy. No appts available. Please call. Not applicable

## 2025-06-02 NOTE — PROGRESS NOTES
Palliative Care Consult Note     Patient Name: Som Romero   YOB: 1950   Medical Record Number: [id number]   Date of visit: 2/26/2019     Chief Complaint/Reason for Visit:  Patient presents with:  Palliative Care      [facility], [facility] History:  Past Surgical History:   Procedure Laterality Date   • AMPUTATION FINGER/THUMB Right 1965    traumatic, x 3   • WRIST FRACTURE SURGERY Left 2003       Allergies:  No Known Allergies    Family History:  Family History   Problem Relation Age of Ons between palliative care and hospice. I provided education about the Medicare hospice benefit and philosophy (for future reference). Palliative care brochure provided.       Will have complete goals of care discussion and advanced care planning discussion du reports intermittently coughing up sputum which is clear/white in color and small amounts) and shortness of breath (Patient reports shortness of breath during radiation therapy; he attributes this to anxiety because he is in a small space and unable to lie have insomnia. Physical Examination:  Physical Exam   Constitutional: He is oriented to person, place, and time. He appears well-developed. No distress.    Japanese speaking male, appears stated age, thin, chronically ill-appearing   HENT:   Head: Nor (prescription provided today for #180 pills)  -Discussed with pt/family that goal is for pain to be better controlled with higher dose of Hydrocodone and he may require less medication throughout the day  -Advised pt/family that pt is to NOT EXCEED 4,000mg Mr. Moya

## (undated) NOTE — MR AVS SNAPSHOT
Fredy Martin   2017 3:00 PM   Office Visit   MRN:  [id number]    Description:  Male : 1950   Provider:  Milad Arrieta   Department:  [facility] Hematology Oncology              Visit Summary      Primary Visit Diagnosis     T medical emergencies, dial 911. Visit [email or web address] to learn more.

## (undated) NOTE — LETTER
[address] [facility]  Autorizacion para Procedimientos Invasivos    1.  Por el Candida Alexandre, autorizo al doctor Dr Benitez Sera a mi(s) médico(s) y a Jorge Malone designado shahbaz asistente del doctor, a realizar la siguiente operación y/o procedi 5. Doy mi consentimiento para que se tomen fotografías del futuro procedimiento(s) con los efectos de ayudar a progresar la medicina, la ciencia y/o la educación, siempre y cuando mi identidad permanezca confidencial. Si se ha grabado en video el procedimi _____________________________________________   Izabella Moritz responsable del paciente aldair de edad o inconsciente) (Relación con el paciente)   ___________________________________________________________________________________________________________________

## (undated) NOTE — LETTER
[address] [place] [address]  Autorizacion para Procedimientos Invasivos    1.  Por el presente, autorizo al doctor Rod Alfonso , a mi(s) médico(s) y a Bobbye Anne designado shahbaz asistente del doctor, a realizar la siguiente operación y/o procedim 5. Doy mi consentimiento para que se tomen fotografías del futuro procedimiento(s) con los efectos de ayudar a progresar la medicina, la ciencia y/o la educación, siempre y cuando mi identidad permanezca confidencial. Si se ha grabado en video el procedimi _____________________________________________   Col[address] Lane responsable del paciente aldair de edad o inconsciente) (Relación con el paciente)   ___________________________________________________________________________________________________________________

## (undated) NOTE — LETTER
6/21/2019              Francisco Jiménez        [address] [facility] [address]         To whom it may concern,    Cornelius Whaley is currently a patient under my medical care.   The patient's medical condition makes serving on jury duty inadv

## (undated) NOTE — MR AVS SNAPSHOT
[address]  [address] [facility]  [phone]               Thank you for choosing us for your health care visit with Sapphire Veliz MD.  We are glad to serve you and happy to provide you with this summary No Known Allergies                Today's Vital Signs     Temp Weight                98.3 °F (36.8 °C) (Oral) 163 lb 6.4 oz (74.118 kg)             Current Medications          This list is accurate as of: 3/31/17 10:41 AM.  Always use your most recent me Don’t eat while distracted and slow down. Avoid over sized portions. Don’t eat while when you’re bored.      EAT THESE FOODS MORE OFTEN: EAT THESE FOODS LESS OFTEN:   Make half your plate fruits and vegetables Highly refined, white starches including wh